# Patient Record
Sex: FEMALE | Race: OTHER | HISPANIC OR LATINO | URBAN - METROPOLITAN AREA
[De-identification: names, ages, dates, MRNs, and addresses within clinical notes are randomized per-mention and may not be internally consistent; named-entity substitution may affect disease eponyms.]

---

## 2017-01-17 ENCOUNTER — INPATIENT (INPATIENT)
Facility: HOSPITAL | Age: 27
LOS: 0 days | Discharge: HOME | DRG: 777 | End: 2017-01-18
Attending: OBSTETRICS & GYNECOLOGY | Admitting: OBSTETRICS & GYNECOLOGY
Payer: MEDICAID

## 2017-01-17 VITALS
HEART RATE: 85 BPM | RESPIRATION RATE: 18 BRPM | DIASTOLIC BLOOD PRESSURE: 99 MMHG | SYSTOLIC BLOOD PRESSURE: 141 MMHG | OXYGEN SATURATION: 100 % | TEMPERATURE: 99 F

## 2017-01-17 DIAGNOSIS — R10.9 UNSPECIFIED ABDOMINAL PAIN: ICD-10-CM

## 2017-01-17 DIAGNOSIS — O00.90 UNSPECIFIED ECTOPIC PREGNANCY WITHOUT INTRAUTERINE PREGNANCY: ICD-10-CM

## 2017-01-17 LAB
ALBUMIN SERPL ELPH-MCNC: 4.1 G/DL — SIGNIFICANT CHANGE UP (ref 3.3–5)
ALP SERPL-CCNC: 45 U/L — SIGNIFICANT CHANGE UP (ref 40–120)
ALT FLD-CCNC: 11 U/L RC — SIGNIFICANT CHANGE UP (ref 10–45)
ANION GAP SERPL CALC-SCNC: 10 MMOL/L — SIGNIFICANT CHANGE UP (ref 5–17)
APPEARANCE UR: CLEAR — SIGNIFICANT CHANGE UP
AST SERPL-CCNC: 18 U/L — SIGNIFICANT CHANGE UP (ref 10–40)
BASOPHILS # BLD AUTO: 0 K/UL — SIGNIFICANT CHANGE UP (ref 0–0.2)
BASOPHILS NFR BLD AUTO: 0.4 % — SIGNIFICANT CHANGE UP (ref 0–2)
BILIRUB SERPL-MCNC: 0.3 MG/DL — SIGNIFICANT CHANGE UP (ref 0.2–1.2)
BILIRUB UR-MCNC: NEGATIVE — SIGNIFICANT CHANGE UP
BLD GP AB SCN SERPL QL: NEGATIVE — SIGNIFICANT CHANGE UP
BUN SERPL-MCNC: 10 MG/DL — SIGNIFICANT CHANGE UP (ref 7–23)
CALCIUM SERPL-MCNC: 8.3 MG/DL — LOW (ref 8.4–10.5)
CHLORIDE SERPL-SCNC: 107 MMOL/L — SIGNIFICANT CHANGE UP (ref 96–108)
CO2 SERPL-SCNC: 23 MMOL/L — SIGNIFICANT CHANGE UP (ref 22–31)
COLOR SPEC: YELLOW — SIGNIFICANT CHANGE UP
CREAT SERPL-MCNC: 0.67 MG/DL — SIGNIFICANT CHANGE UP (ref 0.5–1.3)
DIFF PNL FLD: NEGATIVE — SIGNIFICANT CHANGE UP
EOSINOPHIL # BLD AUTO: 0 K/UL — SIGNIFICANT CHANGE UP (ref 0–0.5)
EOSINOPHIL NFR BLD AUTO: 0.8 % — SIGNIFICANT CHANGE UP (ref 0–6)
EPI CELLS # UR: SIGNIFICANT CHANGE UP /HPF
GLUCOSE SERPL-MCNC: 121 MG/DL — HIGH (ref 70–99)
GLUCOSE UR QL: NEGATIVE — SIGNIFICANT CHANGE UP
HCG SERPL-ACNC: 565.5 MIU/ML — SIGNIFICANT CHANGE UP
HCT VFR BLD CALC: 33.9 % — LOW (ref 34.5–45)
HGB BLD-MCNC: 11.8 G/DL — SIGNIFICANT CHANGE UP (ref 11.5–15.5)
KETONES UR-MCNC: NEGATIVE — SIGNIFICANT CHANGE UP
LEUKOCYTE ESTERASE UR-ACNC: NEGATIVE — SIGNIFICANT CHANGE UP
LYMPHOCYTES # BLD AUTO: 2 K/UL — SIGNIFICANT CHANGE UP (ref 1–3.3)
LYMPHOCYTES # BLD AUTO: 34.9 % — SIGNIFICANT CHANGE UP (ref 13–44)
MCHC RBC-ENTMCNC: 32.9 PG — SIGNIFICANT CHANGE UP (ref 27–34)
MCHC RBC-ENTMCNC: 34.7 GM/DL — SIGNIFICANT CHANGE UP (ref 32–36)
MCV RBC AUTO: 94.6 FL — SIGNIFICANT CHANGE UP (ref 80–100)
MONOCYTES # BLD AUTO: 0.3 K/UL — SIGNIFICANT CHANGE UP (ref 0–0.9)
MONOCYTES NFR BLD AUTO: 4.8 % — SIGNIFICANT CHANGE UP (ref 2–14)
NEUTROPHILS # BLD AUTO: 3.5 K/UL — SIGNIFICANT CHANGE UP (ref 1.8–7.4)
NEUTROPHILS NFR BLD AUTO: 59.1 % — SIGNIFICANT CHANGE UP (ref 43–77)
NITRITE UR-MCNC: NEGATIVE — SIGNIFICANT CHANGE UP
PH UR: 7 — SIGNIFICANT CHANGE UP (ref 4.8–8)
PLATELET # BLD AUTO: 175 K/UL — SIGNIFICANT CHANGE UP (ref 150–400)
POTASSIUM SERPL-MCNC: 3.5 MMOL/L — SIGNIFICANT CHANGE UP (ref 3.5–5.3)
POTASSIUM SERPL-SCNC: 3.5 MMOL/L — SIGNIFICANT CHANGE UP (ref 3.5–5.3)
PROT SERPL-MCNC: 7.1 G/DL — SIGNIFICANT CHANGE UP (ref 6–8.3)
PROT UR-MCNC: SIGNIFICANT CHANGE UP
RBC # BLD: 3.59 M/UL — LOW (ref 3.8–5.2)
RBC # FLD: 12.7 % — SIGNIFICANT CHANGE UP (ref 10.3–14.5)
RBC CASTS # UR COMP ASSIST: SIGNIFICANT CHANGE UP /HPF (ref 0–2)
RH IG SCN BLD-IMP: POSITIVE — SIGNIFICANT CHANGE UP
SODIUM SERPL-SCNC: 140 MMOL/L — SIGNIFICANT CHANGE UP (ref 135–145)
SP GR SPEC: 1.02 — SIGNIFICANT CHANGE UP (ref 1.01–1.02)
UROBILINOGEN FLD QL: NEGATIVE — SIGNIFICANT CHANGE UP
WBC # BLD: 5.9 K/UL — SIGNIFICANT CHANGE UP (ref 3.8–10.5)
WBC # FLD AUTO: 5.9 K/UL — SIGNIFICANT CHANGE UP (ref 3.8–10.5)
WBC UR QL: SIGNIFICANT CHANGE UP /HPF (ref 0–5)

## 2017-01-17 PROCEDURE — 93975 VASCULAR STUDY: CPT | Mod: 26

## 2017-01-17 PROCEDURE — 59151 TREAT ECTOPIC PREGNANCY: CPT

## 2017-01-17 PROCEDURE — 99285 EMERGENCY DEPT VISIT HI MDM: CPT

## 2017-01-17 PROCEDURE — 76817 TRANSVAGINAL US OBSTETRIC: CPT | Mod: 26

## 2017-01-17 RX ORDER — SODIUM CHLORIDE 9 MG/ML
1000 INJECTION, SOLUTION INTRAVENOUS
Qty: 0 | Refills: 0 | Status: DISCONTINUED | OUTPATIENT
Start: 2017-01-17 | End: 2017-01-18

## 2017-01-17 NOTE — ED PROVIDER NOTE - PROGRESS NOTE DETAILS
Dr. Bedoya: Pt with L adnexal ectopic. Continues to be HD stable. OBGYN consulted. Dr. Bedoya: Pt to go to OR with OBGYN in setting of worsening pain.

## 2017-01-17 NOTE — ED PROVIDER NOTE - ATTENDING CONTRIBUTION TO CARE
28yo F  EGA 8 weeks by dates. LMP was 16. Pt states she has been having pain suprapubic and RLQ x1 week radiating to low back. Denies vaginal bleeding, fever, chills, dysuria. Has not seen anyone for this pregnancy as she just took home test yesterday. Well appearing young female NAD, HD stable. Lungs clear, RRR, no murmur. ABd soft suprapubic and RLQ TTP, +guarding, no rebound. ext wwp, no edema. Plan: Labs, UA, UCG, Beta, Tyoe and screen, sono, re-eval.

## 2017-01-17 NOTE — ED PROVIDER NOTE - MEDICAL DECISION MAKING DETAILS
27 year old female with RLQ/suprapubic pain and right adnexal tenderness on exam, 7-8 weeks pregnant by dates, will r/o UTI, r/o ectopic and check type, reassess 27 year old female with RLQ/suprapubic pain and right adnexal tenderness on exam, 7-8 weeks pregnant by dates, will r/o UTI, r/o ectopic and check type, reassess  Turrin: See attending statement below

## 2017-01-17 NOTE — ED PROVIDER NOTE - OBJECTIVE STATEMENT
27 year old female with no PMHx  (first preg c/b bleeding during first month) p/w LLQ pain x 1 week, no dysuria, no fever,   LMP 16 27 year old female with no PMHx  (first preg c/b bleeding during first month) p/w suprapubic/RLQ pain radiating to lower back x 1 week, no dysuria, no fever, no discharge, no bleeding. She took 2 home pregnancy tests yesterday and they were both positive (Upreg here also positive). No fertility drugs. She has been taking Ampicillin for her pain  because she did not know that she was pregnant. No STD risk factors.  LMP 16

## 2017-01-17 NOTE — H&P ADULT. - HISTORY OF PRESENT ILLNESS
39 year old  LMP  presenting with three days of worsening pelvic pain. Pain started as 2/10 is now 8/10. No nausea, emesis, vaginal bleeding, CP/SOB, fevers. Took home pregnancy test, positive. Pain is bilateral lower quadrant. Worse with ambulation. Worse with movement. No dizziness. No history of STD/PID.

## 2017-01-17 NOTE — H&P ADULT. - PROBLEM SELECTOR PLAN 1
-Admit to GYN Service  -Patient counseled and consented for Dx Laparoscopy, removal of ectopic pregnancy, salpingectomy, possible ovarian cystectomy, possible laparotomy  -Patient counseled on risks/benefits/alternatives, explained that pregnancy is non viable and life threatening, patient demonstrates understanding  - Labs drawn  -NPO  -[id number]  -Routine postoperative care

## 2017-01-17 NOTE — ED ADULT NURSE NOTE - OBJECTIVE STATEMENT
27 y.o. Female presents to the ED c/o suprapubic and RLQ pain that radiates to the lower back x1 week. Pt took 2 pregnancy tests at home with positive results x2. Pt reports taking ampicillin for pain without knowing she was pregnant. LMP was 2016.  - reports bleeding for the first month of the first pregnancy. Denies any bleeding, fever/chills, CP, SOB, N/V/D, urinary/bowel complications. Pt is in no current distress. Comfort and safety provided. 27 y.o. Female presents to the ED c/o suprapubic and RLQ pain "on and off" that radiates to the lower back x1 week. Ghanaian speaking - translated by Dr. Wilson. Pt took 2 pregnancy tests at home with positive results x2. Pt reports taking ampicillin for pain without knowing she was pregnant. LMP was 2016.  - reports bleeding for the first month of the first pregnancy. Denies any bleeding, fever/chills, CP, SOB, N/V/D, urinary/bowel complications. Pt is in no current distress. Comfort and safety provided. Significant other at bedside. 27 y.o. Female presents to the ED c/o suprapubic and RLQ pain "on and off" that radiates to the lower back x1 week. Israeli speaking - translated by Dr. Wilson. Pt took 2 pregnancy tests at home with positive results x2. Pt reports taking ampicillin for pain without knowing she was pregnant. LMP was 2016.  - reports bleeding for the first month of the first pregnancy. Tenderness in suprapubic area. Denies any bleeding, fever/chills, CP, SOB, N/V/D, urinary/bowel complications. Pt is in no current distress. Comfort and safety provided. Significant other at bedside.

## 2017-01-18 VITALS
SYSTOLIC BLOOD PRESSURE: 96 MMHG | RESPIRATION RATE: 19 BRPM | DIASTOLIC BLOOD PRESSURE: 60 MMHG | HEART RATE: 76 BPM | OXYGEN SATURATION: 100 % | TEMPERATURE: 98 F

## 2017-01-18 LAB
BASOPHILS # BLD AUTO: 0 K/UL — SIGNIFICANT CHANGE UP (ref 0–0.2)
BASOPHILS NFR BLD AUTO: 0.1 % — SIGNIFICANT CHANGE UP (ref 0–2)
CULTURE RESULTS: SIGNIFICANT CHANGE UP
EOSINOPHIL # BLD AUTO: 0 K/UL — SIGNIFICANT CHANGE UP (ref 0–0.5)
EOSINOPHIL NFR BLD AUTO: 0 % — SIGNIFICANT CHANGE UP (ref 0–6)
HCT VFR BLD CALC: 33.5 % — LOW (ref 34.5–45)
HGB BLD-MCNC: 11.6 G/DL — SIGNIFICANT CHANGE UP (ref 11.5–15.5)
LYMPHOCYTES # BLD AUTO: 1.1 K/UL — SIGNIFICANT CHANGE UP (ref 1–3.3)
LYMPHOCYTES # BLD AUTO: 9.7 % — LOW (ref 13–44)
MCHC RBC-ENTMCNC: 33.2 PG — SIGNIFICANT CHANGE UP (ref 27–34)
MCHC RBC-ENTMCNC: 34.8 GM/DL — SIGNIFICANT CHANGE UP (ref 32–36)
MCV RBC AUTO: 95.3 FL — SIGNIFICANT CHANGE UP (ref 80–100)
MONOCYTES # BLD AUTO: 0.3 K/UL — SIGNIFICANT CHANGE UP (ref 0–0.9)
MONOCYTES NFR BLD AUTO: 2.5 % — SIGNIFICANT CHANGE UP (ref 2–14)
NEUTROPHILS # BLD AUTO: 10.2 K/UL — HIGH (ref 1.8–7.4)
NEUTROPHILS NFR BLD AUTO: 87.7 % — HIGH (ref 43–77)
PLATELET # BLD AUTO: 169 K/UL — SIGNIFICANT CHANGE UP (ref 150–400)
RBC # BLD: 3.51 M/UL — LOW (ref 3.8–5.2)
RBC # FLD: 12.8 % — SIGNIFICANT CHANGE UP (ref 10.3–14.5)
SPECIMEN SOURCE: SIGNIFICANT CHANGE UP
WBC # BLD: 11.6 K/UL — HIGH (ref 3.8–10.5)
WBC # FLD AUTO: 11.6 K/UL — HIGH (ref 3.8–10.5)

## 2017-01-18 PROCEDURE — 88304 TISSUE EXAM BY PATHOLOGIST: CPT | Mod: 26

## 2017-01-18 PROCEDURE — 88305 TISSUE EXAM BY PATHOLOGIST: CPT | Mod: 26

## 2017-01-18 RX ORDER — ACETAMINOPHEN 500 MG
975 TABLET ORAL EVERY 6 HOURS
Qty: 0 | Refills: 0 | Status: DISCONTINUED | OUTPATIENT
Start: 2017-01-18 | End: 2017-01-18

## 2017-01-18 RX ORDER — OXYCODONE HYDROCHLORIDE 5 MG/1
5 TABLET ORAL EVERY 4 HOURS
Qty: 0 | Refills: 0 | Status: DISCONTINUED | OUTPATIENT
Start: 2017-01-18 | End: 2017-01-18

## 2017-01-18 RX ORDER — HYDROMORPHONE HYDROCHLORIDE 2 MG/ML
0.5 INJECTION INTRAMUSCULAR; INTRAVENOUS; SUBCUTANEOUS
Qty: 0 | Refills: 0 | Status: DISCONTINUED | OUTPATIENT
Start: 2017-01-18 | End: 2017-01-18

## 2017-01-18 RX ORDER — OXYCODONE HYDROCHLORIDE 5 MG/1
1 TABLET ORAL
Qty: 20 | Refills: 0 | OUTPATIENT
Start: 2017-01-18 | End: 2017-01-23

## 2017-01-18 RX ORDER — ONDANSETRON 8 MG/1
4 TABLET, FILM COATED ORAL ONCE
Qty: 0 | Refills: 0 | Status: DISCONTINUED | OUTPATIENT
Start: 2017-01-18 | End: 2017-01-18

## 2017-01-18 RX ORDER — SODIUM CHLORIDE 9 MG/ML
1000 INJECTION, SOLUTION INTRAVENOUS
Qty: 0 | Refills: 0 | Status: COMPLETED | OUTPATIENT
Start: 2017-01-18 | End: 2017-01-18

## 2017-01-18 RX ORDER — SODIUM CHLORIDE 9 MG/ML
1000 INJECTION, SOLUTION INTRAVENOUS ONCE
Qty: 0 | Refills: 0 | Status: COMPLETED | OUTPATIENT
Start: 2017-01-18 | End: 2017-01-18

## 2017-01-18 RX ORDER — SIMETHICONE 80 MG/1
80 TABLET, CHEWABLE ORAL
Qty: 0 | Refills: 0 | Status: DISCONTINUED | OUTPATIENT
Start: 2017-01-18 | End: 2017-01-18

## 2017-01-18 RX ORDER — SODIUM CHLORIDE 9 MG/ML
1000 INJECTION, SOLUTION INTRAVENOUS
Qty: 0 | Refills: 0 | Status: DISCONTINUED | OUTPATIENT
Start: 2017-01-18 | End: 2017-01-18

## 2017-01-18 RX ORDER — IBUPROFEN 200 MG
600 TABLET ORAL EVERY 6 HOURS
Qty: 0 | Refills: 0 | Status: DISCONTINUED | OUTPATIENT
Start: 2017-01-18 | End: 2017-01-18

## 2017-01-18 RX ORDER — IBUPROFEN 200 MG
1 TABLET ORAL
Qty: 0 | Refills: 0 | COMMUNITY
Start: 2017-01-18

## 2017-01-18 RX ADMIN — Medication 975 MILLIGRAM(S): at 05:27

## 2017-01-18 RX ADMIN — OXYCODONE HYDROCHLORIDE 5 MILLIGRAM(S): 5 TABLET ORAL at 21:26

## 2017-01-18 RX ADMIN — Medication 975 MILLIGRAM(S): at 12:45

## 2017-01-18 RX ADMIN — OXYCODONE HYDROCHLORIDE 5 MILLIGRAM(S): 5 TABLET ORAL at 16:40

## 2017-01-18 RX ADMIN — SIMETHICONE 80 MILLIGRAM(S): 80 TABLET, CHEWABLE ORAL at 11:55

## 2017-01-18 RX ADMIN — SODIUM CHLORIDE 1000 MILLILITER(S): 9 INJECTION, SOLUTION INTRAVENOUS at 10:12

## 2017-01-18 RX ADMIN — Medication 975 MILLIGRAM(S): at 19:00

## 2017-01-18 RX ADMIN — SODIUM CHLORIDE 999 MILLILITER(S): 9 INJECTION, SOLUTION INTRAVENOUS at 06:54

## 2017-01-18 RX ADMIN — Medication 975 MILLIGRAM(S): at 18:27

## 2017-01-18 RX ADMIN — OXYCODONE HYDROCHLORIDE 5 MILLIGRAM(S): 5 TABLET ORAL at 09:00

## 2017-01-18 RX ADMIN — Medication 600 MILLIGRAM(S): at 11:54

## 2017-01-18 RX ADMIN — OXYCODONE HYDROCHLORIDE 5 MILLIGRAM(S): 5 TABLET ORAL at 21:56

## 2017-01-18 RX ADMIN — Medication 600 MILLIGRAM(S): at 12:45

## 2017-01-18 RX ADMIN — Medication 600 MILLIGRAM(S): at 17:07

## 2017-01-18 RX ADMIN — HYDROMORPHONE HYDROCHLORIDE 0.5 MILLIGRAM(S): 2 INJECTION INTRAMUSCULAR; INTRAVENOUS; SUBCUTANEOUS at 03:40

## 2017-01-18 RX ADMIN — Medication 600 MILLIGRAM(S): at 05:27

## 2017-01-18 RX ADMIN — OXYCODONE HYDROCHLORIDE 5 MILLIGRAM(S): 5 TABLET ORAL at 15:26

## 2017-01-18 RX ADMIN — OXYCODONE HYDROCHLORIDE 5 MILLIGRAM(S): 5 TABLET ORAL at 08:08

## 2017-01-18 RX ADMIN — SIMETHICONE 80 MILLIGRAM(S): 80 TABLET, CHEWABLE ORAL at 18:27

## 2017-01-18 RX ADMIN — Medication 600 MILLIGRAM(S): at 18:26

## 2017-01-18 RX ADMIN — Medication 975 MILLIGRAM(S): at 11:55

## 2017-01-18 NOTE — BRIEF OPERATIVE NOTE - OPERATION/FINDINGS
100 cc hemoperitoneum. Left tubal ectopic pregnancy. Left paratubal cyst ~ 5.0 cm, Right paratubal cysts

## 2017-01-18 NOTE — DISCHARGE NOTE ADULT - HOSPITAL COURSE
Patient is a 26 year old  LMP  presenting with progressively worsening abdominal pain for three days. Patient had TVUS, found to have left ectopic pregnancy. Decision was made to go to the OR. Patient underwent LSC left salpingectomy, removal of ectopic pregnancy, bilateral paratubal cystectomy. Patient tolerated the procedure well. Vitals stable, patient discharged on postoperative day zero with outpatient follow up in [facility].

## 2017-01-18 NOTE — PROVIDER CONTACT NOTE (OTHER) - ACTION/TREATMENT ORDERED:
Dr. Dunlap ordered 500mL bolus of LR to be given now and 125 mL/hour of LR post bolus.  Will reassess VS post bolus. Dr. Dunlap ordered 1000 mL bolus of LR to be given now and 125 mL/hour of LR post bolus. Bolus of LR initiated.  Will reassess VS post bolus.

## 2017-01-18 NOTE — DISCHARGE NOTE ADULT - CARE PLAN
Principal Discharge DX:	Ectopic pregnancy  Goal:	Return to normal activity  Instructions for follow-up, activity and diet:	Regular diet as tolerated. Ambulate as tolerated.

## 2017-01-18 NOTE — DISCHARGE NOTE ADULT - PROVIDER TOKENS
FREE:[LAST:[[facility]],PHONE:[phone]],FAX:[(   [phone]    -],ADDRESS:[[address] [place] [address]]]

## 2017-01-18 NOTE — DISCHARGE NOTE ADULT - PATIENT PORTAL LINK FT
“You can access the [facility] Patient Portal, offered by [facility], by registering with the following website: [email or web address]”

## 2017-01-18 NOTE — DISCHARGE NOTE ADULT - MEDICATION SUMMARY - MEDICATIONS TO TAKE
I will START or STAY ON the medications listed below when I get home from the hospital:    oxyCODONE 5 mg oral tablet  -- 1 tab(s) by mouth 4 times a day, As Needed MDD:4  -- Caution federal law prohibits the transfer of this drug to any person other  than the person for whom it was prescribed.  It is very important that you take or use this exactly as directed.  Do not skip doses or discontinue unless directed by your doctor.  May cause drowsiness.  Alcohol may intensify this effect.  Use care when operating dangerous machinery.  This prescription cannot be refilled.  Using more of this medication than prescribed may cause serious breathing problems.    -- Indication: For Pain    ibuprofen 600 mg oral tablet  -- 1 tab(s) by mouth every 6 hours  -- Indication: For Pain

## 2017-01-18 NOTE — PROVIDER CONTACT NOTE (OTHER) - RECOMMENDATIONS
Spoke with Dr. Dunlap about starting IVF. Primary RN aware and spoke with Dr. Dunlap about starting IVF.

## 2017-01-18 NOTE — BRIEF OPERATIVE NOTE - PROCEDURE
Laparoscopic cystectomy  01/18/2017    Active  [id number]  Laparoscopic salpingectomy  01/18/2017    Active  [id number]

## 2017-01-18 NOTE — ED ADULT NURSE REASSESSMENT NOTE - NS ED NURSE REASSESS COMMENT FT1
2300 Report received from Josefina AN. Pt AAOx4, NAD, resting comfortably in bed with spouse at bedside. Pt c/o 8/10 RLQ pain. Pt denies headache, dizziness, chest pain, cough, SOB, n/v/d, vaginal bleeding/discharge, urinary symptoms, fevers, chills, weakness at this time. Safety maintained.    7585: Pt AAOx4, NAD, no changes observed at this time. OR NATALIYA Sethi called ED RN for report. Report given to OR room 7 NATALIYA Sethi. Safety maintained, pt awaiting transport. Pt undressed and jewelry removed, spouse has pt's belongings.

## 2017-01-18 NOTE — DISCHARGE NOTE ADULT - INSTRUCTIONS
regular diet Keep follow up appointment with doctor as instructed and call doctor if you have any signs of infection, fever, chills, difficulty urinating, scope sites draining, swollen, reddened warm to touch, heavy vaginal bleeding or clots, excessive pain, unable to tolerate food, and any questions or concerns.

## 2017-01-20 ENCOUNTER — EMERGENCY (EMERGENCY)
Facility: HOSPITAL | Age: 27
LOS: 1 days | Discharge: HOME | End: 2017-01-20
Attending: EMERGENCY MEDICINE | Admitting: EMERGENCY MEDICINE
Payer: MEDICAID

## 2017-01-20 VITALS
SYSTOLIC BLOOD PRESSURE: 107 MMHG | DIASTOLIC BLOOD PRESSURE: 73 MMHG | OXYGEN SATURATION: 100 % | TEMPERATURE: 98 F | HEART RATE: 67 BPM | RESPIRATION RATE: 18 BRPM

## 2017-01-20 VITALS
HEIGHT: 63.78 IN | HEART RATE: 96 BPM | SYSTOLIC BLOOD PRESSURE: 102 MMHG | OXYGEN SATURATION: 99 % | TEMPERATURE: 98 F | DIASTOLIC BLOOD PRESSURE: 60 MMHG | RESPIRATION RATE: 18 BRPM

## 2017-01-20 DIAGNOSIS — R10.31 RIGHT LOWER QUADRANT PAIN: ICD-10-CM

## 2017-01-20 DIAGNOSIS — R10.32 LEFT LOWER QUADRANT PAIN: ICD-10-CM

## 2017-01-20 DIAGNOSIS — O00.90 UNSPECIFIED ECTOPIC PREGNANCY WITHOUT INTRAUTERINE PREGNANCY: Chronic | ICD-10-CM

## 2017-01-20 DIAGNOSIS — R10.30 LOWER ABDOMINAL PAIN, UNSPECIFIED: ICD-10-CM

## 2017-01-20 LAB
ALBUMIN SERPL ELPH-MCNC: 3.9 G/DL — SIGNIFICANT CHANGE UP (ref 3.3–5)
ALP SERPL-CCNC: 44 U/L — SIGNIFICANT CHANGE UP (ref 40–120)
ALT FLD-CCNC: 16 U/L RC — SIGNIFICANT CHANGE UP (ref 10–45)
ANION GAP SERPL CALC-SCNC: 11 MMOL/L — SIGNIFICANT CHANGE UP (ref 5–17)
APPEARANCE UR: ABNORMAL
APTT BLD: 31.5 SEC — SIGNIFICANT CHANGE UP (ref 27.5–37.4)
AST SERPL-CCNC: 26 U/L — SIGNIFICANT CHANGE UP (ref 10–40)
BACTERIA # UR AUTO: ABNORMAL /HPF
BASOPHILS # BLD AUTO: 0 K/UL — SIGNIFICANT CHANGE UP (ref 0–0.2)
BASOPHILS NFR BLD AUTO: 0.5 % — SIGNIFICANT CHANGE UP (ref 0–2)
BILIRUB SERPL-MCNC: 0.2 MG/DL — SIGNIFICANT CHANGE UP (ref 0.2–1.2)
BILIRUB UR-MCNC: NEGATIVE — SIGNIFICANT CHANGE UP
BUN SERPL-MCNC: 9 MG/DL — SIGNIFICANT CHANGE UP (ref 7–23)
CALCIUM SERPL-MCNC: 8 MG/DL — LOW (ref 8.4–10.5)
CHLORIDE SERPL-SCNC: 105 MMOL/L — SIGNIFICANT CHANGE UP (ref 96–108)
CO2 SERPL-SCNC: 24 MMOL/L — SIGNIFICANT CHANGE UP (ref 22–31)
COLOR SPEC: SIGNIFICANT CHANGE UP
CREAT SERPL-MCNC: 0.51 MG/DL — SIGNIFICANT CHANGE UP (ref 0.5–1.3)
DIFF PNL FLD: NEGATIVE — SIGNIFICANT CHANGE UP
EOSINOPHIL # BLD AUTO: 0.1 K/UL — SIGNIFICANT CHANGE UP (ref 0–0.5)
EOSINOPHIL NFR BLD AUTO: 1.1 % — SIGNIFICANT CHANGE UP (ref 0–6)
EPI CELLS # UR: ABNORMAL /HPF
GAS PNL BLDV: SIGNIFICANT CHANGE UP
GLUCOSE SERPL-MCNC: 86 MG/DL — SIGNIFICANT CHANGE UP (ref 70–99)
GLUCOSE UR QL: NEGATIVE — SIGNIFICANT CHANGE UP
HCG SERPL-ACNC: 96.9 MIU/ML — SIGNIFICANT CHANGE UP
HCT VFR BLD CALC: 34.5 % — SIGNIFICANT CHANGE UP (ref 34.5–45)
HGB BLD-MCNC: 11.6 G/DL — SIGNIFICANT CHANGE UP (ref 11.5–15.5)
INR BLD: 0.99 RATIO — SIGNIFICANT CHANGE UP (ref 0.88–1.16)
KETONES UR-MCNC: NEGATIVE — SIGNIFICANT CHANGE UP
LEUKOCYTE ESTERASE UR-ACNC: NEGATIVE — SIGNIFICANT CHANGE UP
LYMPHOCYTES # BLD AUTO: 2.1 K/UL — SIGNIFICANT CHANGE UP (ref 1–3.3)
LYMPHOCYTES # BLD AUTO: 38 % — SIGNIFICANT CHANGE UP (ref 13–44)
MCHC RBC-ENTMCNC: 32 PG — SIGNIFICANT CHANGE UP (ref 27–34)
MCHC RBC-ENTMCNC: 33.7 GM/DL — SIGNIFICANT CHANGE UP (ref 32–36)
MCV RBC AUTO: 95.1 FL — SIGNIFICANT CHANGE UP (ref 80–100)
MONOCYTES # BLD AUTO: 0.3 K/UL — SIGNIFICANT CHANGE UP (ref 0–0.9)
MONOCYTES NFR BLD AUTO: 5 % — SIGNIFICANT CHANGE UP (ref 2–14)
NEUTROPHILS # BLD AUTO: 3.1 K/UL — SIGNIFICANT CHANGE UP (ref 1.8–7.4)
NEUTROPHILS NFR BLD AUTO: 55.5 % — SIGNIFICANT CHANGE UP (ref 43–77)
NITRITE UR-MCNC: NEGATIVE — SIGNIFICANT CHANGE UP
PH UR: 6.5 — SIGNIFICANT CHANGE UP (ref 4.8–8)
PLATELET # BLD AUTO: 174 K/UL — SIGNIFICANT CHANGE UP (ref 150–400)
POTASSIUM SERPL-MCNC: 4 MMOL/L — SIGNIFICANT CHANGE UP (ref 3.5–5.3)
POTASSIUM SERPL-SCNC: 4 MMOL/L — SIGNIFICANT CHANGE UP (ref 3.5–5.3)
PROT SERPL-MCNC: 6.4 G/DL — SIGNIFICANT CHANGE UP (ref 6–8.3)
PROT UR-MCNC: NEGATIVE — SIGNIFICANT CHANGE UP
PROTHROM AB SERPL-ACNC: 10.7 SEC — SIGNIFICANT CHANGE UP (ref 10–13.1)
RBC # BLD: 3.63 M/UL — LOW (ref 3.8–5.2)
RBC # FLD: 12.3 % — SIGNIFICANT CHANGE UP (ref 10.3–14.5)
RBC CASTS # UR COMP ASSIST: SIGNIFICANT CHANGE UP /HPF (ref 0–2)
SODIUM SERPL-SCNC: 140 MMOL/L — SIGNIFICANT CHANGE UP (ref 135–145)
SP GR SPEC: 1.01 — LOW (ref 1.01–1.02)
UROBILINOGEN FLD QL: NEGATIVE — SIGNIFICANT CHANGE UP
WBC # BLD: 5.5 K/UL — SIGNIFICANT CHANGE UP (ref 3.8–10.5)
WBC # FLD AUTO: 5.5 K/UL — SIGNIFICANT CHANGE UP (ref 3.8–10.5)
WBC UR QL: SIGNIFICANT CHANGE UP /HPF (ref 0–5)

## 2017-01-20 PROCEDURE — 99285 EMERGENCY DEPT VISIT HI MDM: CPT | Mod: 25

## 2017-01-20 PROCEDURE — 74177 CT ABD & PELVIS W/CONTRAST: CPT

## 2017-01-20 PROCEDURE — 82803 BLOOD GASES ANY COMBINATION: CPT

## 2017-01-20 PROCEDURE — 82330 ASSAY OF CALCIUM: CPT

## 2017-01-20 PROCEDURE — 82947 ASSAY GLUCOSE BLOOD QUANT: CPT

## 2017-01-20 PROCEDURE — 83605 ASSAY OF LACTIC ACID: CPT

## 2017-01-20 PROCEDURE — 96374 THER/PROPH/DIAG INJ IV PUSH: CPT | Mod: XU

## 2017-01-20 PROCEDURE — 96375 TX/PRO/DX INJ NEW DRUG ADDON: CPT | Mod: XU

## 2017-01-20 PROCEDURE — 85730 THROMBOPLASTIN TIME PARTIAL: CPT

## 2017-01-20 PROCEDURE — 82435 ASSAY OF BLOOD CHLORIDE: CPT

## 2017-01-20 PROCEDURE — 99053 MED SERV 10PM-8AM 24 HR FAC: CPT

## 2017-01-20 PROCEDURE — 85014 HEMATOCRIT: CPT

## 2017-01-20 PROCEDURE — 99284 EMERGENCY DEPT VISIT MOD MDM: CPT | Mod: 25

## 2017-01-20 PROCEDURE — 84702 CHORIONIC GONADOTROPIN TEST: CPT

## 2017-01-20 PROCEDURE — 84132 ASSAY OF SERUM POTASSIUM: CPT

## 2017-01-20 PROCEDURE — 81001 URINALYSIS AUTO W/SCOPE: CPT

## 2017-01-20 PROCEDURE — 74177 CT ABD & PELVIS W/CONTRAST: CPT | Mod: 26

## 2017-01-20 PROCEDURE — 80053 COMPREHEN METABOLIC PANEL: CPT

## 2017-01-20 PROCEDURE — 85610 PROTHROMBIN TIME: CPT

## 2017-01-20 PROCEDURE — 84295 ASSAY OF SERUM SODIUM: CPT

## 2017-01-20 PROCEDURE — 85027 COMPLETE CBC AUTOMATED: CPT

## 2017-01-20 RX ORDER — ONDANSETRON 8 MG/1
4 TABLET, FILM COATED ORAL ONCE
Qty: 0 | Refills: 0 | Status: COMPLETED | OUTPATIENT
Start: 2017-01-20 | End: 2017-01-20

## 2017-01-20 RX ORDER — SODIUM CHLORIDE 9 MG/ML
1000 INJECTION INTRAMUSCULAR; INTRAVENOUS; SUBCUTANEOUS ONCE
Qty: 0 | Refills: 0 | Status: COMPLETED | OUTPATIENT
Start: 2017-01-20 | End: 2017-01-20

## 2017-01-20 RX ORDER — MORPHINE SULFATE 50 MG/1
4 CAPSULE, EXTENDED RELEASE ORAL ONCE
Qty: 0 | Refills: 0 | Status: DISCONTINUED | OUTPATIENT
Start: 2017-01-20 | End: 2017-01-20

## 2017-01-20 RX ORDER — KETOROLAC TROMETHAMINE 30 MG/ML
30 SYRINGE (ML) INJECTION ONCE
Qty: 0 | Refills: 0 | Status: DISCONTINUED | OUTPATIENT
Start: 2017-01-20 | End: 2017-01-20

## 2017-01-20 RX ADMIN — SODIUM CHLORIDE 2000 MILLILITER(S): 9 INJECTION INTRAMUSCULAR; INTRAVENOUS; SUBCUTANEOUS at 06:57

## 2017-01-20 RX ADMIN — MORPHINE SULFATE 4 MILLIGRAM(S): 50 CAPSULE, EXTENDED RELEASE ORAL at 06:21

## 2017-01-20 RX ADMIN — Medication 30 MILLIGRAM(S): at 06:56

## 2017-01-20 RX ADMIN — ONDANSETRON 4 MILLIGRAM(S): 8 TABLET, FILM COATED ORAL at 06:21

## 2017-01-20 RX ADMIN — MORPHINE SULFATE 4 MILLIGRAM(S): 50 CAPSULE, EXTENDED RELEASE ORAL at 06:23

## 2017-01-20 NOTE — ED PROVIDER NOTE - ABDOMINAL TENDER
Exquisite tenderness in right lower quadrant and left lower quadrant - minimal guarding - no tenderness in right upper quadrant and left upper quadrant - abdomen soft, non distended - 3 small incisions in right lower quadrant , left lower quadrant , and suprapubic region from prior surgery w/ no evidence of infection Exquisite tenderness in right lower quadrant and left lower quadrant - minimal guarding - no tenderness in right upper quadrant and left upper quadrant - abdomen soft, non distended

## 2017-01-20 NOTE — ED ADULT NURSE NOTE - CHIEF COMPLAINT QUOTE
abdominal pain x 1 hour ago; surgical procedure on tuesday for ecoptic pregnancy; last bm 2 days ago; no bleeding;

## 2017-01-20 NOTE — ED PROVIDER NOTE - PROGRESS NOTE DETAILS
spoke with OB/GYN - state that patient had severe gas pain prior to the surgery and was instructed to take Oxycodone and Ibuprofen to alleviate the pain - do not want any imaging on the patient or labs - will come see patient OB saw patient - reiterated to patient that she needs to use the laxatives they prescribed her as well as the motrin and oxycodone every 6 hours - requested we give Toradol - if patient's pain under control, can d/c home I personally saw this patient and she has sig ttp to the llq of the abdomen which she states was not typical of her pain when she was dc'd. Will proceed with CT a/p to r/o intraabdominal pathology. Received signout Dr DUSTIN Bradford.   26y female sp resection of ectopic pw abd pain .Pt reports pain much improved Cleared by GYN, Ct neg, Repeat exam min ttp no rebound guarding. Stable for dc  Dex Kaplan MD, Facep

## 2017-01-20 NOTE — ED ADULT NURSE REASSESSMENT NOTE - NS ED NURSE REASSESS COMMENT FT1
Pt received from NATALIYA Pelaez in [place], alert and oriented times three, breathing spontaneous, unlabored without distress on room air, NAD, report mild pain, awaits dispo

## 2017-01-20 NOTE — ED ADULT NURSE NOTE - DISCHARGE TEACHING
Discharge instructions giving and explained. Pt verbalizes understanding.  Ambulating without difficulty. A&O x 3.

## 2017-01-20 NOTE — ED ADULT TRIAGE NOTE - CHIEF COMPLAINT QUOTE
abdominal pain x 1 hour ago; surgical procedure on tuesday for ecoptic pregnancy; last bm 2 days ago; no bleeding; abdominal pain x 1 hour ago; surgical procedure on tuesday for ecoptic pregnancy; last bm 2 days ago; no bleeding; steri strips to umbilical area clean and dry

## 2017-01-20 NOTE — ED PROVIDER NOTE - ATTENDING CONTRIBUTION TO CARE
27 y/o with recent surgery for R tubal ectopic pregnancy p/w right lower quadrant and left lower quadrant abdominal pain that started suddenly 1 hour ago - will rx pain, check labs, and consult OB/GYN

## 2017-01-20 NOTE — ED PROVIDER NOTE - MEDICAL DECISION MAKING DETAILS
Ruth Resident: 25 y/o with recent surgery for R tubal ectopic pregnancy p/w right lower quadrant and left lower quadrant abdominal pain that started suddenly 1 hour ago - will rx pain, check labs, and consult OB/GYN

## 2017-01-20 NOTE — ED ADULT NURSE NOTE - OBJECTIVE STATEMENT
Pt presents to ED awake and alert, c/o acute onset lower abdominal pain. Pt states she had surgery done for an ectopic pregnancy 2 days ago and one hour ago began having severe pain. Pt denies n/v/f. Pt has not had a regular BM since her surgery and is not passing gas. Abdominal tenderness to the RLQ and LLQ. Pt appears in severe discomfort. Respirations even and unlabored. Denies other PMH.

## 2017-01-20 NOTE — ED PROVIDER NOTE - OBJECTIVE STATEMENT
25 y/o female with PMH of R tubal ectopic pregnancy 2 days ago s/p laparoscopic surgery w/ removal of ectopic p/w abdominal pain. Pain started 1 hour ago, in the suprapubic area radiating around to the back. Reports associated lightheadedness and nausea. Denies fever, chills, CP, SOB, vomiting, diarrhea, vaginal bleeding, discharge, dysuria, hematuria. 25 y/o female with PMH of R tubal ectopic pregnancy 2 days ago s/p laparoscopic surgery w/ removal of ectopic p/w abdominal pain. Pain started 1 hour ago, in the suprapubic area radiating around to the back. Reports associated lightheadedness and nausea. Denies fever, chills, CP, SOB, vomiting, diarrhea, vaginal bleeding, discharge, dysuria, hematuria. Patient reports she took Oxycodone and Ibuprofen as instructed by OB/GYN with no relief of pain, so came to ED.

## 2017-01-21 ENCOUNTER — EMERGENCY (EMERGENCY)
Facility: HOSPITAL | Age: 27
LOS: 1 days | Discharge: HOME | End: 2017-01-21
Attending: EMERGENCY MEDICINE | Admitting: EMERGENCY MEDICINE
Payer: MEDICAID

## 2017-01-21 VITALS
OXYGEN SATURATION: 97 % | TEMPERATURE: 99 F | DIASTOLIC BLOOD PRESSURE: 56 MMHG | HEART RATE: 96 BPM | RESPIRATION RATE: 16 BRPM | SYSTOLIC BLOOD PRESSURE: 98 MMHG

## 2017-01-21 VITALS
RESPIRATION RATE: 18 BRPM | DIASTOLIC BLOOD PRESSURE: 74 MMHG | TEMPERATURE: 98 F | HEART RATE: 74 BPM | OXYGEN SATURATION: 98 % | SYSTOLIC BLOOD PRESSURE: 127 MMHG

## 2017-01-21 DIAGNOSIS — N93.9 ABNORMAL UTERINE AND VAGINAL BLEEDING, UNSPECIFIED: ICD-10-CM

## 2017-01-21 DIAGNOSIS — R10.9 UNSPECIFIED ABDOMINAL PAIN: ICD-10-CM

## 2017-01-21 DIAGNOSIS — O00.90 UNSPECIFIED ECTOPIC PREGNANCY WITHOUT INTRAUTERINE PREGNANCY: Chronic | ICD-10-CM

## 2017-01-21 LAB
ALBUMIN SERPL ELPH-MCNC: 3.8 G/DL — SIGNIFICANT CHANGE UP (ref 3.3–5)
ALP SERPL-CCNC: 46 U/L — SIGNIFICANT CHANGE UP (ref 40–120)
ALT FLD-CCNC: 22 U/L RC — SIGNIFICANT CHANGE UP (ref 10–45)
ANION GAP SERPL CALC-SCNC: 12 MMOL/L — SIGNIFICANT CHANGE UP (ref 5–17)
AST SERPL-CCNC: 27 U/L — SIGNIFICANT CHANGE UP (ref 10–40)
BILIRUB SERPL-MCNC: 0.1 MG/DL — LOW (ref 0.2–1.2)
BLD GP AB SCN SERPL QL: NEGATIVE — SIGNIFICANT CHANGE UP
BUN SERPL-MCNC: 8 MG/DL — SIGNIFICANT CHANGE UP (ref 7–23)
CALCIUM SERPL-MCNC: 8.4 MG/DL — SIGNIFICANT CHANGE UP (ref 8.4–10.5)
CHLORIDE SERPL-SCNC: 105 MMOL/L — SIGNIFICANT CHANGE UP (ref 96–108)
CO2 SERPL-SCNC: 24 MMOL/L — SIGNIFICANT CHANGE UP (ref 22–31)
CREAT SERPL-MCNC: 0.55 MG/DL — SIGNIFICANT CHANGE UP (ref 0.5–1.3)
GAS PNL BLDV: SIGNIFICANT CHANGE UP
GLUCOSE SERPL-MCNC: 91 MG/DL — SIGNIFICANT CHANGE UP (ref 70–99)
HCT VFR BLD CALC: 32.5 % — LOW (ref 34.5–45)
HGB BLD-MCNC: 11.2 G/DL — LOW (ref 11.5–15.5)
MCHC RBC-ENTMCNC: 33 PG — SIGNIFICANT CHANGE UP (ref 27–34)
MCHC RBC-ENTMCNC: 34.4 GM/DL — SIGNIFICANT CHANGE UP (ref 32–36)
MCV RBC AUTO: 96 FL — SIGNIFICANT CHANGE UP (ref 80–100)
PLATELET # BLD AUTO: 166 K/UL — SIGNIFICANT CHANGE UP (ref 150–400)
POTASSIUM SERPL-MCNC: 3.9 MMOL/L — SIGNIFICANT CHANGE UP (ref 3.5–5.3)
POTASSIUM SERPL-SCNC: 3.9 MMOL/L — SIGNIFICANT CHANGE UP (ref 3.5–5.3)
PROT SERPL-MCNC: 6.4 G/DL — SIGNIFICANT CHANGE UP (ref 6–8.3)
RBC # BLD: 3.39 M/UL — LOW (ref 3.8–5.2)
RBC # FLD: 12.6 % — SIGNIFICANT CHANGE UP (ref 10.3–14.5)
RH IG SCN BLD-IMP: POSITIVE — SIGNIFICANT CHANGE UP
SODIUM SERPL-SCNC: 141 MMOL/L — SIGNIFICANT CHANGE UP (ref 135–145)
WBC # BLD: 5.6 K/UL — SIGNIFICANT CHANGE UP (ref 3.8–10.5)
WBC # FLD AUTO: 5.6 K/UL — SIGNIFICANT CHANGE UP (ref 3.8–10.5)

## 2017-01-21 PROCEDURE — 85027 COMPLETE CBC AUTOMATED: CPT

## 2017-01-21 PROCEDURE — 99284 EMERGENCY DEPT VISIT MOD MDM: CPT

## 2017-01-21 PROCEDURE — 85014 HEMATOCRIT: CPT

## 2017-01-21 PROCEDURE — 82947 ASSAY GLUCOSE BLOOD QUANT: CPT

## 2017-01-21 PROCEDURE — 84295 ASSAY OF SERUM SODIUM: CPT

## 2017-01-21 PROCEDURE — 86901 BLOOD TYPING SEROLOGIC RH(D): CPT

## 2017-01-21 PROCEDURE — 80053 COMPREHEN METABOLIC PANEL: CPT

## 2017-01-21 PROCEDURE — 82803 BLOOD GASES ANY COMBINATION: CPT

## 2017-01-21 PROCEDURE — 83605 ASSAY OF LACTIC ACID: CPT

## 2017-01-21 PROCEDURE — 82330 ASSAY OF CALCIUM: CPT

## 2017-01-21 PROCEDURE — 82435 ASSAY OF BLOOD CHLORIDE: CPT

## 2017-01-21 PROCEDURE — 86850 RBC ANTIBODY SCREEN: CPT

## 2017-01-21 PROCEDURE — 84132 ASSAY OF SERUM POTASSIUM: CPT

## 2017-01-21 PROCEDURE — 86900 BLOOD TYPING SEROLOGIC ABO: CPT

## 2017-01-21 NOTE — ED ADULT NURSE NOTE - OBJECTIVE STATEMENT
Pt is an ambulatory 26 yr old female a/oX 3 c/o vaginal bleeding, heavy that saturated 9 pads today.  Bleeding started today.  PT had procedure for ectopic pregnancy 2 days ago.  PERRL wnl, roberts with equal strength.  LUNGS CTA no chest pain or sob.  No fevers, chills.  C/o weakness and dizziness.  Abdomen is tender in b/l LQ/  SKIN WDI.  Peripheral pulses +2bl

## 2017-01-22 NOTE — ED PROVIDER NOTE - MEDICAL DECISION MAKING DETAILS
26 year old F h/o ectopic pregnancy s/p laparoscopic resection 4 days prior presenting with lower abdominal pain, moderate, non radiating with associated vaginal bleeding. Bleeding started as clots then red/brownish blood. patient changed pads twice in 4 hours. patient was in ED for same complaint day prior. no fever or chills.   Pt HD stable. well appearing. plan to check blood work and OB/gyn consult. reassess.

## 2017-01-22 NOTE — ED PROVIDER NOTE - PHYSICAL EXAMINATION
Well Appearing, Nontoxic, NAD;  Symm Facies, PERRL 3mm, (-)Pallor, Anicteric, MMM;  No JVD/Bruits or stridor;  RRR w/o m/g/r;   CTAB w/o w/r/r;   Abd soft, nt/nd, +bs;  No CVAT;  No edema/calf tender;  No rash;  AOX3, Normal speech, normal strength/sensation/gait   ED Tech Tosin chaperone.   Pelvic exam with no active bleeding. No CMT or adnexal pain. Brownish blood in vault.

## 2017-01-23 LAB — SURGICAL PATHOLOGY STUDY: SIGNIFICANT CHANGE UP

## 2017-01-30 ENCOUNTER — RESULT REVIEW (OUTPATIENT)
Age: 27
End: 2017-01-30

## 2017-01-30 ENCOUNTER — LABORATORY RESULT (OUTPATIENT)
Age: 27
End: 2017-01-30

## 2017-01-31 ENCOUNTER — APPOINTMENT (OUTPATIENT)
Dept: OBGYN | Facility: CLINIC | Age: 27
End: 2017-01-31

## 2017-01-31 ENCOUNTER — OUTPATIENT (OUTPATIENT)
Dept: OUTPATIENT SERVICES | Facility: HOSPITAL | Age: 27
LOS: 1 days | End: 2017-01-31
Payer: MEDICAID

## 2017-01-31 VITALS
SYSTOLIC BLOOD PRESSURE: 90 MMHG | WEIGHT: 139 LBS | HEIGHT: 64 IN | BODY MASS INDEX: 23.73 KG/M2 | DIASTOLIC BLOOD PRESSURE: 60 MMHG

## 2017-01-31 DIAGNOSIS — Z86.79 PERSONAL HISTORY OF OTHER DISEASES OF THE CIRCULATORY SYSTEM: ICD-10-CM

## 2017-01-31 DIAGNOSIS — Z83.49 FAMILY HISTORY OF OTHER ENDOCRINE, NUTRITIONAL AND METABOLIC DISEASES: ICD-10-CM

## 2017-01-31 DIAGNOSIS — N92.6 IRREGULAR MENSTRUATION, UNSPECIFIED: ICD-10-CM

## 2017-01-31 DIAGNOSIS — O00.90 UNSPECIFIED ECTOPIC PREGNANCY WITHOUT INTRAUTERINE PREGNANCY: Chronic | ICD-10-CM

## 2017-01-31 DIAGNOSIS — Z01.419 ENCOUNTER FOR GYNECOLOGICAL EXAMINATION (GENERAL) (ROUTINE) WITHOUT ABNORMAL FINDINGS: ICD-10-CM

## 2017-01-31 DIAGNOSIS — G43.009 MIGRAINE W/OUT AURA, NOT INTRACTABLE, W/OUT STATUS MIGRAINOSUS: ICD-10-CM

## 2017-01-31 DIAGNOSIS — N76.0 ACUTE VAGINITIS: ICD-10-CM

## 2017-01-31 PROCEDURE — 87491 CHLMYD TRACH DNA AMP PROBE: CPT

## 2017-01-31 PROCEDURE — 87591 N.GONORRHOEAE DNA AMP PROB: CPT

## 2017-01-31 PROCEDURE — G0463: CPT

## 2017-02-02 LAB
C TRACH RRNA SPEC QL NAA+PROBE: SIGNIFICANT CHANGE UP
C TRACH RRNA SPEC QL NAA+PROBE: SIGNIFICANT CHANGE UP
GC AMPLIFICATION INTERPRETATION: SIGNIFICANT CHANGE UP
N GONORRHOEA RRNA SPEC QL NAA+PROBE: SIGNIFICANT CHANGE UP
SPECIMEN SOURCE: SIGNIFICANT CHANGE UP

## 2017-02-03 LAB — CYTOLOGY SPEC DOC CYTO: SIGNIFICANT CHANGE UP

## 2017-02-13 ENCOUNTER — APPOINTMENT (OUTPATIENT)
Dept: OBGYN | Facility: CLINIC | Age: 27
End: 2017-02-13

## 2017-02-13 ENCOUNTER — OUTPATIENT (OUTPATIENT)
Dept: OUTPATIENT SERVICES | Facility: HOSPITAL | Age: 27
LOS: 1 days | End: 2017-02-13
Payer: SELF-PAY

## 2017-02-13 VITALS — WEIGHT: 141 LBS | DIASTOLIC BLOOD PRESSURE: 74 MMHG | SYSTOLIC BLOOD PRESSURE: 118 MMHG

## 2017-02-13 DIAGNOSIS — N76.0 ACUTE VAGINITIS: ICD-10-CM

## 2017-02-13 DIAGNOSIS — N92.6 IRREGULAR MENSTRUATION, UNSPECIFIED: ICD-10-CM

## 2017-02-13 DIAGNOSIS — O00.90 UNSPECIFIED ECTOPIC PREGNANCY WITHOUT INTRAUTERINE PREGNANCY: Chronic | ICD-10-CM

## 2017-02-13 PROCEDURE — G0463: CPT

## 2017-02-15 ENCOUNTER — LABORATORY RESULT (OUTPATIENT)
Age: 27
End: 2017-02-15

## 2017-02-15 ENCOUNTER — APPOINTMENT (OUTPATIENT)
Dept: INTERNAL MEDICINE | Facility: CLINIC | Age: 27
End: 2017-02-15

## 2017-02-15 ENCOUNTER — OUTPATIENT (OUTPATIENT)
Dept: OUTPATIENT SERVICES | Facility: HOSPITAL | Age: 27
LOS: 1 days | End: 2017-02-15
Payer: SELF-PAY

## 2017-02-15 VITALS — WEIGHT: 141 LBS | DIASTOLIC BLOOD PRESSURE: 69 MMHG | SYSTOLIC BLOOD PRESSURE: 119 MMHG

## 2017-02-15 DIAGNOSIS — I10 ESSENTIAL (PRIMARY) HYPERTENSION: ICD-10-CM

## 2017-02-15 DIAGNOSIS — O00.90 UNSPECIFIED ECTOPIC PREGNANCY WITHOUT INTRAUTERINE PREGNANCY: Chronic | ICD-10-CM

## 2017-02-15 DIAGNOSIS — Z87.891 PERSONAL HISTORY OF NICOTINE DEPENDENCE: ICD-10-CM

## 2017-02-15 LAB
HCT VFR BLD CALC: 37.6 % — SIGNIFICANT CHANGE UP (ref 34.5–45)
HGB BLD-MCNC: 12.2 G/DL — SIGNIFICANT CHANGE UP (ref 11.5–15.5)
MCHC RBC-ENTMCNC: 31 PG — SIGNIFICANT CHANGE UP (ref 27–34)
MCHC RBC-ENTMCNC: 32.4 GM/DL — SIGNIFICANT CHANGE UP (ref 32–36)
MCV RBC AUTO: 95.7 FL — SIGNIFICANT CHANGE UP (ref 80–100)
PLATELET # BLD AUTO: 200 K/UL — SIGNIFICANT CHANGE UP (ref 150–400)
RBC # BLD: 3.93 M/UL — SIGNIFICANT CHANGE UP (ref 3.8–5.2)
RBC # FLD: 13.9 % — SIGNIFICANT CHANGE UP (ref 10.3–14.5)
WBC # BLD: 5.62 K/UL — SIGNIFICANT CHANGE UP (ref 3.8–10.5)
WBC # FLD AUTO: 5.62 K/UL — SIGNIFICANT CHANGE UP (ref 3.8–10.5)

## 2017-02-15 PROCEDURE — 84443 ASSAY THYROID STIM HORMONE: CPT

## 2017-02-15 PROCEDURE — 90471 IMMUNIZATION ADMIN: CPT

## 2017-02-15 PROCEDURE — G0008: CPT

## 2017-02-15 PROCEDURE — 90715 TDAP VACCINE 7 YRS/> IM: CPT

## 2017-02-15 PROCEDURE — 90688 IIV4 VACCINE SPLT 0.5 ML IM: CPT

## 2017-02-15 PROCEDURE — G0463: CPT

## 2017-02-15 PROCEDURE — 85027 COMPLETE CBC AUTOMATED: CPT

## 2017-02-16 DIAGNOSIS — O00.90 UNSPECIFIED ECTOPIC PREGNANCY WITHOUT INTRAUTERINE PREGNANCY: ICD-10-CM

## 2017-02-16 DIAGNOSIS — G43.909 MIGRAINE, UNSPECIFIED, NOT INTRACTABLE, WITHOUT STATUS MIGRAINOSUS: ICD-10-CM

## 2017-02-16 DIAGNOSIS — Z00.00 ENCOUNTER FOR GENERAL ADULT MEDICAL EXAMINATION WITHOUT ABNORMAL FINDINGS: ICD-10-CM

## 2017-02-16 DIAGNOSIS — B35.1 TINEA UNGUIUM: ICD-10-CM

## 2017-02-16 DIAGNOSIS — Z23 ENCOUNTER FOR IMMUNIZATION: ICD-10-CM

## 2017-02-16 DIAGNOSIS — Z87.891 PERSONAL HISTORY OF NICOTINE DEPENDENCE: ICD-10-CM

## 2017-02-16 DIAGNOSIS — S06.9X9A UNSPECIFIED INTRACRANIAL INJURY WITH LOSS OF CONSCIOUSNESS OF UNSPECIFIED DURATION, INITIAL ENCOUNTER: ICD-10-CM

## 2017-02-16 LAB — TSH SERPL-MCNC: 1.64 UIU/ML — SIGNIFICANT CHANGE UP (ref 0.27–4.2)

## 2017-02-23 ENCOUNTER — FORM ENCOUNTER (OUTPATIENT)
Age: 27
End: 2017-02-23

## 2017-02-24 ENCOUNTER — OUTPATIENT (OUTPATIENT)
Dept: OUTPATIENT SERVICES | Facility: HOSPITAL | Age: 27
LOS: 1 days | End: 2017-02-24
Payer: COMMERCIAL

## 2017-02-24 ENCOUNTER — APPOINTMENT (OUTPATIENT)
Dept: CT IMAGING | Facility: CLINIC | Age: 27
End: 2017-02-24

## 2017-02-24 DIAGNOSIS — O00.90 UNSPECIFIED ECTOPIC PREGNANCY WITHOUT INTRAUTERINE PREGNANCY: Chronic | ICD-10-CM

## 2017-02-24 DIAGNOSIS — R05 COUGH: ICD-10-CM

## 2017-02-24 DIAGNOSIS — R51 HEADACHE: ICD-10-CM

## 2017-02-24 PROCEDURE — 70450 CT HEAD/BRAIN W/O DYE: CPT

## 2017-03-06 ENCOUNTER — LABORATORY RESULT (OUTPATIENT)
Age: 27
End: 2017-03-06

## 2017-03-06 ENCOUNTER — OUTPATIENT (OUTPATIENT)
Dept: OUTPATIENT SERVICES | Facility: HOSPITAL | Age: 27
LOS: 1 days | End: 2017-03-06
Payer: SELF-PAY

## 2017-03-06 ENCOUNTER — RESULT CHARGE (OUTPATIENT)
Age: 27
End: 2017-03-06

## 2017-03-06 ENCOUNTER — APPOINTMENT (OUTPATIENT)
Dept: OBGYN | Facility: CLINIC | Age: 27
End: 2017-03-06

## 2017-03-06 VITALS — WEIGHT: 143 LBS | DIASTOLIC BLOOD PRESSURE: 78 MMHG | SYSTOLIC BLOOD PRESSURE: 120 MMHG

## 2017-03-06 DIAGNOSIS — N76.0 ACUTE VAGINITIS: ICD-10-CM

## 2017-03-06 DIAGNOSIS — O00.90 UNSPECIFIED ECTOPIC PREGNANCY WITHOUT INTRAUTERINE PREGNANCY: Chronic | ICD-10-CM

## 2017-03-07 LAB
C TRACH RRNA SPEC QL NAA+PROBE: SIGNIFICANT CHANGE UP
N GONORRHOEA RRNA SPEC QL NAA+PROBE: SIGNIFICANT CHANGE UP
SPECIMEN SOURCE: SIGNIFICANT CHANGE UP

## 2017-03-07 PROCEDURE — G0463: CPT

## 2017-03-12 PROCEDURE — 80053 COMPREHEN METABOLIC PANEL: CPT

## 2017-03-12 PROCEDURE — 88305 TISSUE EXAM BY PATHOLOGIST: CPT

## 2017-03-12 PROCEDURE — C1889: CPT

## 2017-03-12 PROCEDURE — 87086 URINE CULTURE/COLONY COUNT: CPT

## 2017-03-12 PROCEDURE — 81001 URINALYSIS AUTO W/SCOPE: CPT

## 2017-03-12 PROCEDURE — 88304 TISSUE EXAM BY PATHOLOGIST: CPT

## 2017-03-12 PROCEDURE — 85027 COMPLETE CBC AUTOMATED: CPT

## 2017-03-12 PROCEDURE — 86850 RBC ANTIBODY SCREEN: CPT

## 2017-03-12 PROCEDURE — 86901 BLOOD TYPING SEROLOGIC RH(D): CPT

## 2017-03-12 PROCEDURE — 99285 EMERGENCY DEPT VISIT HI MDM: CPT | Mod: 25

## 2017-03-12 PROCEDURE — 93975 VASCULAR STUDY: CPT

## 2017-03-12 PROCEDURE — 84702 CHORIONIC GONADOTROPIN TEST: CPT

## 2017-03-12 PROCEDURE — 86900 BLOOD TYPING SEROLOGIC ABO: CPT

## 2017-03-12 PROCEDURE — 76817 TRANSVAGINAL US OBSTETRIC: CPT

## 2017-03-16 DIAGNOSIS — Z30.430 ENCOUNTER FOR INSERTION OF INTRAUTERINE CONTRACEPTIVE DEVICE: ICD-10-CM

## 2017-04-03 ENCOUNTER — APPOINTMENT (OUTPATIENT)
Dept: OBGYN | Facility: CLINIC | Age: 27
End: 2017-04-03

## 2017-04-20 ENCOUNTER — APPOINTMENT (OUTPATIENT)
Dept: NEUROLOGY | Facility: HOSPITAL | Age: 27
End: 2017-04-20

## 2017-06-19 ENCOUNTER — OUTPATIENT (OUTPATIENT)
Dept: OUTPATIENT SERVICES | Facility: HOSPITAL | Age: 27
LOS: 1 days | End: 2017-06-19
Payer: SELF-PAY

## 2017-06-19 ENCOUNTER — APPOINTMENT (OUTPATIENT)
Dept: OBGYN | Facility: CLINIC | Age: 27
End: 2017-06-19

## 2017-06-19 VITALS — WEIGHT: 139 LBS | SYSTOLIC BLOOD PRESSURE: 112 MMHG | DIASTOLIC BLOOD PRESSURE: 74 MMHG

## 2017-06-19 DIAGNOSIS — O00.90 UNSPECIFIED ECTOPIC PREGNANCY WITHOUT INTRAUTERINE PREGNANCY: Chronic | ICD-10-CM

## 2017-06-19 DIAGNOSIS — N76.0 ACUTE VAGINITIS: ICD-10-CM

## 2017-06-19 DIAGNOSIS — N94.89 OTHER SPECIFIED CONDITIONS ASSOCIATED WITH FEMALE GENITAL ORGANS AND MENSTRUAL CYCLE: ICD-10-CM

## 2017-06-19 PROCEDURE — G0463: CPT

## 2017-06-19 RX ORDER — INFANT FORMULA, IRON/DHA/ARA 2.1 G/1
100 POWDER (GRAM) ORAL
Qty: 1 | Refills: 11 | Status: COMPLETED | COMMUNITY
Start: 2017-02-15 | End: 2017-06-19

## 2017-06-19 RX ORDER — UREA 40 G/100ML
40 GEL TOPICAL
Qty: 1 | Refills: 11 | Status: COMPLETED | COMMUNITY
Start: 2017-02-15 | End: 2017-06-19

## 2017-06-26 DIAGNOSIS — N94.89 OTHER SPECIFIED CONDITIONS ASSOCIATED WITH FEMALE GENITAL ORGANS AND MENSTRUAL CYCLE: ICD-10-CM

## 2017-06-26 DIAGNOSIS — R30.0 DYSURIA: ICD-10-CM

## 2017-06-26 DIAGNOSIS — Z30.431 ENCOUNTER FOR ROUTINE CHECKING OF INTRAUTERINE CONTRACEPTIVE DEVICE: ICD-10-CM

## 2017-07-13 ENCOUNTER — APPOINTMENT (OUTPATIENT)
Dept: INTERNAL MEDICINE | Facility: CLINIC | Age: 27
End: 2017-07-13

## 2017-07-13 ENCOUNTER — OUTPATIENT (OUTPATIENT)
Dept: OUTPATIENT SERVICES | Facility: HOSPITAL | Age: 27
LOS: 1 days | End: 2017-07-13
Payer: SELF-PAY

## 2017-07-13 VITALS
SYSTOLIC BLOOD PRESSURE: 100 MMHG | BODY MASS INDEX: 23.56 KG/M2 | HEIGHT: 64 IN | WEIGHT: 138 LBS | DIASTOLIC BLOOD PRESSURE: 70 MMHG

## 2017-07-13 DIAGNOSIS — I10 ESSENTIAL (PRIMARY) HYPERTENSION: ICD-10-CM

## 2017-07-13 DIAGNOSIS — O00.90 UNSPECIFIED ECTOPIC PREGNANCY WITHOUT INTRAUTERINE PREGNANCY: Chronic | ICD-10-CM

## 2017-07-13 PROCEDURE — G0463: CPT

## 2017-07-18 DIAGNOSIS — L25.9 UNSPECIFIED CONTACT DERMATITIS, UNSPECIFIED CAUSE: ICD-10-CM

## 2017-07-18 DIAGNOSIS — R51 HEADACHE: ICD-10-CM

## 2017-09-13 ENCOUNTER — APPOINTMENT (OUTPATIENT)
Dept: INTERNAL MEDICINE | Facility: CLINIC | Age: 27
End: 2017-09-13

## 2017-09-13 ENCOUNTER — OUTPATIENT (OUTPATIENT)
Dept: OUTPATIENT SERVICES | Facility: HOSPITAL | Age: 27
LOS: 1 days | End: 2017-09-13
Payer: SELF-PAY

## 2017-09-13 VITALS
DIASTOLIC BLOOD PRESSURE: 70 MMHG | SYSTOLIC BLOOD PRESSURE: 110 MMHG | HEART RATE: 70 BPM | OXYGEN SATURATION: 90 % | WEIGHT: 135 LBS | BODY MASS INDEX: 23.17 KG/M2

## 2017-09-13 DIAGNOSIS — O00.90 UNSPECIFIED ECTOPIC PREGNANCY WITHOUT INTRAUTERINE PREGNANCY: Chronic | ICD-10-CM

## 2017-09-13 LAB — HCG UR QL: NEGATIVE

## 2017-09-13 PROCEDURE — G0463: CPT

## 2017-09-13 RX ORDER — MUPIROCIN 20 MG/G
2 OINTMENT TOPICAL TWICE DAILY
Qty: 1 | Refills: 0 | Status: COMPLETED | COMMUNITY
Start: 2017-07-13 | End: 2017-09-13

## 2017-09-14 DIAGNOSIS — I10 ESSENTIAL (PRIMARY) HYPERTENSION: ICD-10-CM

## 2017-09-14 DIAGNOSIS — R51 HEADACHE: ICD-10-CM

## 2017-09-25 DIAGNOSIS — B35.1 TINEA UNGUIUM: ICD-10-CM

## 2017-09-25 DIAGNOSIS — L03.019 CELLULITIS OF UNSPECIFIED FINGER: ICD-10-CM

## 2017-09-28 ENCOUNTER — OUTPATIENT (OUTPATIENT)
Dept: OUTPATIENT SERVICES | Facility: HOSPITAL | Age: 27
LOS: 1 days | End: 2017-09-28
Payer: SELF-PAY

## 2017-09-28 ENCOUNTER — APPOINTMENT (OUTPATIENT)
Dept: INTERNAL MEDICINE | Facility: CLINIC | Age: 27
End: 2017-09-28

## 2017-09-28 VITALS
RESPIRATION RATE: 14 BRPM | WEIGHT: 139 LBS | SYSTOLIC BLOOD PRESSURE: 110 MMHG | BODY MASS INDEX: 23.86 KG/M2 | HEART RATE: 64 BPM | DIASTOLIC BLOOD PRESSURE: 70 MMHG

## 2017-09-28 DIAGNOSIS — I10 ESSENTIAL (PRIMARY) HYPERTENSION: ICD-10-CM

## 2017-09-28 DIAGNOSIS — N92.6 IRREGULAR MENSTRUATION, UNSPECIFIED: ICD-10-CM

## 2017-09-28 DIAGNOSIS — O00.90 UNSPECIFIED ECTOPIC PREGNANCY WITHOUT INTRAUTERINE PREGNANCY: Chronic | ICD-10-CM

## 2017-09-28 DIAGNOSIS — Z87.898 PERSONAL HISTORY OF OTHER SPECIFIED CONDITIONS: ICD-10-CM

## 2017-09-28 DIAGNOSIS — Z87.820 PERSONAL HISTORY OF TRAUMATIC BRAIN INJURY: ICD-10-CM

## 2017-09-28 DIAGNOSIS — Z87.2 PERSONAL HISTORY OF DISEASES OF THE SKIN AND SUBCUTANEOUS TISSUE: ICD-10-CM

## 2017-09-28 DIAGNOSIS — Z87.59 PERSONAL HISTORY OF OTHER COMPLICATIONS OF PREGNANCY, CHILDBIRTH AND THE PUERPERIUM: ICD-10-CM

## 2017-09-28 DIAGNOSIS — Z86.19 PERSONAL HISTORY OF OTHER INFECTIOUS AND PARASITIC DISEASES: ICD-10-CM

## 2017-09-28 PROCEDURE — 90688 IIV4 VACCINE SPLT 0.5 ML IM: CPT

## 2017-09-28 PROCEDURE — G0008: CPT

## 2017-09-28 PROCEDURE — G0463: CPT

## 2017-09-28 RX ORDER — CEPHALEXIN 500 MG/1
500 TABLET ORAL
Qty: 14 | Refills: 0 | Status: COMPLETED | COMMUNITY
Start: 2017-09-13 | End: 2017-09-28

## 2017-10-06 DIAGNOSIS — Z01.419 ENCOUNTER FOR GYNECOLOGICAL EXAMINATION (GENERAL) (ROUTINE) WITHOUT ABNORMAL FINDINGS: ICD-10-CM

## 2017-10-06 DIAGNOSIS — Z87.2 PERSONAL HISTORY OF DISEASES OF THE SKIN AND SUBCUTANEOUS TISSUE: ICD-10-CM

## 2017-10-06 DIAGNOSIS — Z87.898 PERSONAL HISTORY OF OTHER SPECIFIED CONDITIONS: ICD-10-CM

## 2017-10-06 DIAGNOSIS — Z23 ENCOUNTER FOR IMMUNIZATION: ICD-10-CM

## 2017-10-06 DIAGNOSIS — Z87.820 PERSONAL HISTORY OF TRAUMATIC BRAIN INJURY: ICD-10-CM

## 2017-10-06 DIAGNOSIS — N63 UNSPECIFIED LUMP IN BREAST: ICD-10-CM

## 2017-10-06 DIAGNOSIS — N92.6 IRREGULAR MENSTRUATION, UNSPECIFIED: ICD-10-CM

## 2017-10-06 DIAGNOSIS — Z86.19 PERSONAL HISTORY OF OTHER INFECTIOUS AND PARASITIC DISEASES: ICD-10-CM

## 2017-10-13 ENCOUNTER — APPOINTMENT (OUTPATIENT)
Dept: PODIATRY | Facility: HOSPITAL | Age: 27
End: 2017-10-13

## 2017-10-26 ENCOUNTER — APPOINTMENT (OUTPATIENT)
Dept: ULTRASOUND IMAGING | Facility: IMAGING CENTER | Age: 27
End: 2017-10-26
Payer: COMMERCIAL

## 2017-10-26 ENCOUNTER — OUTPATIENT (OUTPATIENT)
Dept: OUTPATIENT SERVICES | Facility: HOSPITAL | Age: 27
LOS: 1 days | End: 2017-10-26
Payer: SELF-PAY

## 2017-10-26 DIAGNOSIS — N92.6 IRREGULAR MENSTRUATION, UNSPECIFIED: ICD-10-CM

## 2017-10-26 DIAGNOSIS — N63 UNSPECIFIED LUMP IN BREAST: ICD-10-CM

## 2017-10-26 DIAGNOSIS — O00.90 UNSPECIFIED ECTOPIC PREGNANCY WITHOUT INTRAUTERINE PREGNANCY: Chronic | ICD-10-CM

## 2017-10-26 DIAGNOSIS — Z01.419 ENCOUNTER FOR GYNECOLOGICAL EXAMINATION (GENERAL) (ROUTINE) WITHOUT ABNORMAL FINDINGS: ICD-10-CM

## 2017-10-26 DIAGNOSIS — Z23 ENCOUNTER FOR IMMUNIZATION: ICD-10-CM

## 2017-10-26 DIAGNOSIS — N63.0 UNSPECIFIED LUMP IN UNSPECIFIED BREAST: ICD-10-CM

## 2017-10-26 DIAGNOSIS — Z87.2 PERSONAL HISTORY OF DISEASES OF THE SKIN AND SUBCUTANEOUS TISSUE: ICD-10-CM

## 2017-10-26 DIAGNOSIS — Z87.820 PERSONAL HISTORY OF TRAUMATIC BRAIN INJURY: ICD-10-CM

## 2017-10-26 DIAGNOSIS — Z87.898 PERSONAL HISTORY OF OTHER SPECIFIED CONDITIONS: ICD-10-CM

## 2017-10-26 DIAGNOSIS — Z86.19 PERSONAL HISTORY OF OTHER INFECTIOUS AND PARASITIC DISEASES: ICD-10-CM

## 2017-10-26 PROCEDURE — 76641 ULTRASOUND BREAST COMPLETE: CPT | Mod: 26,50

## 2017-10-26 PROCEDURE — 76641 ULTRASOUND BREAST COMPLETE: CPT

## 2017-10-30 DIAGNOSIS — R92.8 OTHER ABNORMAL AND INCONCLUSIVE FINDINGS ON DIAGNOSTIC IMAGING OF BREAST: ICD-10-CM

## 2017-11-17 ENCOUNTER — APPOINTMENT (OUTPATIENT)
Dept: PODIATRY | Facility: HOSPITAL | Age: 27
End: 2017-11-17

## 2017-11-17 ENCOUNTER — OUTPATIENT (OUTPATIENT)
Dept: OUTPATIENT SERVICES | Facility: HOSPITAL | Age: 27
LOS: 1 days | End: 2017-11-17
Payer: SELF-PAY

## 2017-11-17 VITALS
HEART RATE: 66 BPM | RESPIRATION RATE: 14 BRPM | BODY MASS INDEX: 23.56 KG/M2 | HEIGHT: 64 IN | WEIGHT: 138 LBS | SYSTOLIC BLOOD PRESSURE: 108 MMHG | DIASTOLIC BLOOD PRESSURE: 74 MMHG

## 2017-11-17 DIAGNOSIS — M21.622 BUNIONETTE OF LEFT FOOT: ICD-10-CM

## 2017-11-17 DIAGNOSIS — O00.90 UNSPECIFIED ECTOPIC PREGNANCY WITHOUT INTRAUTERINE PREGNANCY: Chronic | ICD-10-CM

## 2017-11-17 DIAGNOSIS — M20.40 OTHER HAMMER TOE(S) (ACQUIRED), UNSPECIFIED FOOT: ICD-10-CM

## 2017-11-17 DIAGNOSIS — Z00.00 ENCOUNTER FOR GENERAL ADULT MEDICAL EXAMINATION WITHOUT ABNORMAL FINDINGS: ICD-10-CM

## 2017-11-17 DIAGNOSIS — M79.609 PAIN IN UNSPECIFIED LIMB: ICD-10-CM

## 2017-11-17 DIAGNOSIS — B35.1 TINEA UNGUIUM: ICD-10-CM

## 2017-11-17 LAB
ALBUMIN SERPL ELPH-MCNC: 4.3 G/DL — SIGNIFICANT CHANGE UP (ref 3.3–5)
ALP SERPL-CCNC: 55 U/L — SIGNIFICANT CHANGE UP (ref 40–120)
ALT FLD-CCNC: 19 U/L — SIGNIFICANT CHANGE UP (ref 10–45)
AST SERPL-CCNC: 20 U/L — SIGNIFICANT CHANGE UP (ref 10–40)
BILIRUB DIRECT SERPL-MCNC: 0.1 MG/DL — SIGNIFICANT CHANGE UP (ref 0–0.2)
BILIRUB INDIRECT FLD-MCNC: 0.1 MG/DL — LOW (ref 0.2–1)
BILIRUB SERPL-MCNC: 0.2 MG/DL — SIGNIFICANT CHANGE UP (ref 0.2–1.2)
PROT SERPL-MCNC: 7.8 G/DL — SIGNIFICANT CHANGE UP (ref 6–8.3)

## 2017-11-17 PROCEDURE — 80076 HEPATIC FUNCTION PANEL: CPT

## 2017-11-17 PROCEDURE — G0463: CPT

## 2017-11-17 PROCEDURE — 73630 X-RAY EXAM OF FOOT: CPT | Mod: 26,50

## 2017-11-17 PROCEDURE — 73630 X-RAY EXAM OF FOOT: CPT

## 2017-11-17 PROCEDURE — 36415 COLL VENOUS BLD VENIPUNCTURE: CPT

## 2017-12-01 ENCOUNTER — APPOINTMENT (OUTPATIENT)
Dept: PODIATRY | Facility: HOSPITAL | Age: 27
End: 2017-12-01

## 2017-12-01 ENCOUNTER — OUTPATIENT (OUTPATIENT)
Dept: OUTPATIENT SERVICES | Facility: HOSPITAL | Age: 27
LOS: 1 days | End: 2017-12-01
Payer: SELF-PAY

## 2017-12-01 VITALS
HEART RATE: 80 BPM | BODY MASS INDEX: 23.39 KG/M2 | HEIGHT: 64 IN | DIASTOLIC BLOOD PRESSURE: 75 MMHG | WEIGHT: 137 LBS | SYSTOLIC BLOOD PRESSURE: 107 MMHG

## 2017-12-01 DIAGNOSIS — M79.609 PAIN IN UNSPECIFIED LIMB: ICD-10-CM

## 2017-12-01 DIAGNOSIS — L60.3 NAIL DYSTROPHY: ICD-10-CM

## 2017-12-01 DIAGNOSIS — B35.1 TINEA UNGUIUM: ICD-10-CM

## 2017-12-01 DIAGNOSIS — O00.90 UNSPECIFIED ECTOPIC PREGNANCY WITHOUT INTRAUTERINE PREGNANCY: Chronic | ICD-10-CM

## 2017-12-01 DIAGNOSIS — M21.622 BUNIONETTE OF LEFT FOOT: ICD-10-CM

## 2017-12-01 PROCEDURE — G0463: CPT

## 2018-03-12 ENCOUNTER — APPOINTMENT (OUTPATIENT)
Dept: OBGYN | Facility: CLINIC | Age: 28
End: 2018-03-12
Payer: COMMERCIAL

## 2018-03-12 ENCOUNTER — OUTPATIENT (OUTPATIENT)
Dept: OUTPATIENT SERVICES | Facility: HOSPITAL | Age: 28
LOS: 1 days | End: 2018-03-12
Payer: SELF-PAY

## 2018-03-12 VITALS — WEIGHT: 152 LBS | BODY MASS INDEX: 26.09 KG/M2 | DIASTOLIC BLOOD PRESSURE: 70 MMHG | SYSTOLIC BLOOD PRESSURE: 110 MMHG

## 2018-03-12 DIAGNOSIS — O00.90 UNSPECIFIED ECTOPIC PREGNANCY WITHOUT INTRAUTERINE PREGNANCY: Chronic | ICD-10-CM

## 2018-03-12 DIAGNOSIS — Z30.431 ENCOUNTER FOR ROUTINE CHECKING OF INTRAUTERINE CONTRACEPTIVE DEVICE: ICD-10-CM

## 2018-03-12 DIAGNOSIS — N76.0 ACUTE VAGINITIS: ICD-10-CM

## 2018-03-12 PROCEDURE — G0463: CPT

## 2018-03-12 PROCEDURE — 99213 OFFICE O/P EST LOW 20 MIN: CPT | Mod: NC

## 2018-03-16 DIAGNOSIS — R10.2 PELVIC AND PERINEAL PAIN: ICD-10-CM

## 2018-03-16 DIAGNOSIS — Z30.431 ENCOUNTER FOR ROUTINE CHECKING OF INTRAUTERINE CONTRACEPTIVE DEVICE: ICD-10-CM

## 2018-03-23 ENCOUNTER — APPOINTMENT (OUTPATIENT)
Dept: INTERNAL MEDICINE | Facility: CLINIC | Age: 28
End: 2018-03-23

## 2018-03-23 ENCOUNTER — OUTPATIENT (OUTPATIENT)
Dept: OUTPATIENT SERVICES | Facility: HOSPITAL | Age: 28
LOS: 1 days | End: 2018-03-23
Payer: SELF-PAY

## 2018-03-23 VITALS
DIASTOLIC BLOOD PRESSURE: 70 MMHG | WEIGHT: 154 LBS | BODY MASS INDEX: 26.29 KG/M2 | SYSTOLIC BLOOD PRESSURE: 118 MMHG | HEIGHT: 64 IN

## 2018-03-23 DIAGNOSIS — M79.671 PAIN IN RIGHT FOOT: ICD-10-CM

## 2018-03-23 DIAGNOSIS — M79.672 PAIN IN RIGHT FOOT: ICD-10-CM

## 2018-03-23 DIAGNOSIS — O00.90 UNSPECIFIED ECTOPIC PREGNANCY WITHOUT INTRAUTERINE PREGNANCY: Chronic | ICD-10-CM

## 2018-03-23 DIAGNOSIS — Z30.431 ENCOUNTER FOR ROUTINE CHECKING OF INTRAUTERINE CONTRACEPTIVE DEVICE: ICD-10-CM

## 2018-03-23 DIAGNOSIS — I10 ESSENTIAL (PRIMARY) HYPERTENSION: ICD-10-CM

## 2018-03-23 DIAGNOSIS — R10.2 PELVIC AND PERINEAL PAIN: ICD-10-CM

## 2018-03-23 PROCEDURE — G0463: CPT

## 2018-03-25 PROBLEM — M79.671 PAIN IN BOTH FEET: Status: ACTIVE | Noted: 2018-03-23

## 2018-03-30 DIAGNOSIS — M79.672 PAIN IN LEFT FOOT: ICD-10-CM

## 2018-03-30 DIAGNOSIS — M79.671 PAIN IN RIGHT FOOT: ICD-10-CM

## 2018-04-01 ENCOUNTER — FORM ENCOUNTER (OUTPATIENT)
Age: 28
End: 2018-04-01

## 2018-04-02 ENCOUNTER — APPOINTMENT (OUTPATIENT)
Dept: ULTRASOUND IMAGING | Facility: CLINIC | Age: 28
End: 2018-04-02
Payer: COMMERCIAL

## 2018-04-02 ENCOUNTER — OUTPATIENT (OUTPATIENT)
Dept: OUTPATIENT SERVICES | Facility: HOSPITAL | Age: 28
LOS: 1 days | End: 2018-04-02
Payer: SELF-PAY

## 2018-04-02 DIAGNOSIS — M79.672 PAIN IN LEFT FOOT: ICD-10-CM

## 2018-04-02 DIAGNOSIS — O00.90 UNSPECIFIED ECTOPIC PREGNANCY WITHOUT INTRAUTERINE PREGNANCY: Chronic | ICD-10-CM

## 2018-04-02 DIAGNOSIS — Z30.431 ENCOUNTER FOR ROUTINE CHECKING OF INTRAUTERINE CONTRACEPTIVE DEVICE: ICD-10-CM

## 2018-04-02 DIAGNOSIS — M79.671 PAIN IN RIGHT FOOT: ICD-10-CM

## 2018-04-02 PROCEDURE — 76856 US EXAM PELVIC COMPLETE: CPT

## 2018-04-02 PROCEDURE — 76830 TRANSVAGINAL US NON-OB: CPT | Mod: 26

## 2018-04-02 PROCEDURE — 76856 US EXAM PELVIC COMPLETE: CPT | Mod: 26

## 2018-04-02 PROCEDURE — 76830 TRANSVAGINAL US NON-OB: CPT

## 2018-04-20 ENCOUNTER — APPOINTMENT (OUTPATIENT)
Dept: PODIATRY | Facility: HOSPITAL | Age: 28
End: 2018-04-20

## 2018-04-20 ENCOUNTER — OUTPATIENT (OUTPATIENT)
Dept: OUTPATIENT SERVICES | Facility: HOSPITAL | Age: 28
LOS: 1 days | End: 2018-04-20
Payer: SELF-PAY

## 2018-04-20 DIAGNOSIS — M20.11 HALLUX VALGUS (ACQUIRED), RIGHT FOOT: ICD-10-CM

## 2018-04-20 DIAGNOSIS — M20.40 OTHER HAMMER TOE(S) (ACQUIRED), UNSPECIFIED FOOT: ICD-10-CM

## 2018-04-20 DIAGNOSIS — B35.1 TINEA UNGUIUM: ICD-10-CM

## 2018-04-20 DIAGNOSIS — M20.12 HALLUX VALGUS (ACQUIRED), LEFT FOOT: ICD-10-CM

## 2018-04-20 DIAGNOSIS — M79.609 PAIN IN UNSPECIFIED LIMB: ICD-10-CM

## 2018-04-20 DIAGNOSIS — M21.622 BUNIONETTE OF LEFT FOOT: ICD-10-CM

## 2018-04-20 DIAGNOSIS — O00.90 UNSPECIFIED ECTOPIC PREGNANCY WITHOUT INTRAUTERINE PREGNANCY: Chronic | ICD-10-CM

## 2018-04-20 PROCEDURE — G0463: CPT

## 2018-04-24 ENCOUNTER — EMERGENCY (EMERGENCY)
Facility: HOSPITAL | Age: 28
LOS: 1 days | Discharge: HOME | End: 2018-04-24
Attending: EMERGENCY MEDICINE
Payer: SELF-PAY

## 2018-04-24 VITALS
TEMPERATURE: 98 F | OXYGEN SATURATION: 100 % | HEART RATE: 74 BPM | SYSTOLIC BLOOD PRESSURE: 123 MMHG | DIASTOLIC BLOOD PRESSURE: 62 MMHG | RESPIRATION RATE: 20 BRPM

## 2018-04-24 DIAGNOSIS — O00.90 UNSPECIFIED ECTOPIC PREGNANCY WITHOUT INTRAUTERINE PREGNANCY: Chronic | ICD-10-CM

## 2018-04-24 LAB
ALBUMIN SERPL ELPH-MCNC: 4.3 G/DL — SIGNIFICANT CHANGE UP (ref 3.3–5)
ALP SERPL-CCNC: 81 U/L — SIGNIFICANT CHANGE UP (ref 40–120)
ALT FLD-CCNC: 23 U/L — SIGNIFICANT CHANGE UP (ref 10–45)
ANION GAP SERPL CALC-SCNC: 11 MMOL/L — SIGNIFICANT CHANGE UP (ref 5–17)
APPEARANCE UR: CLEAR — SIGNIFICANT CHANGE UP
AST SERPL-CCNC: 21 U/L — SIGNIFICANT CHANGE UP (ref 10–40)
BACTERIA # UR AUTO: ABNORMAL /HPF
BASOPHILS # BLD AUTO: 0 K/UL — SIGNIFICANT CHANGE UP (ref 0–0.2)
BASOPHILS NFR BLD AUTO: 0.3 % — SIGNIFICANT CHANGE UP (ref 0–2)
BILIRUB SERPL-MCNC: 0.3 MG/DL — SIGNIFICANT CHANGE UP (ref 0.2–1.2)
BILIRUB UR-MCNC: NEGATIVE — SIGNIFICANT CHANGE UP
BUN SERPL-MCNC: 10 MG/DL — SIGNIFICANT CHANGE UP (ref 7–23)
CALCIUM SERPL-MCNC: 8.9 MG/DL — SIGNIFICANT CHANGE UP (ref 8.4–10.5)
CHLORIDE SERPL-SCNC: 104 MMOL/L — SIGNIFICANT CHANGE UP (ref 96–108)
CO2 SERPL-SCNC: 24 MMOL/L — SIGNIFICANT CHANGE UP (ref 22–31)
COLOR SPEC: COLORLESS — SIGNIFICANT CHANGE UP
CREAT SERPL-MCNC: 0.56 MG/DL — SIGNIFICANT CHANGE UP (ref 0.5–1.3)
DIFF PNL FLD: NEGATIVE — SIGNIFICANT CHANGE UP
EOSINOPHIL # BLD AUTO: 0.1 K/UL — SIGNIFICANT CHANGE UP (ref 0–0.5)
EOSINOPHIL NFR BLD AUTO: 1.6 % — SIGNIFICANT CHANGE UP (ref 0–6)
EPI CELLS # UR: SIGNIFICANT CHANGE UP /HPF
GAS PNL BLDV: SIGNIFICANT CHANGE UP
GLUCOSE SERPL-MCNC: 80 MG/DL — SIGNIFICANT CHANGE UP (ref 70–99)
GLUCOSE UR QL: NEGATIVE — SIGNIFICANT CHANGE UP
HCG SERPL-ACNC: <2 MIU/ML — LOW (ref 5–24)
HCG UR QL: NEGATIVE — SIGNIFICANT CHANGE UP
HCT VFR BLD CALC: 38.8 % — SIGNIFICANT CHANGE UP (ref 34.5–45)
HGB BLD-MCNC: 12.9 G/DL — SIGNIFICANT CHANGE UP (ref 11.5–15.5)
KETONES UR-MCNC: NEGATIVE — SIGNIFICANT CHANGE UP
LEUKOCYTE ESTERASE UR-ACNC: NEGATIVE — SIGNIFICANT CHANGE UP
LIDOCAIN IGE QN: 28 U/L — SIGNIFICANT CHANGE UP (ref 7–60)
LYMPHOCYTES # BLD AUTO: 1.8 K/UL — SIGNIFICANT CHANGE UP (ref 1–3.3)
LYMPHOCYTES # BLD AUTO: 27.8 % — SIGNIFICANT CHANGE UP (ref 13–44)
MCHC RBC-ENTMCNC: 29.4 PG — SIGNIFICANT CHANGE UP (ref 27–34)
MCHC RBC-ENTMCNC: 33.2 GM/DL — SIGNIFICANT CHANGE UP (ref 32–36)
MCV RBC AUTO: 88.6 FL — SIGNIFICANT CHANGE UP (ref 80–100)
MONOCYTES # BLD AUTO: 0.5 K/UL — SIGNIFICANT CHANGE UP (ref 0–0.9)
MONOCYTES NFR BLD AUTO: 8 % — SIGNIFICANT CHANGE UP (ref 2–14)
NEUTROPHILS # BLD AUTO: 3.9 K/UL — SIGNIFICANT CHANGE UP (ref 1.8–7.4)
NEUTROPHILS NFR BLD AUTO: 62.3 % — SIGNIFICANT CHANGE UP (ref 43–77)
NITRITE UR-MCNC: NEGATIVE — SIGNIFICANT CHANGE UP
PH UR: 7 — SIGNIFICANT CHANGE UP (ref 5–8)
PLATELET # BLD AUTO: 178 K/UL — SIGNIFICANT CHANGE UP (ref 150–400)
POTASSIUM SERPL-MCNC: 3.5 MMOL/L — SIGNIFICANT CHANGE UP (ref 3.5–5.3)
POTASSIUM SERPL-SCNC: 3.5 MMOL/L — SIGNIFICANT CHANGE UP (ref 3.5–5.3)
PROT SERPL-MCNC: 7.4 G/DL — SIGNIFICANT CHANGE UP (ref 6–8.3)
PROT UR-MCNC: NEGATIVE — SIGNIFICANT CHANGE UP
RBC # BLD: 4.38 M/UL — SIGNIFICANT CHANGE UP (ref 3.8–5.2)
RBC # FLD: 13.5 % — SIGNIFICANT CHANGE UP (ref 10.3–14.5)
SODIUM SERPL-SCNC: 139 MMOL/L — SIGNIFICANT CHANGE UP (ref 135–145)
SP GR SPEC: 1.01 — LOW (ref 1.01–1.02)
UROBILINOGEN FLD QL: NEGATIVE — SIGNIFICANT CHANGE UP
WBC # BLD: 6.3 K/UL — SIGNIFICANT CHANGE UP (ref 3.8–10.5)
WBC # FLD AUTO: 6.3 K/UL — SIGNIFICANT CHANGE UP (ref 3.8–10.5)
WBC UR QL: SIGNIFICANT CHANGE UP /HPF (ref 0–5)

## 2018-04-24 PROCEDURE — 84295 ASSAY OF SERUM SODIUM: CPT

## 2018-04-24 PROCEDURE — 76705 ECHO EXAM OF ABDOMEN: CPT | Mod: 26,RT

## 2018-04-24 PROCEDURE — 82947 ASSAY GLUCOSE BLOOD QUANT: CPT

## 2018-04-24 PROCEDURE — 99284 EMERGENCY DEPT VISIT MOD MDM: CPT

## 2018-04-24 PROCEDURE — 84132 ASSAY OF SERUM POTASSIUM: CPT

## 2018-04-24 PROCEDURE — 96374 THER/PROPH/DIAG INJ IV PUSH: CPT

## 2018-04-24 PROCEDURE — 84702 CHORIONIC GONADOTROPIN TEST: CPT

## 2018-04-24 PROCEDURE — 80053 COMPREHEN METABOLIC PANEL: CPT

## 2018-04-24 PROCEDURE — 99284 EMERGENCY DEPT VISIT MOD MDM: CPT | Mod: 25

## 2018-04-24 PROCEDURE — 83690 ASSAY OF LIPASE: CPT

## 2018-04-24 PROCEDURE — 82435 ASSAY OF BLOOD CHLORIDE: CPT

## 2018-04-24 PROCEDURE — 85014 HEMATOCRIT: CPT

## 2018-04-24 PROCEDURE — 76705 ECHO EXAM OF ABDOMEN: CPT

## 2018-04-24 PROCEDURE — 96375 TX/PRO/DX INJ NEW DRUG ADDON: CPT

## 2018-04-24 PROCEDURE — 82803 BLOOD GASES ANY COMBINATION: CPT

## 2018-04-24 PROCEDURE — 82330 ASSAY OF CALCIUM: CPT

## 2018-04-24 PROCEDURE — 81025 URINE PREGNANCY TEST: CPT

## 2018-04-24 PROCEDURE — 81001 URINALYSIS AUTO W/SCOPE: CPT

## 2018-04-24 PROCEDURE — 85027 COMPLETE CBC AUTOMATED: CPT

## 2018-04-24 PROCEDURE — 83605 ASSAY OF LACTIC ACID: CPT

## 2018-04-24 RX ORDER — LIDOCAINE 4 G/100G
10 CREAM TOPICAL ONCE
Qty: 0 | Refills: 0 | Status: COMPLETED | OUTPATIENT
Start: 2018-04-24 | End: 2018-04-24

## 2018-04-24 RX ORDER — ACETAMINOPHEN 500 MG
1000 TABLET ORAL ONCE
Qty: 0 | Refills: 0 | Status: COMPLETED | OUTPATIENT
Start: 2018-04-24 | End: 2018-04-24

## 2018-04-24 RX ORDER — ONDANSETRON 8 MG/1
4 TABLET, FILM COATED ORAL ONCE
Qty: 0 | Refills: 0 | Status: COMPLETED | OUTPATIENT
Start: 2018-04-24 | End: 2018-04-24

## 2018-04-24 RX ORDER — SODIUM CHLORIDE 9 MG/ML
1000 INJECTION INTRAMUSCULAR; INTRAVENOUS; SUBCUTANEOUS ONCE
Qty: 0 | Refills: 0 | Status: COMPLETED | OUTPATIENT
Start: 2018-04-24 | End: 2018-04-24

## 2018-04-24 RX ORDER — FAMOTIDINE 10 MG/ML
20 INJECTION INTRAVENOUS ONCE
Qty: 0 | Refills: 0 | Status: COMPLETED | OUTPATIENT
Start: 2018-04-24 | End: 2018-04-24

## 2018-04-24 RX ADMIN — FAMOTIDINE 20 MILLIGRAM(S): 10 INJECTION INTRAVENOUS at 20:44

## 2018-04-24 RX ADMIN — LIDOCAINE 10 MILLILITER(S): 4 CREAM TOPICAL at 20:45

## 2018-04-24 RX ADMIN — Medication 30 MILLILITER(S): at 20:45

## 2018-04-24 RX ADMIN — Medication 400 MILLIGRAM(S): at 20:45

## 2018-04-24 RX ADMIN — SODIUM CHLORIDE 1000 MILLILITER(S): 9 INJECTION INTRAMUSCULAR; INTRAVENOUS; SUBCUTANEOUS at 20:45

## 2018-04-24 RX ADMIN — ONDANSETRON 4 MILLIGRAM(S): 8 TABLET, FILM COATED ORAL at 20:44

## 2018-04-24 NOTE — ED PROVIDER NOTE - NS ED ROS FT
GENERAL: No fever or chills, //             EYES: no change in vision, //             HEENT: no trouble swallowing or speaking, //             CARDIAC: no chest pain, //              PULMONARY: no cough or SOB, //                 : No changes in urination,  //            SKIN: no rashes,  //            NEURO: mild headache,  //             MSK: No joint pain otherwise as HPI or negative. ~Ruby Fang M.D., Ph.D. -Resident

## 2018-04-24 NOTE — ED PROVIDER NOTE - OBJECTIVE STATEMENT
27 female otherwise healthy here for abdominal pain. Onset yesterday with epigastric pain, radiating to the back, worse with meals, associated with nausea/vomiting (nonbilious), diarrhea. No sick contacts. no recent travel, UTD with vaccinations.

## 2018-04-24 NOTE — ED PROVIDER NOTE - CARE PLAN
Principal Discharge DX:	Acute gastroenteritis  Assessment and plan of treatment:	You were seen in the ER for nausea, vomiting and diarrhea. You must follow up with your primary physician in 24 to 48 hours. Return to the ER for any new or worsening signs/symptoms.  1) Take zofran for nausea and vomiting as prescribed.   2) You may take 20mg of Pepcid twice per day for indigestion relief. This is an over the counter medication available at normal pharmacies.

## 2018-04-24 NOTE — ED PROVIDER NOTE - MEDICAL DECISION MAKING DETAILS
RUQ and epigastric pain, may be acute gastroenteritis but may also be biliary colic. Will do basic labs, UA, RUQ sono, analgesia, antiemetics, dispo pending workup. RUQ and epigastric pain, may be acute gastroenteritis but may also be biliary colic. Will do basic labs, UA, RUQ sono, analgesia, antiemetics, dispo pending workup.  MD Ileana,Attending: pt seen and examined, agree with above HPI/ROS/PE. Tender epigastrium and right upper quadrant. gastritis vs PUD vs GERD/esophagitis vs hepatobiliary dz(biliary colic) vs AGE. For bloods/RUQ US/UA RUQ and epigastric pain, may be acute gastroenteritis but may also be biliary colic. Will do basic labs, UA, RUQ sono, analgesia, antiemetics, dispo pending workup.  MD Ileana,Attending: pt seen and examined, agree with above HPI/ROS/PE. Tender epigastrium and right upper quadrant. gastritis vs PUD vs GERD/esophagitis vs hepatobiliary dz(biliary colic) vs AGE. For bloods/RUQ US/UA. For IVFs/ondansetron/GI cocktail--if signifcantly improved may forego imaging

## 2018-04-24 NOTE — ED PROVIDER NOTE - PHYSICAL EXAMINATION
Gen: NAD, AOx3, non-toxic //            Head: NCAT //            HEENT: EOMI, oral mucosa moist, normal conjunctiva //            Lung: CTAB, no respiratory distress, no wheezes/rhonchi/rales B/L, speaking in full sentences. //            CV: RRR, no murmurs, rubs or gallops //            Abd: soft, epigastric and RUQ tenderness, +arceo's sign, left CVA tenderness //            MSK: no visible deformities //            Neuro: No focal sensory or motor deficits //            Skin: Warm, well perfused, no rash //            Psych: normal affect. ~Ruby Fang M.D., Ph.D. -Resident

## 2018-04-24 NOTE — ED PROVIDER NOTE - PROGRESS NOTE DETAILS
- MD Annalisa,PhD - Resident: patient's labs not remarkable, no acute ami on US, will d/c home with zofran and close pcp Follow up.

## 2018-04-24 NOTE — ED PROVIDER NOTE - PLAN OF CARE
You were seen in the ER for nausea, vomiting and diarrhea. You must follow up with your primary physician in 24 to 48 hours. Return to the ER for any new or worsening signs/symptoms.  1) Take zofran for nausea and vomiting as prescribed.   2) You may take 20mg of Pepcid twice per day for indigestion relief. This is an over the counter medication available at normal pharmacies.

## 2018-04-24 NOTE — ED ADULT NURSE NOTE - OBJECTIVE STATEMENT
27 yr old female arrived to ED from home c/o abd pain, nausea and vomiting. per pt pain and other symptoms started yesterday. upon assessment pt is a&ox3. neuro grossly intact, lungs clear jeanne, abd non distended, tender to palpation in epigastric area. skin WDL, no edema noted. pt denies urinary or respiratory symptoms.  and daughter at bedside

## 2018-04-25 VITALS
TEMPERATURE: 99 F | SYSTOLIC BLOOD PRESSURE: 92 MMHG | DIASTOLIC BLOOD PRESSURE: 59 MMHG | RESPIRATION RATE: 17 BRPM | HEART RATE: 63 BPM | OXYGEN SATURATION: 100 %

## 2018-04-25 RX ORDER — FAMOTIDINE 10 MG/ML
1 INJECTION INTRAVENOUS
Qty: 7 | Refills: 0 | OUTPATIENT
Start: 2018-04-25 | End: 2018-05-01

## 2018-04-25 RX ORDER — ONDANSETRON 8 MG/1
1 TABLET, FILM COATED ORAL
Qty: 20 | Refills: 0 | OUTPATIENT
Start: 2018-04-25 | End: 2018-04-29

## 2018-05-01 ENCOUNTER — OUTPATIENT (OUTPATIENT)
Dept: OUTPATIENT SERVICES | Facility: HOSPITAL | Age: 28
LOS: 1 days | End: 2018-05-01
Payer: SELF-PAY

## 2018-05-01 ENCOUNTER — APPOINTMENT (OUTPATIENT)
Dept: INTERNAL MEDICINE | Facility: CLINIC | Age: 28
End: 2018-05-01

## 2018-05-01 VITALS
OXYGEN SATURATION: 98 % | BODY MASS INDEX: 25.27 KG/M2 | WEIGHT: 148 LBS | HEIGHT: 64 IN | DIASTOLIC BLOOD PRESSURE: 70 MMHG | HEART RATE: 66 BPM | SYSTOLIC BLOOD PRESSURE: 122 MMHG

## 2018-05-01 DIAGNOSIS — B35.1 TINEA UNGUIUM: ICD-10-CM

## 2018-05-01 DIAGNOSIS — H10.10 ACUTE ATOPIC CONJUNCTIVITIS, UNSPECIFIED EYE: ICD-10-CM

## 2018-05-01 DIAGNOSIS — M20.12 HALLUX VALGUS (ACQUIRED), LEFT FOOT: ICD-10-CM

## 2018-05-01 DIAGNOSIS — N63.0 UNSPECIFIED LUMP IN UNSPECIFIED BREAST: ICD-10-CM

## 2018-05-01 DIAGNOSIS — I10 ESSENTIAL (PRIMARY) HYPERTENSION: ICD-10-CM

## 2018-05-01 DIAGNOSIS — M21.622 BUNIONETTE OF LEFT FOOT: ICD-10-CM

## 2018-05-01 DIAGNOSIS — M20.40 OTHER HAMMER TOE(S) (ACQUIRED), UNSPECIFIED FOOT: ICD-10-CM

## 2018-05-01 DIAGNOSIS — O00.90 UNSPECIFIED ECTOPIC PREGNANCY WITHOUT INTRAUTERINE PREGNANCY: Chronic | ICD-10-CM

## 2018-05-01 DIAGNOSIS — Z30.430 ENCOUNTER FOR INSERTION OF INTRAUTERINE CONTRACEPTIVE DEVICE: ICD-10-CM

## 2018-05-01 PROCEDURE — 90471 IMMUNIZATION ADMIN: CPT

## 2018-05-01 PROCEDURE — 90649 4VHPV VACCINE 3 DOSE IM: CPT

## 2018-05-01 PROCEDURE — G0463: CPT

## 2018-05-09 DIAGNOSIS — H10.10 ACUTE ATOPIC CONJUNCTIVITIS, UNSPECIFIED EYE: ICD-10-CM

## 2018-05-09 DIAGNOSIS — Z30.430 ENCOUNTER FOR INSERTION OF INTRAUTERINE CONTRACEPTIVE DEVICE: ICD-10-CM

## 2018-05-09 DIAGNOSIS — Z86.19 PERSONAL HISTORY OF OTHER INFECTIOUS AND PARASITIC DISEASES: ICD-10-CM

## 2018-05-09 DIAGNOSIS — Z23 ENCOUNTER FOR IMMUNIZATION: ICD-10-CM

## 2018-05-09 DIAGNOSIS — N63.0 UNSPECIFIED LUMP IN UNSPECIFIED BREAST: ICD-10-CM

## 2018-06-15 ENCOUNTER — OUTPATIENT (OUTPATIENT)
Dept: OUTPATIENT SERVICES | Facility: HOSPITAL | Age: 28
LOS: 1 days | End: 2018-06-15
Payer: SELF-PAY

## 2018-06-15 ENCOUNTER — APPOINTMENT (OUTPATIENT)
Dept: INTERNAL MEDICINE | Facility: CLINIC | Age: 28
End: 2018-06-15

## 2018-06-15 VITALS
HEART RATE: 56 BPM | WEIGHT: 144 LBS | BODY MASS INDEX: 24.72 KG/M2 | DIASTOLIC BLOOD PRESSURE: 69 MMHG | OXYGEN SATURATION: 99 % | SYSTOLIC BLOOD PRESSURE: 120 MMHG

## 2018-06-15 DIAGNOSIS — Z30.430 ENCOUNTER FOR INSERTION OF INTRAUTERINE CONTRACEPTIVE DEVICE: ICD-10-CM

## 2018-06-15 DIAGNOSIS — Z23 ENCOUNTER FOR IMMUNIZATION: ICD-10-CM

## 2018-06-15 DIAGNOSIS — M79.672 PAIN IN LEFT FOOT: ICD-10-CM

## 2018-06-15 DIAGNOSIS — I10 ESSENTIAL (PRIMARY) HYPERTENSION: ICD-10-CM

## 2018-06-15 DIAGNOSIS — N63.0 UNSPECIFIED LUMP IN UNSPECIFIED BREAST: ICD-10-CM

## 2018-06-15 DIAGNOSIS — N64.4 MASTODYNIA: ICD-10-CM

## 2018-06-15 DIAGNOSIS — O00.90 UNSPECIFIED ECTOPIC PREGNANCY WITHOUT INTRAUTERINE PREGNANCY: Chronic | ICD-10-CM

## 2018-06-15 DIAGNOSIS — Z86.19 PERSONAL HISTORY OF OTHER INFECTIOUS AND PARASITIC DISEASES: ICD-10-CM

## 2018-06-15 PROCEDURE — G0463: CPT

## 2018-06-18 NOTE — PHYSICAL EXAM
[No Acute Distress] : no acute distress [Well Nourished] : well nourished [Normal Sclera/Conjunctiva] : normal sclera/conjunctiva [Normal Outer Ear/Nose] : the outer ears and nose were normal in appearance [No JVD] : no jugular venous distention [No Respiratory Distress] : no respiratory distress  [Clear to Auscultation] : lungs were clear to auscultation bilaterally [No Accessory Muscle Use] : no accessory muscle use [Normal Rate] : normal rate  [Regular Rhythm] : with a regular rhythm [Normal S1, S2] : normal S1 and S2 [Normal Appearance] : normal in appearance [No Nipple Discharge] : no nipple discharge [No Axillary Lymphadenopathy] : no axillary lymphadenopathy [Soft] : abdomen soft [Non Tender] : non-tender [No HSM] : no HSM [Normal Bowel Sounds] : normal bowel sounds [de-identified] : 1cm mobile cyst like collection in the  6'clock position

## 2018-06-18 NOTE — HISTORY OF PRESENT ILLNESS
[de-identified] : : [id number]\par \par 28 y/o F w/ a pmh significant for Onychomycosis s/p L hallux avulsion, ectopic pregnancy presenting for follow up visit. Pt was seen in clinic last month with a chief complaint of eye puffiness-presumed allergic conjunctivitis and recommended OTC Visine eye gtt w/ moderate improvement in symptoms. Today pt is reporting a 2 week hx of R breast pain. She descries the pain as dull, constant, non radiating localized mainly under her breast. She has not tried taking anything to alleviate her pain. She denies any discharge or blood from the nipple.  She performs daily breast exams and has noticed a 'tiny lump' beneath her R breast. Of note, pt had US breast 2017 which was wnl. Pt reports family hx significant for breast cancer in her two aunts. Pt denies any fevers,chills, n/v, CP, palpitations, SOB, abdominal pain, dysuria, melena or hematochezia.

## 2018-06-18 NOTE — ASSESSMENT
[FreeTextEntry1] : 27 F onychomycosis s/p L hallux avulsion, ectopic pregnancy presenting for follow up visit for R breast pain\par \par \par #R Breast Pain\par - Palpable R breast cyst like collection on physical exam. Collection is tender to palpation concerning for abscess vs. cyst vs. adenoma. No hx of discharge or inverted nipple in setting of + Family history. Will repeat Breast US complete\par - Pt advised to use hot compresses, Tylenol, and avoid tight clothing/bras \par - U/S breast negative 2017\par \par #HCM\par - Last PAP smear Jan 2017 \par - UTD with Flu and Tdap \par - HPV Vaccination series started last month. HPV 2/3 today- last vaccination in 4 months \par \par RTC in 4 months for HPV vaccination\par \par Case d/w Dr. Huerta\par

## 2018-06-21 DIAGNOSIS — N64.4 MASTODYNIA: ICD-10-CM

## 2018-07-16 ENCOUNTER — FORM ENCOUNTER (OUTPATIENT)
Age: 28
End: 2018-07-16

## 2018-07-17 ENCOUNTER — APPOINTMENT (OUTPATIENT)
Dept: ULTRASOUND IMAGING | Facility: IMAGING CENTER | Age: 28
End: 2018-07-17
Payer: COMMERCIAL

## 2018-07-17 ENCOUNTER — OUTPATIENT (OUTPATIENT)
Dept: OUTPATIENT SERVICES | Facility: HOSPITAL | Age: 28
LOS: 1 days | End: 2018-07-17
Payer: SELF-PAY

## 2018-07-17 DIAGNOSIS — O00.90 UNSPECIFIED ECTOPIC PREGNANCY WITHOUT INTRAUTERINE PREGNANCY: Chronic | ICD-10-CM

## 2018-07-17 DIAGNOSIS — N64.4 MASTODYNIA: ICD-10-CM

## 2018-07-17 PROCEDURE — 76642 ULTRASOUND BREAST LIMITED: CPT | Mod: 26,RT

## 2018-07-17 PROCEDURE — 76642 ULTRASOUND BREAST LIMITED: CPT

## 2018-07-18 DIAGNOSIS — Z86.19 PERSONAL HISTORY OF OTHER INFECTIOUS AND PARASITIC DISEASES: ICD-10-CM

## 2018-07-18 DIAGNOSIS — Z23 ENCOUNTER FOR IMMUNIZATION: ICD-10-CM

## 2018-07-18 DIAGNOSIS — N63.0 UNSPECIFIED LUMP IN UNSPECIFIED BREAST: ICD-10-CM

## 2018-07-18 DIAGNOSIS — M79.671 PAIN IN RIGHT FOOT: ICD-10-CM

## 2018-07-18 DIAGNOSIS — M79.672 PAIN IN LEFT FOOT: ICD-10-CM

## 2018-07-20 ENCOUNTER — APPOINTMENT (OUTPATIENT)
Dept: PODIATRY | Facility: HOSPITAL | Age: 28
End: 2018-07-20

## 2018-07-25 NOTE — ED ADULT NURSE NOTE - TEMPLATE LIST FOR HEAD TO TOE ASSESSMENT
Please return immediately if you get worse or if new problems develop.  Please follow-up with your general surgeon this week.  Please follow-up with your gastroenterologist as soon as possible.  Please take the pantoprazole twice a day.  Please do not use caffeine carbonation alcohol cigarette citrus tomato ibuprofen Naprosyn aspirin.  Phenergan or Zofran for nausea.  Please follow-up with her general surgeon for repair of her left inguinal hernia.  Rest.   Abdominal Pain, N/V/D

## 2018-08-06 NOTE — END OF VISIT
Patient called to give an update on the medication she took over the weekend.  Please call her at HOME: [phone].        [] : Resident

## 2018-09-10 ENCOUNTER — NON-APPOINTMENT (OUTPATIENT)
Age: 28
End: 2018-09-10

## 2018-09-10 ENCOUNTER — OUTPATIENT (OUTPATIENT)
Dept: OUTPATIENT SERVICES | Facility: HOSPITAL | Age: 28
LOS: 1 days | End: 2018-09-10
Payer: SELF-PAY

## 2018-09-10 ENCOUNTER — EMERGENCY (EMERGENCY)
Facility: HOSPITAL | Age: 28
LOS: 1 days | Discharge: HOME | End: 2018-09-10
Attending: EMERGENCY MEDICINE
Payer: MEDICAID

## 2018-09-10 ENCOUNTER — APPOINTMENT (OUTPATIENT)
Dept: INTERNAL MEDICINE | Facility: CLINIC | Age: 28
End: 2018-09-10

## 2018-09-10 VITALS
DIASTOLIC BLOOD PRESSURE: 65 MMHG | HEART RATE: 55 BPM | RESPIRATION RATE: 16 BRPM | OXYGEN SATURATION: 100 % | TEMPERATURE: 98 F | SYSTOLIC BLOOD PRESSURE: 102 MMHG

## 2018-09-10 VITALS
SYSTOLIC BLOOD PRESSURE: 110 MMHG | DIASTOLIC BLOOD PRESSURE: 70 MMHG | BODY MASS INDEX: 24.41 KG/M2 | HEIGHT: 64 IN | WEIGHT: 143 LBS

## 2018-09-10 VITALS
WEIGHT: 141.98 LBS | HEIGHT: 64.17 IN | TEMPERATURE: 98 F | DIASTOLIC BLOOD PRESSURE: 67 MMHG | RESPIRATION RATE: 18 BRPM | SYSTOLIC BLOOD PRESSURE: 104 MMHG | HEART RATE: 63 BPM | OXYGEN SATURATION: 98 %

## 2018-09-10 DIAGNOSIS — O00.90 UNSPECIFIED ECTOPIC PREGNANCY WITHOUT INTRAUTERINE PREGNANCY: Chronic | ICD-10-CM

## 2018-09-10 DIAGNOSIS — Z86.19 PERSONAL HISTORY OF OTHER INFECTIOUS AND PARASITIC DISEASES: ICD-10-CM

## 2018-09-10 DIAGNOSIS — N63.0 UNSPECIFIED LUMP IN UNSPECIFIED BREAST: ICD-10-CM

## 2018-09-10 DIAGNOSIS — I10 ESSENTIAL (PRIMARY) HYPERTENSION: ICD-10-CM

## 2018-09-10 DIAGNOSIS — R30.0 DYSURIA: ICD-10-CM

## 2018-09-10 DIAGNOSIS — Z23 ENCOUNTER FOR IMMUNIZATION: ICD-10-CM

## 2018-09-10 DIAGNOSIS — R10.816 EPIGASTRIC ABDOMINAL TENDERNESS: ICD-10-CM

## 2018-09-10 LAB
ALBUMIN SERPL ELPH-MCNC: 3.9 G/DL — SIGNIFICANT CHANGE UP (ref 3.3–5)
ALP SERPL-CCNC: 57 U/L — SIGNIFICANT CHANGE UP (ref 40–120)
ALT FLD-CCNC: 9 U/L — LOW (ref 10–45)
ANION GAP SERPL CALC-SCNC: 11 MMOL/L — SIGNIFICANT CHANGE UP (ref 5–17)
APPEARANCE UR: CLEAR — SIGNIFICANT CHANGE UP
AST SERPL-CCNC: 16 U/L — SIGNIFICANT CHANGE UP (ref 10–40)
BACTERIA # UR AUTO: ABNORMAL
BASOPHILS # BLD AUTO: 0 K/UL — SIGNIFICANT CHANGE UP (ref 0–0.2)
BASOPHILS NFR BLD AUTO: 0.4 % — SIGNIFICANT CHANGE UP (ref 0–2)
BILIRUB SERPL-MCNC: 0.3 MG/DL — SIGNIFICANT CHANGE UP (ref 0.2–1.2)
BILIRUB UR-MCNC: NEGATIVE — SIGNIFICANT CHANGE UP
BUN SERPL-MCNC: 13 MG/DL — SIGNIFICANT CHANGE UP (ref 7–23)
CALCIUM SERPL-MCNC: 8.3 MG/DL — LOW (ref 8.4–10.5)
CARD LOT #: 361
CARD LOT EXP DATE: NORMAL
CHLORIDE SERPL-SCNC: 107 MMOL/L — SIGNIFICANT CHANGE UP (ref 96–108)
CO2 SERPL-SCNC: 23 MMOL/L — SIGNIFICANT CHANGE UP (ref 22–31)
COLOR SPEC: SIGNIFICANT CHANGE UP
CREAT SERPL-MCNC: 0.79 MG/DL — SIGNIFICANT CHANGE UP (ref 0.5–1.3)
DATE COLLECTED: NORMAL
DEVELOPER LOT #: NORMAL
DEVELOPER LOT EXP DATE: NORMAL
DIFF PNL FLD: NEGATIVE — SIGNIFICANT CHANGE UP
EOSINOPHIL # BLD AUTO: 0.1 K/UL — SIGNIFICANT CHANGE UP (ref 0–0.5)
EOSINOPHIL NFR BLD AUTO: 2.6 % — SIGNIFICANT CHANGE UP (ref 0–6)
EPI CELLS # UR: 6 /HPF — HIGH
GLUCOSE SERPL-MCNC: 95 MG/DL — SIGNIFICANT CHANGE UP (ref 70–99)
GLUCOSE UR QL: NEGATIVE — SIGNIFICANT CHANGE UP
HCG SERPL-ACNC: <2 MIU/ML — SIGNIFICANT CHANGE UP
HCT VFR BLD CALC: 35 % — SIGNIFICANT CHANGE UP (ref 34.5–45)
HEMOCCULT SP1 STL QL: POSITIVE
HGB BLD-MCNC: 12 G/DL — SIGNIFICANT CHANGE UP (ref 11.5–15.5)
HYALINE CASTS # UR AUTO: 0 /LPF — SIGNIFICANT CHANGE UP (ref 0–2)
KETONES UR-MCNC: NEGATIVE — SIGNIFICANT CHANGE UP
LEUKOCYTE ESTERASE UR-ACNC: NEGATIVE — SIGNIFICANT CHANGE UP
LYMPHOCYTES # BLD AUTO: 2 K/UL — SIGNIFICANT CHANGE UP (ref 1–3.3)
LYMPHOCYTES # BLD AUTO: 41.9 % — SIGNIFICANT CHANGE UP (ref 13–44)
MCHC RBC-ENTMCNC: 31.9 PG — SIGNIFICANT CHANGE UP (ref 27–34)
MCHC RBC-ENTMCNC: 34.3 GM/DL — SIGNIFICANT CHANGE UP (ref 32–36)
MCV RBC AUTO: 93.1 FL — SIGNIFICANT CHANGE UP (ref 80–100)
MONOCYTES # BLD AUTO: 0.3 K/UL — SIGNIFICANT CHANGE UP (ref 0–0.9)
MONOCYTES NFR BLD AUTO: 5.1 % — SIGNIFICANT CHANGE UP (ref 2–14)
NEUTROPHILS # BLD AUTO: 2.4 K/UL — SIGNIFICANT CHANGE UP (ref 1.8–7.4)
NEUTROPHILS NFR BLD AUTO: 49.9 % — SIGNIFICANT CHANGE UP (ref 43–77)
NITRITE UR-MCNC: NEGATIVE — SIGNIFICANT CHANGE UP
PH UR: 7.5 — SIGNIFICANT CHANGE UP (ref 5–8)
PLATELET # BLD AUTO: 154 K/UL — SIGNIFICANT CHANGE UP (ref 150–400)
POTASSIUM SERPL-MCNC: 4.2 MMOL/L — SIGNIFICANT CHANGE UP (ref 3.5–5.3)
POTASSIUM SERPL-SCNC: 4.2 MMOL/L — SIGNIFICANT CHANGE UP (ref 3.5–5.3)
PROT SERPL-MCNC: 6.8 G/DL — SIGNIFICANT CHANGE UP (ref 6–8.3)
PROT UR-MCNC: ABNORMAL
QUALITY CONTROL: YES
RBC # BLD: 3.76 M/UL — LOW (ref 3.8–5.2)
RBC # FLD: 12.7 % — SIGNIFICANT CHANGE UP (ref 10.3–14.5)
RBC CASTS # UR COMP ASSIST: 2 /HPF — SIGNIFICANT CHANGE UP (ref 0–4)
SODIUM SERPL-SCNC: 141 MMOL/L — SIGNIFICANT CHANGE UP (ref 135–145)
SP GR SPEC: 1.03 — HIGH (ref 1.01–1.02)
UROBILINOGEN FLD QL: NEGATIVE — SIGNIFICANT CHANGE UP
WBC # BLD: 4.9 K/UL — SIGNIFICANT CHANGE UP (ref 3.8–10.5)
WBC # FLD AUTO: 4.9 K/UL — SIGNIFICANT CHANGE UP (ref 3.8–10.5)
WBC UR QL: 2 /HPF — SIGNIFICANT CHANGE UP (ref 0–5)

## 2018-09-10 PROCEDURE — 80053 COMPREHEN METABOLIC PANEL: CPT

## 2018-09-10 PROCEDURE — 81001 URINALYSIS AUTO W/SCOPE: CPT

## 2018-09-10 PROCEDURE — 85027 COMPLETE CBC AUTOMATED: CPT

## 2018-09-10 PROCEDURE — 83690 ASSAY OF LIPASE: CPT

## 2018-09-10 PROCEDURE — 76705 ECHO EXAM OF ABDOMEN: CPT

## 2018-09-10 PROCEDURE — 76705 ECHO EXAM OF ABDOMEN: CPT | Mod: 26,RT

## 2018-09-10 PROCEDURE — 84702 CHORIONIC GONADOTROPIN TEST: CPT

## 2018-09-10 PROCEDURE — 99284 EMERGENCY DEPT VISIT MOD MDM: CPT

## 2018-09-10 PROCEDURE — G0463: CPT

## 2018-09-10 RX ORDER — SODIUM CHLORIDE 9 MG/ML
1000 INJECTION INTRAMUSCULAR; INTRAVENOUS; SUBCUTANEOUS ONCE
Qty: 0 | Refills: 0 | Status: COMPLETED | OUTPATIENT
Start: 2018-09-10 | End: 2018-09-10

## 2018-09-10 RX ORDER — OMEPRAZOLE 10 MG/1
1 CAPSULE, DELAYED RELEASE ORAL
Qty: 14 | Refills: 0 | OUTPATIENT
Start: 2018-09-10 | End: 2018-09-23

## 2018-09-10 RX ADMIN — Medication 30 MILLILITER(S): at 18:00

## 2018-09-10 RX ADMIN — SODIUM CHLORIDE 1000 MILLILITER(S): 9 INJECTION INTRAMUSCULAR; INTRAVENOUS; SUBCUTANEOUS at 20:14

## 2018-09-10 NOTE — ED PROVIDER NOTE - CONDUCTED A DETAILED DISCUSSION WITH PATIENT AND/OR GUARDIAN REGARDING, MDM
return to ED if symptoms worsen, persist or questions arise/radiology results/need for outpatient follow-up/lab results lab results/return to ED if symptoms worsen, persist or questions arise/radiology results/need for outpatient follow-up/DNR

## 2018-09-10 NOTE — ED ADULT TRIAGE NOTE - CHIEF COMPLAINT QUOTE
Went to Urgent care center for dizziness for a month, and abdominal pain for 2 weeks .  states that they were send here for low BP Went to Urgent care center for dizziness for a month, and abdominal pain for 2 weeks, orthostatic BP was low, was advised to go to ER.

## 2018-09-10 NOTE — ED ADULT NURSE NOTE - OBJECTIVE STATEMENT
27 y/o F, , 5 year old daughter at home, PSH R ectopic , presents ambulatory to ED c/o 2 weeks of dizziness, lightheadedness, epigastric/RUQ pain worse after eating, nausea, no vomiting. Pt went to Urgent Care today, sent to go for labs/US but her BP was low (she does not know what it was) so she was advised to come to ED. Pt denies headache, chest pain, palpitations, cough, SOB, diarrhea, urinary symptoms, fevers, chills, weakness at this time. Safety maintained.

## 2018-09-10 NOTE — ED ADULT NURSE NOTE - CHIEF COMPLAINT QUOTE
Went to Urgent care center for dizziness for a month, and abdominal pain for 2 weeks, orthostatic BP was low, was advised to go to ER.

## 2018-09-10 NOTE — ED PROVIDER NOTE - PLAN OF CARE
Discussed lab and radiology findings with pt. Pt feels well, comfortable going home. Gave home care and follow up instructions. Discussed which symptoms to look out for and when to return to the ED for further evaluation. Pt given opportunity to ask questions about their medical condition and had all questions answered.

## 2018-09-10 NOTE — ED PROVIDER NOTE - CARE PLAN
Principal Discharge DX:	Other acute gastritis without hemorrhage Principal Discharge DX:	Other acute gastritis without hemorrhage  Assessment and plan of treatment:	Discussed lab and radiology findings with pt. Pt feels well, comfortable going home. Gave home care and follow up instructions. Discussed which symptoms to look out for and when to return to the ED for further evaluation. Pt given opportunity to ask questions about their medical condition and had all questions answered.

## 2018-09-10 NOTE — ED PROVIDER NOTE - OBJECTIVE STATEMENT
Went to [facility] and had labs  PMHx/Meds/All none  [id number] 1. No STD hx. 18 year old with lightheadedness and epigastric abdominal pain--radiates to right upper quadrant and worse with food- -intermittent . nausea no vomiting . worse in same area with deep inspiration. No fever/chills. resolved diarrhea last week.   Went to [facility] and had labs  PMHx/Meds/All none  [id number] 1. No STD hx.

## 2018-09-10 NOTE — ED CLERICAL - NS ED CLERK NOTE PRE-ARRIVAL INFORMATION; ADDITIONAL PRE-ARRIVAL INFORMATION
Stomach pain 2 weeks possible ulcer workup suspicion upper GI bleed dark stool orthostatic hypotension Stomach pain 2 weeks; possible ulcer; workup suspicion; upper GI bleed; dark stool; orthostatic hypotension; blood pressure 98/60 lying down 72/52.

## 2018-09-10 NOTE — ED PROVIDER NOTE - PROGRESS NOTE DETAILS
MD Ileana,Attending: US no evidence of cholecystitis and labs unremarkable for concerning cause of sxs. DC on PPis

## 2018-09-10 NOTE — ED ADULT NURSE NOTE - NSIMPLEMENTINTERV_GEN_ALL_ED
Implemented All Universal Safety Interventions:  [place] to call system. Call bell, personal items and telephone within reach. Instruct patient to call for assistance. Room bathroom lighting operational. Non-slip footwear when patient is off stretcher. Physically safe environment: no spills, clutter or unnecessary equipment. Stretcher in lowest position, wheels locked, appropriate side rails in place.

## 2018-09-12 NOTE — DISCUSSION/SUMMARY
[Home] : patient was discharged to home [FreeTextEntry1] : Pt treated for presumed non-bleeding gastritis. Labs were WNL and RUQ US WNL. Started on omeprazole 40 mg daily upon discharge from ED.  [FreeTextEntry3] : I called the patient, advised her to make a followup appointment. She feels better in terms of epigastric pain and lightheadedness however now she feels that she has a genitourinary infection so she will make a followup appointment.

## 2018-10-03 NOTE — REVIEW OF SYSTEMS
[Abdominal Pain] : abdominal pain [Melena] : melmiriam [Dysuria] : dysuria [Dizziness] : dizziness [Fever] : no fever [Chills] : no chills [Discharge] : no discharge [Pain] : no pain [Hearing Loss] : no hearing loss [Chest Pain] : no chest pain [Palpitations] : no palpitations [Shortness Of Breath] : no shortness of breath [Wheezing] : no wheezing [Nausea] : no nausea [Joint Pain] : no joint pain [Itching] : no itching [Headache] : no headache [Suicidal] : not suicidal

## 2018-10-03 NOTE — ASSESSMENT
[FreeTextEntry1] : 27 yo woman with PMH significant for onychomycosis s/p L hallox avulsion, and ectopic pregnancy 18 months prior p/w abdominal pain concern for UGIB\par \par #abdominal pain\par - 2 week history of mid-epigastric pain exacerbated by eating with melena\par - guiac positive, no blood on glove on in rectal vault\par - initially ordered CBC, CMP, iron studies, amylase/lipase, h pylori\par - abdominal US ordered\par - pt was positive on orthostatics for complaints of dizziness. Was positive lying down 98/60 -> 72/52 standing. Referred to ED for IVF and UGIB w/u. [facility] ED called and notified. \par \par #Dysuria\par - pt complaining of burning on urination, UA ordered with urine culture\par \par #Toe pain\par - pt having toe pain after hallox avulsion, provided with podiatry referral.\par \par

## 2018-10-03 NOTE — PHYSICAL EXAM
[No Acute Distress] : no acute distress [No JVD] : no jugular venous distention [No Respiratory Distress] : no respiratory distress  [Clear to Auscultation] : lungs were clear to auscultation bilaterally [No Accessory Muscle Use] : no accessory muscle use [Normal Rate] : normal rate  [Regular Rhythm] : with a regular rhythm [Normal S1, S2] : normal S1 and S2 [Soft] : abdomen soft [Non-distended] : non-distended [Stool Occult Blood] : stool positive for occult blood [No CVA Tenderness] : no CVA  tenderness [de-identified] : TTP in RUQ [FreeTextEntry1] : no blood on glove

## 2018-10-31 NOTE — ED ADULT NURSE NOTE - NS ED NOTE  TALK SOMEONE YN
Preoperative H&P Update     The patient's History and Physical in the medical record   dated 10/31/18 was reviewed by me today. I reviewed the HPI, medications, allergies, reason for surgery, diagnosis and treatment plan and there has been no change. The patient was evaluated by me today. Physical exam findings for this update include:     Vitals:    10/31/18 0821   BP: 106/71   Pulse: 60   Resp: 20   Temp: 97.6 °F (36.4 °C)   SpO2: 97%      Airway is intact   Chest: breathing comfortably   Heart: regular rate and rhythm. Examination of the body region where surgery is to be performed shows skin is intact at the operative site.       Moy Lutz MD    Electronically signed by Freida Johnson MD on 10/31/2018 at 8:51 AM
no

## 2018-11-01 ENCOUNTER — RESULT CHARGE (OUTPATIENT)
Age: 28
End: 2018-11-01

## 2018-11-01 ENCOUNTER — LABORATORY RESULT (OUTPATIENT)
Age: 28
End: 2018-11-01

## 2018-11-01 ENCOUNTER — APPOINTMENT (OUTPATIENT)
Dept: OBGYN | Facility: CLINIC | Age: 28
End: 2018-11-01
Payer: SELF-PAY

## 2018-11-01 ENCOUNTER — OUTPATIENT (OUTPATIENT)
Dept: OUTPATIENT SERVICES | Facility: HOSPITAL | Age: 28
LOS: 1 days | End: 2018-11-01
Payer: SELF-PAY

## 2018-11-01 VITALS — WEIGHT: 138 LBS | BODY MASS INDEX: 23.69 KG/M2 | DIASTOLIC BLOOD PRESSURE: 60 MMHG | SYSTOLIC BLOOD PRESSURE: 110 MMHG

## 2018-11-01 DIAGNOSIS — O00.90 UNSPECIFIED ECTOPIC PREGNANCY WITHOUT INTRAUTERINE PREGNANCY: Chronic | ICD-10-CM

## 2018-11-01 DIAGNOSIS — B96.89 ACUTE VAGINITIS: ICD-10-CM

## 2018-11-01 DIAGNOSIS — N76.0 ACUTE VAGINITIS: ICD-10-CM

## 2018-11-01 PROCEDURE — 87491 CHLMYD TRACH DNA AMP PROBE: CPT

## 2018-11-01 PROCEDURE — G0463: CPT

## 2018-11-01 PROCEDURE — 87591 N.GONORRHOEAE DNA AMP PROB: CPT

## 2018-11-01 PROCEDURE — 87800 DETECT AGNT MULT DNA DIREC: CPT

## 2018-11-01 PROCEDURE — 99213 OFFICE O/P EST LOW 20 MIN: CPT | Mod: GC

## 2018-11-02 LAB
BILIRUB UR QL STRIP: NORMAL
C TRACH RRNA SPEC QL NAA+PROBE: SIGNIFICANT CHANGE UP
GLUCOSE UR-MCNC: NORMAL
HCG UR QL: 0.2 EU/DL
HGB UR QL STRIP.AUTO: NORMAL
KETONES UR-MCNC: NORMAL
LEUKOCYTE ESTERASE UR QL STRIP: NORMAL
N GONORRHOEA RRNA SPEC QL NAA+PROBE: SIGNIFICANT CHANGE UP
NITRITE UR QL STRIP: NORMAL
PH UR STRIP: 5.5
PROT UR STRIP-MCNC: NORMAL
SP GR UR STRIP: 1
SPECIMEN SOURCE: SIGNIFICANT CHANGE UP

## 2018-11-03 LAB
CANDIDA AB TITR SER: SIGNIFICANT CHANGE UP
G VAGINALIS DNA SPEC QL NAA+PROBE: DETECTED
T VAGINALIS SPEC QL WET PREP: SIGNIFICANT CHANGE UP

## 2018-11-05 ENCOUNTER — CLINICAL ADVICE (OUTPATIENT)
Age: 28
End: 2018-11-05

## 2018-11-05 RX ORDER — METRONIDAZOLE 500 MG/1
500 TABLET ORAL TWICE DAILY
Qty: 14 | Refills: 0 | Status: DISCONTINUED | COMMUNITY
Start: 2018-11-01 | End: 2018-11-05

## 2018-11-09 ENCOUNTER — OUTPATIENT (OUTPATIENT)
Dept: OUTPATIENT SERVICES | Facility: HOSPITAL | Age: 28
LOS: 1 days | End: 2018-11-09
Payer: SELF-PAY

## 2018-11-09 ENCOUNTER — APPOINTMENT (OUTPATIENT)
Dept: PODIATRY | Facility: HOSPITAL | Age: 28
End: 2018-11-09

## 2018-11-09 VITALS — DIASTOLIC BLOOD PRESSURE: 71 MMHG | SYSTOLIC BLOOD PRESSURE: 98 MMHG | HEIGHT: 64 IN | HEART RATE: 71 BPM

## 2018-11-09 DIAGNOSIS — M79.609 PAIN IN UNSPECIFIED LIMB: ICD-10-CM

## 2018-11-09 DIAGNOSIS — R30.0 DYSURIA: ICD-10-CM

## 2018-11-09 DIAGNOSIS — Z86.19 PERSONAL HISTORY OF OTHER INFECTIOUS AND PARASITIC DISEASES: ICD-10-CM

## 2018-11-09 DIAGNOSIS — N63.0 UNSPECIFIED LUMP IN UNSPECIFIED BREAST: ICD-10-CM

## 2018-11-09 DIAGNOSIS — M21.622 BUNIONETTE OF LEFT FOOT: ICD-10-CM

## 2018-11-09 DIAGNOSIS — Z23 ENCOUNTER FOR IMMUNIZATION: ICD-10-CM

## 2018-11-09 DIAGNOSIS — O00.90 UNSPECIFIED ECTOPIC PREGNANCY WITHOUT INTRAUTERINE PREGNANCY: Chronic | ICD-10-CM

## 2018-11-09 DIAGNOSIS — M20.12 HALLUX VALGUS (ACQUIRED), LEFT FOOT: ICD-10-CM

## 2018-11-09 PROCEDURE — 73630 X-RAY EXAM OF FOOT: CPT

## 2018-11-09 PROCEDURE — 73630 X-RAY EXAM OF FOOT: CPT | Mod: 26,LT

## 2018-11-09 PROCEDURE — G0463: CPT

## 2018-11-16 ENCOUNTER — APPOINTMENT (OUTPATIENT)
Dept: PODIATRY | Facility: HOSPITAL | Age: 28
End: 2018-11-16

## 2018-11-16 ENCOUNTER — OUTPATIENT (OUTPATIENT)
Dept: OUTPATIENT SERVICES | Facility: HOSPITAL | Age: 28
LOS: 1 days | End: 2018-11-16
Payer: SELF-PAY

## 2018-11-16 VITALS
HEART RATE: 72 BPM | HEIGHT: 64 IN | SYSTOLIC BLOOD PRESSURE: 115 MMHG | WEIGHT: 138 LBS | BODY MASS INDEX: 23.56 KG/M2 | DIASTOLIC BLOOD PRESSURE: 82 MMHG

## 2018-11-16 DIAGNOSIS — M20.11 HALLUX VALGUS (ACQUIRED), RIGHT FOOT: ICD-10-CM

## 2018-11-16 DIAGNOSIS — M20.12 HALLUX VALGUS (ACQUIRED), LEFT FOOT: ICD-10-CM

## 2018-11-16 DIAGNOSIS — M79.609 PAIN IN UNSPECIFIED LIMB: ICD-10-CM

## 2018-11-16 DIAGNOSIS — M21.622 BUNIONETTE OF LEFT FOOT: ICD-10-CM

## 2018-11-16 DIAGNOSIS — O00.90 UNSPECIFIED ECTOPIC PREGNANCY WITHOUT INTRAUTERINE PREGNANCY: Chronic | ICD-10-CM

## 2018-11-16 PROCEDURE — G0463: CPT

## 2018-11-20 ENCOUNTER — APPOINTMENT (OUTPATIENT)
Dept: INTERNAL MEDICINE | Facility: CLINIC | Age: 28
End: 2018-11-20

## 2018-11-20 ENCOUNTER — OUTPATIENT (OUTPATIENT)
Dept: OUTPATIENT SERVICES | Facility: HOSPITAL | Age: 28
LOS: 1 days | End: 2018-11-20
Payer: SELF-PAY

## 2018-11-20 VITALS — SYSTOLIC BLOOD PRESSURE: 108 MMHG | DIASTOLIC BLOOD PRESSURE: 64 MMHG | HEART RATE: 80 BPM

## 2018-11-20 DIAGNOSIS — O00.90 UNSPECIFIED ECTOPIC PREGNANCY WITHOUT INTRAUTERINE PREGNANCY: Chronic | ICD-10-CM

## 2018-11-20 DIAGNOSIS — M25.512 PAIN IN LEFT SHOULDER: ICD-10-CM

## 2018-11-20 DIAGNOSIS — M20.12 HALLUX VALGUS (ACQUIRED), LEFT FOOT: ICD-10-CM

## 2018-11-20 DIAGNOSIS — R51 HEADACHE: ICD-10-CM

## 2018-11-20 DIAGNOSIS — M20.11 HALLUX VALGUS (ACQUIRED), RIGHT FOOT: ICD-10-CM

## 2018-11-20 DIAGNOSIS — I10 ESSENTIAL (PRIMARY) HYPERTENSION: ICD-10-CM

## 2018-11-20 DIAGNOSIS — R10.816 EPIGASTRIC ABDOMINAL TENDERNESS: ICD-10-CM

## 2018-11-20 PROCEDURE — G0463: CPT

## 2018-11-20 PROCEDURE — 93005 ELECTROCARDIOGRAM TRACING: CPT

## 2018-11-21 PROBLEM — M25.512 LEFT SHOULDER PAIN: Status: ACTIVE | Noted: 2018-11-21

## 2018-11-21 LAB
BASOPHILS # BLD AUTO: 0.01 K/UL
BASOPHILS NFR BLD AUTO: 0.2 %
EOSINOPHIL # BLD AUTO: 0.06 K/UL
EOSINOPHIL NFR BLD AUTO: 1.3 %
HCT VFR BLD CALC: 38.1 %
HGB BLD-MCNC: 12.5 G/DL
IMM GRANULOCYTES NFR BLD AUTO: 0.2 %
LYMPHOCYTES # BLD AUTO: 1.92 K/UL
LYMPHOCYTES NFR BLD AUTO: 42.1 %
MAN DIFF?: NORMAL
MCHC RBC-ENTMCNC: 31.4 PG
MCHC RBC-ENTMCNC: 32.8 GM/DL
MCV RBC AUTO: 95.7 FL
MONOCYTES # BLD AUTO: 0.15 K/UL
MONOCYTES NFR BLD AUTO: 3.3 %
NEUTROPHILS # BLD AUTO: 2.41 K/UL
NEUTROPHILS NFR BLD AUTO: 52.9 %
PLATELET # BLD AUTO: 206 K/UL
RBC # BLD: 3.98 M/UL
RBC # FLD: 13.2 %
WBC # FLD AUTO: 4.56 K/UL

## 2018-11-21 NOTE — PHYSICAL EXAM
[No Acute Distress] : no acute distress [Well-Appearing] : well-appearing [Normal Voice/Communication] : normal voice/communication [Normal Sclera/Conjunctiva] : normal sclera/conjunctiva [PERRL] : pupils equal round and reactive to light [EOMI] : extraocular movements intact [Normal Outer Ear/Nose] : the outer ears and nose were normal in appearance [Normal Oropharynx] : the oropharynx was normal [No JVD] : no jugular venous distention [Supple] : supple [No Respiratory Distress] : no respiratory distress  [Clear to Auscultation] : lungs were clear to auscultation bilaterally [No Accessory Muscle Use] : no accessory muscle use [Normal Rate] : normal rate  [Regular Rhythm] : with a regular rhythm [Normal S1, S2] : normal S1 and S2 [No Carotid Bruits] : no carotid bruits [No Abdominal Bruit] : a ~M bruit was not heard ~T in the abdomen [No Edema] : there was no peripheral edema [No Extremity Clubbing/Cyanosis] : no extremity clubbing/cyanosis [Soft] : abdomen soft [Non Tender] : non-tender [Non-distended] : non-distended [No CVA Tenderness] : no CVA  tenderness [No Spinal Tenderness] : no spinal tenderness [No Joint Swelling] : no joint swelling [No Rash] : no rash [No Skin Lesions] : no skin lesions [Normal Gait] : normal gait [Coordination Grossly Intact] : coordination grossly intact [No Focal Deficits] : no focal deficits [Speech Grossly Normal] : speech grossly normal [Normal Mood] : the mood was normal [Normal Insight/Judgement] : insight and judgment were intact

## 2018-12-11 ENCOUNTER — OUTPATIENT (OUTPATIENT)
Dept: OUTPATIENT SERVICES | Facility: HOSPITAL | Age: 28
LOS: 1 days | End: 2018-12-11
Payer: SELF-PAY

## 2018-12-11 ENCOUNTER — APPOINTMENT (OUTPATIENT)
Dept: OBGYN | Facility: CLINIC | Age: 28
End: 2018-12-11
Payer: COMMERCIAL

## 2018-12-11 VITALS
WEIGHT: 137.38 LBS | DIASTOLIC BLOOD PRESSURE: 70 MMHG | BODY MASS INDEX: 23.58 KG/M2 | SYSTOLIC BLOOD PRESSURE: 100 MMHG

## 2018-12-11 DIAGNOSIS — Z01.419 ENCOUNTER FOR GYNECOLOGICAL EXAMINATION (GENERAL) (ROUTINE) W/OUT ABNORMAL FINDINGS: ICD-10-CM

## 2018-12-11 DIAGNOSIS — O00.90 UNSPECIFIED ECTOPIC PREGNANCY WITHOUT INTRAUTERINE PREGNANCY: Chronic | ICD-10-CM

## 2018-12-11 DIAGNOSIS — N76.0 ACUTE VAGINITIS: ICD-10-CM

## 2018-12-11 PROCEDURE — 99395 PREV VISIT EST AGE 18-39: CPT | Mod: GC

## 2018-12-11 PROCEDURE — G0463: CPT

## 2018-12-13 ENCOUNTER — FORM ENCOUNTER (OUTPATIENT)
Age: 28
End: 2018-12-13

## 2018-12-13 DIAGNOSIS — Z01.419 ENCOUNTER FOR GYNECOLOGICAL EXAMINATION (GENERAL) (ROUTINE) WITHOUT ABNORMAL FINDINGS: ICD-10-CM

## 2018-12-14 ENCOUNTER — APPOINTMENT (OUTPATIENT)
Dept: RADIOLOGY | Facility: CLINIC | Age: 28
End: 2018-12-14
Payer: COMMERCIAL

## 2018-12-14 ENCOUNTER — OUTPATIENT (OUTPATIENT)
Dept: OUTPATIENT SERVICES | Facility: HOSPITAL | Age: 28
LOS: 1 days | End: 2018-12-14
Payer: SELF-PAY

## 2018-12-14 DIAGNOSIS — R51 HEADACHE: ICD-10-CM

## 2018-12-14 DIAGNOSIS — O00.90 UNSPECIFIED ECTOPIC PREGNANCY WITHOUT INTRAUTERINE PREGNANCY: Chronic | ICD-10-CM

## 2018-12-14 DIAGNOSIS — Z01.419 ENCOUNTER FOR GYNECOLOGICAL EXAMINATION (GENERAL) (ROUTINE) WITHOUT ABNORMAL FINDINGS: ICD-10-CM

## 2018-12-14 PROCEDURE — 72040 X-RAY EXAM NECK SPINE 2-3 VW: CPT | Mod: 26

## 2018-12-14 PROCEDURE — 72040 X-RAY EXAM NECK SPINE 2-3 VW: CPT

## 2019-01-17 ENCOUNTER — APPOINTMENT (OUTPATIENT)
Dept: INTERNAL MEDICINE | Facility: CLINIC | Age: 29
End: 2019-01-17

## 2019-01-23 NOTE — REVIEW OF SYSTEMS
[Pain] : pain [Dryness] : dryness  [Headache] : headache [Dizziness] : dizziness [Fever] : no fever [Night Sweats] : no night sweats [Vision Problems] : no vision problems [Earache] : no earache [Hearing Loss] : no hearing loss [Nosebleed] : no nosebleeds [Postnasal Drip] : no postnasal drip [Chest Pain] : no chest pain [Leg Claudication] : no leg claudication [Lower Ext Edema] : no lower extremity edema [Shortness Of Breath] : no shortness of breath [Wheezing] : no wheezing [Cough] : no cough [Dyspnea on Exertion] : no dyspnea on exertion [Abdominal Pain] : no abdominal pain [Nausea] : no nausea [Vomiting] : no vomiting [Melena] : no melena [Dysuria] : no dysuria [Hematuria] : no hematuria [Frequency] : no frequency [Joint Pain] : no joint pain [Fainting] : no fainting [Memory Loss] : no memory loss [Unsteady Walking] : no ataxia [Insomnia] : no insomnia [Depression] : no depression [Easy Bleeding] : no easy bleeding [Easy Bruising] : no easy bruising

## 2019-01-23 NOTE — ASSESSMENT
[FreeTextEntry1] : 28F PMH allergic rhinitis, onychomycosis s/p L hallux avulsion, ectopic pregnancy, bacterial vaginosis (recently started on metronidazole), recently seen (9/2018) with complaint of epigastric pain, who presents today with complaints of headache and shoulder pain.\par \par # Gastritis: Symptoms improved with 1 week of PPI. Patient at risk of H. pylori and has not been on PPI recently. Stool guiaic was positive at last appointment. No overt bleeding but patient does have orthostasis and orthostatic symptoms\par - H. pylori stool test\par - PPI once H pylori test obtained.\par - Repeat CBC\par - Counseled to avoid NSAIDs\par - GI referral if blood loss\par \par # Headache: Suspect likely tension headache.\par - Discussed hot pack, stress-reduction techniques, stretching\par - Neurology referral offered as patient had previously been scheduled to see Neurologist\par - Avoid NSAIDs given gastritis\par - X-ray neck given combination of neck discomfort with headache and possible radiation from c-spine of pain/tingling down arm and hand (see below problem)\par \par # Pre-syncope: Patient describes pre-syncope symptoms with rapid sitting to standing. She is orthostatic in the office by diastolic blood pressure change. Endorses drinking plenty of fluids\par - Advised to increase salt intake.\par - Care when changing positions\par \par # Arm pain: Musculoskeletal discomfort at lateral/superior L pectoral region and noting parasthesias down arm to hands\par - X-ray c-spine, as above\par - Avoid NSAIDs given gastritis.

## 2019-01-23 NOTE — HISTORY OF PRESENT ILLNESS
[de-identified] : [employer], [place] [id number], interpretor name Rodrigue\par 28F PMH allergic rhinitis, onychomycosis s/p L hallux avulsion, ectopic pregnancy, bacterial vaginosis (recently started on metronidazole), recently seen (9/2018) with complaint of epigastric pain, who presents today with complaints of headache and shoulder pain.\par Regarding her recent epigastric pain, it was associated with eating and melena with positive stool guaiac in office. She was seen in the ED on 9/18/18 with normal abdominal US and diagnosed with non-bleeding gastritis and started on PPI with improvement. She reports only having a 1 week supply of the PPI and not taking it since then. Her symptoms are improved.\par She complains of headache intermittent for over 1 year. Occurs any position or time of day, usually in the back of her head when lying down and in the front of her head and her eyes when she is standing. Feels as though there is burning in her eyes at those times.She notes it when she stands up rapidly, which can be associated with light-headedness and feeling like she is going to pass out. Denies associated nausea, photophobia, phonophobia. Usually goes away on its own after an hour or so but may recur later. Episodes are 2-3 times per week. Does note neck tightness. Does not take any medication for it. Had CT Head in 2/2017 which was negative for pathology, had a neurology referral but missed her appointment.\par She also notes pain in her L shoulder near the superior/lateral pectoral region that she states feels muscular and similar to having "just worked out". She has had the pain intermittently for 2 months and is associated with radiation of tingling down her arm to the fingers. Not having it now and cannot note the distribution on the hand and arm of her symptoms.

## 2019-01-25 ENCOUNTER — APPOINTMENT (OUTPATIENT)
Dept: INTERNAL MEDICINE | Facility: CLINIC | Age: 29
End: 2019-01-25

## 2019-01-25 ENCOUNTER — OUTPATIENT (OUTPATIENT)
Dept: OUTPATIENT SERVICES | Facility: HOSPITAL | Age: 29
LOS: 1 days | End: 2019-01-25
Payer: SELF-PAY

## 2019-01-25 VITALS
WEIGHT: 137 LBS | BODY MASS INDEX: 23.39 KG/M2 | DIASTOLIC BLOOD PRESSURE: 70 MMHG | HEIGHT: 64 IN | SYSTOLIC BLOOD PRESSURE: 110 MMHG

## 2019-01-25 DIAGNOSIS — J02.0 STREPTOCOCCAL PHARYNGITIS: ICD-10-CM

## 2019-01-25 DIAGNOSIS — O00.90 UNSPECIFIED ECTOPIC PREGNANCY WITHOUT INTRAUTERINE PREGNANCY: Chronic | ICD-10-CM

## 2019-01-25 DIAGNOSIS — J06.9 ACUTE UPPER RESPIRATORY INFECTION, UNSPECIFIED: ICD-10-CM

## 2019-01-25 DIAGNOSIS — I10 ESSENTIAL (PRIMARY) HYPERTENSION: ICD-10-CM

## 2019-01-25 PROCEDURE — G0463: CPT

## 2019-02-13 NOTE — PHYSICAL EXAM

## 2019-02-13 NOTE — ASSESSMENT
[FreeTextEntry1] : 29 yo F with PMHx of allergic rhinitis, gastritis presenting with 1 week of sore throat and nasal congestion, rapid strep test consistent with strep throat due to group A strep \par \par #URI 2/2 strep throat \par - will prescribe Amoxicillin 500mg BID for 10 days \par - Patient with improving cough and nasal congestion\par - will start Benzonatate 200mg \par - start Mucinex DM \par - will consider prednisone taper to treat airway inflammation if symptoms do not improve \par \par #Gastritis\par - Prior hx has improved with PPI use in the past.\par - Patient non-compliant with Pylori testing\par \par #HCM\par - Last pap smear 12/18 UTD \par \par Discussed with Dr. Manning\par Gage Santiago MD\par Internal Medicine, PGY 1

## 2019-02-13 NOTE — HISTORY OF PRESENT ILLNESS
[Congestion] : congestion [Sore Throat] : sore throat [FreeTextEntry8] : 27 yo F with PMHx of allergic rhinitis, gastritis presenting with 1 week of sore throat and nasal congestion. Throat pain not relieved with ibuprofen, patient additionally endorsing chills. Improving minimally with ibuprofen. No sick contacts and no recent travel. \par \par

## 2019-02-26 LAB — S PYO AG SPEC QL IA: POSITIVE

## 2019-03-05 ENCOUNTER — APPOINTMENT (OUTPATIENT)
Dept: NEUROLOGY | Facility: HOSPITAL | Age: 29
End: 2019-03-05

## 2019-04-01 ENCOUNTER — APPOINTMENT (OUTPATIENT)
Dept: INTERNAL MEDICINE | Facility: CLINIC | Age: 29
End: 2019-04-01

## 2019-04-08 ENCOUNTER — APPOINTMENT (OUTPATIENT)
Dept: INTERNAL MEDICINE | Facility: CLINIC | Age: 29
End: 2019-04-08

## 2019-05-20 ENCOUNTER — APPOINTMENT (OUTPATIENT)
Dept: INTERNAL MEDICINE | Facility: CLINIC | Age: 29
End: 2019-05-20

## 2019-05-21 ENCOUNTER — LABORATORY RESULT (OUTPATIENT)
Age: 29
End: 2019-05-21

## 2019-05-22 ENCOUNTER — APPOINTMENT (OUTPATIENT)
Dept: INTERNAL MEDICINE | Facility: CLINIC | Age: 29
End: 2019-05-22

## 2019-05-22 ENCOUNTER — OUTPATIENT (OUTPATIENT)
Dept: OUTPATIENT SERVICES | Facility: HOSPITAL | Age: 29
LOS: 1 days | End: 2019-05-22
Payer: SELF-PAY

## 2019-05-22 VITALS
SYSTOLIC BLOOD PRESSURE: 110 MMHG | DIASTOLIC BLOOD PRESSURE: 60 MMHG | HEIGHT: 64 IN | WEIGHT: 141 LBS | OXYGEN SATURATION: 98 % | BODY MASS INDEX: 24.07 KG/M2 | HEART RATE: 67 BPM

## 2019-05-22 DIAGNOSIS — O00.90 UNSPECIFIED ECTOPIC PREGNANCY WITHOUT INTRAUTERINE PREGNANCY: Chronic | ICD-10-CM

## 2019-05-22 DIAGNOSIS — J02.0 STREPTOCOCCAL PHARYNGITIS: ICD-10-CM

## 2019-05-22 DIAGNOSIS — I10 ESSENTIAL (PRIMARY) HYPERTENSION: ICD-10-CM

## 2019-05-22 PROCEDURE — 84443 ASSAY THYROID STIM HORMONE: CPT

## 2019-05-22 PROCEDURE — 83036 HEMOGLOBIN GLYCOSYLATED A1C: CPT

## 2019-05-22 PROCEDURE — G0463: CPT

## 2019-05-22 PROCEDURE — 85027 COMPLETE CBC AUTOMATED: CPT

## 2019-05-22 PROCEDURE — 80053 COMPREHEN METABOLIC PANEL: CPT

## 2019-05-22 PROCEDURE — 36415 COLL VENOUS BLD VENIPUNCTURE: CPT

## 2019-05-23 LAB
ALBUMIN SERPL ELPH-MCNC: 4.4 G/DL — SIGNIFICANT CHANGE UP (ref 3.3–5)
ALP SERPL-CCNC: 64 U/L — SIGNIFICANT CHANGE UP (ref 40–120)
ALT FLD-CCNC: 19 U/L — SIGNIFICANT CHANGE UP (ref 10–45)
ANION GAP SERPL CALC-SCNC: 11 MMOL/L — SIGNIFICANT CHANGE UP (ref 5–17)
AST SERPL-CCNC: 19 U/L — SIGNIFICANT CHANGE UP (ref 10–40)
BILIRUB SERPL-MCNC: 0.2 MG/DL — SIGNIFICANT CHANGE UP (ref 0.2–1.2)
BUN SERPL-MCNC: 17 MG/DL — SIGNIFICANT CHANGE UP (ref 7–23)
CALCIUM SERPL-MCNC: 9.1 MG/DL — SIGNIFICANT CHANGE UP (ref 8.4–10.5)
CHLORIDE SERPL-SCNC: 104 MMOL/L — SIGNIFICANT CHANGE UP (ref 96–108)
CO2 SERPL-SCNC: 26 MMOL/L — SIGNIFICANT CHANGE UP (ref 22–31)
CREAT SERPL-MCNC: 0.77 MG/DL — SIGNIFICANT CHANGE UP (ref 0.5–1.3)
ESTIMATED AVERAGE GLUCOSE: 108 MG/DL — SIGNIFICANT CHANGE UP (ref 68–114)
GLUCOSE SERPL-MCNC: 96 MG/DL — SIGNIFICANT CHANGE UP (ref 70–99)
HBA1C BLD-MCNC: 5.4 % — SIGNIFICANT CHANGE UP (ref 4–5.6)
HCT VFR BLD CALC: 37.3 % — SIGNIFICANT CHANGE UP (ref 34.5–45)
HGB BLD-MCNC: 12.2 G/DL — SIGNIFICANT CHANGE UP (ref 11.5–15.5)
MCHC RBC-ENTMCNC: 31.3 PG — SIGNIFICANT CHANGE UP (ref 27–34)
MCHC RBC-ENTMCNC: 32.7 GM/DL — SIGNIFICANT CHANGE UP (ref 32–36)
MCV RBC AUTO: 95.6 FL — SIGNIFICANT CHANGE UP (ref 80–100)
PLATELET # BLD AUTO: 195 K/UL — SIGNIFICANT CHANGE UP (ref 150–400)
POTASSIUM SERPL-MCNC: 4.1 MMOL/L — SIGNIFICANT CHANGE UP (ref 3.5–5.3)
POTASSIUM SERPL-SCNC: 4.1 MMOL/L — SIGNIFICANT CHANGE UP (ref 3.5–5.3)
PROT SERPL-MCNC: 7.1 G/DL — SIGNIFICANT CHANGE UP (ref 6–8.3)
RBC # BLD: 3.9 M/UL — SIGNIFICANT CHANGE UP (ref 3.8–5.2)
RBC # FLD: 13 % — SIGNIFICANT CHANGE UP (ref 10.3–14.5)
SODIUM SERPL-SCNC: 141 MMOL/L — SIGNIFICANT CHANGE UP (ref 135–145)
TSH SERPL-MCNC: 1.15 UIU/ML — SIGNIFICANT CHANGE UP (ref 0.27–4.2)
WBC # BLD: 5.01 K/UL — SIGNIFICANT CHANGE UP (ref 3.8–10.5)
WBC # FLD AUTO: 5.01 K/UL — SIGNIFICANT CHANGE UP (ref 3.8–10.5)

## 2019-05-24 NOTE — PHYSICAL EXAM
[No Acute Distress] : no acute distress [Well-Appearing] : well-appearing [Normal Sclera/Conjunctiva] : normal sclera/conjunctiva [PERRL] : pupils equal round and reactive to light [EOMI] : extraocular movements intact [Normal Outer Ear/Nose] : the outer ears and nose were normal in appearance [Normal Oropharynx] : the oropharynx was normal [No JVD] : no jugular venous distention [Supple] : supple [Thyroid Normal, No Nodules] : the thyroid was normal and there were no nodules present [No Respiratory Distress] : no respiratory distress  [Clear to Auscultation] : lungs were clear to auscultation bilaterally [Normal Rate] : normal rate  [Regular Rhythm] : with a regular rhythm [Normal S1, S2] : normal S1 and S2 [No Murmur] : no murmur heard [Pedal Pulses Present] : the pedal pulses are present [No Edema] : there was no peripheral edema [No Axillary Lymphadenopathy] : no axillary lymphadenopathy [Soft] : abdomen soft [Non-distended] : non-distended [Normal Bowel Sounds] : normal bowel sounds [Normal Posterior Cervical Nodes] : no posterior cervical lymphadenopathy [Normal Anterior Cervical Nodes] : no anterior cervical lymphadenopathy [de-identified] : no wheezes, rales or rhonchi [de-identified] : no skin retraction, open lesions or obvious lumps appreciated. Dense breast with some tenderness to palpation at 7 oclock in the right breast. [de-identified] : mild tenderness reproduced in the epigastric and periumbilical region. Negative arceo's sign. No tenderness at [place].  [de-identified] : paraspinous muscle tenderness in the upper cervical region of the spine [de-identified] : AAO x 3, Cr II - XII intact, 5/5 strength in UE and LE. sensation intact. Gait testing unremarkable

## 2019-05-24 NOTE — REVIEW OF SYSTEMS
[Vision Problems] : vision problems [Abdominal Pain] : abdominal pain [Nausea] : nausea [Back Pain] : back pain [Fever] : no fever [Chills] : no chills [Night Sweats] : no night sweats [Recent Change In Weight] : ~T no recent weight change [Sore Throat] : no sore throat [Chest Pain] : no chest pain [Palpitations] : no palpitations [Lower Ext Edema] : no lower extremity edema [Shortness Of Breath] : no shortness of breath [Constipation] : no constipation [Melena] : no melena [Dysuria] : no dysuria [Frequency] : no frequency [FreeTextEntry7] : intermittent BRBPR
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
## 2019-05-24 NOTE — ASSESSMENT
[FreeTextEntry1] : 29 year old female with hx of gastritis who presents for CPE\par \par # HCM\par - UTD on immunizations\par - last pap smear in 2017 -- > due for pap smear in 2020\par - cbc, cmp, tsh, a1c ordered \par \par # VIsion changes \par - patient with reported darkness in her field of vision and associated numbness in the tongue and back of her neck\par - patient has no risk factors from personal history and no significant family hx to suggest stroke\par - given that symptoms are worse in the morning and resolve by the end of the day, need to consider Idiopathic intracranial hypertension -- will get CTH and give neurology referral for evaluation\par \par # Abdominal pain \par - patient with reported persistent abdominal pain with hx of gastritis and intermittent difficulty with PO intake\par - will give GI referral, especially given reported melanotic stool and occasionally blood mixed with stool\par - H pylori stool test ordered, explained to patient that once stool sample is submitted, PPi prescription already sent to pharmacy and will need to trial for 6 weeks in the interim - - instructed to cut out caffeine in the interim. \par \par # breast pain\par - patient reports pain in her right breast\par - exam with pain at 7 o'clock on paplation with what appears to be dense breast tissue\par - will send for breast U/S, had U/S in 2018 with no evidence of malignancy \par \par RTC in 5 weeks \par \par Case discussed with Dr. Peña

## 2019-05-29 DIAGNOSIS — R10.816 EPIGASTRIC ABDOMINAL TENDERNESS: ICD-10-CM

## 2019-06-16 ENCOUNTER — FORM ENCOUNTER (OUTPATIENT)
Age: 29
End: 2019-06-16

## 2019-06-17 ENCOUNTER — OUTPATIENT (OUTPATIENT)
Dept: OUTPATIENT SERVICES | Facility: HOSPITAL | Age: 29
LOS: 1 days | End: 2019-06-17

## 2019-06-17 ENCOUNTER — APPOINTMENT (OUTPATIENT)
Dept: CT IMAGING | Facility: CLINIC | Age: 29
End: 2019-06-17
Payer: COMMERCIAL

## 2019-06-17 DIAGNOSIS — O00.90 UNSPECIFIED ECTOPIC PREGNANCY WITHOUT INTRAUTERINE PREGNANCY: Chronic | ICD-10-CM

## 2019-06-17 DIAGNOSIS — R51 HEADACHE: ICD-10-CM

## 2019-06-17 DIAGNOSIS — R10.816 EPIGASTRIC ABDOMINAL TENDERNESS: ICD-10-CM

## 2019-06-17 PROCEDURE — 70450 CT HEAD/BRAIN W/O DYE: CPT | Mod: 26

## 2019-06-24 DIAGNOSIS — R51 HEADACHE: ICD-10-CM

## 2019-06-25 ENCOUNTER — APPOINTMENT (OUTPATIENT)
Dept: ULTRASOUND IMAGING | Facility: CLINIC | Age: 29
End: 2019-06-25

## 2019-07-22 ENCOUNTER — APPOINTMENT (OUTPATIENT)
Dept: OBGYN | Facility: CLINIC | Age: 29
End: 2019-07-22
Payer: COMMERCIAL

## 2019-07-22 ENCOUNTER — OUTPATIENT (OUTPATIENT)
Dept: OUTPATIENT SERVICES | Facility: HOSPITAL | Age: 29
LOS: 1 days | End: 2019-07-22
Payer: SELF-PAY

## 2019-07-22 VITALS — DIASTOLIC BLOOD PRESSURE: 67 MMHG | SYSTOLIC BLOOD PRESSURE: 100 MMHG | BODY MASS INDEX: 24.89 KG/M2 | WEIGHT: 145 LBS

## 2019-07-22 DIAGNOSIS — O00.90 UNSPECIFIED ECTOPIC PREGNANCY WITHOUT INTRAUTERINE PREGNANCY: Chronic | ICD-10-CM

## 2019-07-22 DIAGNOSIS — Z30.432 ENCOUNTER FOR REMOVAL OF INTRAUTERINE CONTRACEPTIVE DEVICE: ICD-10-CM

## 2019-07-22 DIAGNOSIS — N76.0 ACUTE VAGINITIS: ICD-10-CM

## 2019-07-22 PROCEDURE — G0463: CPT

## 2019-07-22 PROCEDURE — 99213 OFFICE O/P EST LOW 20 MIN: CPT | Mod: GE

## 2019-07-22 NOTE — PROCEDURE
[IUD Removal] : IUD [Paraguard] : Keith [Fertility Desired] : desired fertility [Patient] : patient [Risks] : risks [Benefits] : benefits [Alternatives] : alternatives [Consent Obtained] : consent was obtained prior to the procedure and is detailed in the patient's record [Speculum Placed] : a speculum was placed in the vagina [Strings Visualized] : the IUD strings were visualized [IUD Removed - Forceps] : the strings were grasped with forceps and the IUD was removed [IUD Discarded] : discarded [Tolerated Well] : the patient tolerated the procedure well [No Complications] : none [Heavy Vaginal Bleeding] : for heavy vaginal bleeding [Pelvic Pain] : for pelvic pain [PRN] : as needed

## 2019-07-28 NOTE — HISTORY OF PRESENT ILLNESS
[___ Month(s) Ago] : [unfilled] month(s) ago [Good] : being in good health [Definite:  ___ (Date)] : the last menstrual period was [unfilled] [Menstrual Cramps] : menstrual cramps [Sexually Active] : is sexually active [Monogamous] : is monogamous [Spotting Between  Menses] : no spotting between menses

## 2019-08-07 DIAGNOSIS — Z30.432 ENCOUNTER FOR REMOVAL OF INTRAUTERINE CONTRACEPTIVE DEVICE: ICD-10-CM

## 2019-08-12 ENCOUNTER — OUTPATIENT (OUTPATIENT)
Dept: OUTPATIENT SERVICES | Facility: HOSPITAL | Age: 29
LOS: 1 days | End: 2019-08-12
Payer: SELF-PAY

## 2019-08-12 ENCOUNTER — APPOINTMENT (OUTPATIENT)
Dept: INTERNAL MEDICINE | Facility: CLINIC | Age: 29
End: 2019-08-12

## 2019-08-12 ENCOUNTER — LABORATORY RESULT (OUTPATIENT)
Age: 29
End: 2019-08-12

## 2019-08-12 VITALS
DIASTOLIC BLOOD PRESSURE: 60 MMHG | HEIGHT: 64 IN | WEIGHT: 140 LBS | HEART RATE: 78 BPM | BODY MASS INDEX: 23.9 KG/M2 | SYSTOLIC BLOOD PRESSURE: 98 MMHG | OXYGEN SATURATION: 98 %

## 2019-08-12 DIAGNOSIS — O00.90 UNSPECIFIED ECTOPIC PREGNANCY WITHOUT INTRAUTERINE PREGNANCY: Chronic | ICD-10-CM

## 2019-08-12 DIAGNOSIS — S91.312D LACERATION W/OUT FOREIGN BODY, LEFT FOOT, SUBSEQUENT ENCOUNTER: ICD-10-CM

## 2019-08-12 DIAGNOSIS — I10 ESSENTIAL (PRIMARY) HYPERTENSION: ICD-10-CM

## 2019-08-12 LAB
HCT VFR BLD CALC: 42.8 % — SIGNIFICANT CHANGE UP (ref 34.5–45)
HGB BLD-MCNC: 13.2 G/DL — SIGNIFICANT CHANGE UP (ref 11.5–15.5)
MCHC RBC-ENTMCNC: 30.8 GM/DL — LOW (ref 32–36)
MCHC RBC-ENTMCNC: 31 PG — SIGNIFICANT CHANGE UP (ref 27–34)
MCV RBC AUTO: 100.5 FL — HIGH (ref 80–100)
PLATELET # BLD AUTO: 209 K/UL — SIGNIFICANT CHANGE UP (ref 150–400)
RBC # BLD: 4.26 M/UL — SIGNIFICANT CHANGE UP (ref 3.8–5.2)
RBC # FLD: 13.5 % — SIGNIFICANT CHANGE UP (ref 10.3–14.5)
WBC # BLD: 4.52 K/UL — SIGNIFICANT CHANGE UP (ref 3.8–10.5)
WBC # FLD AUTO: 4.52 K/UL — SIGNIFICANT CHANGE UP (ref 3.8–10.5)

## 2019-08-12 PROCEDURE — G0463: CPT

## 2019-08-12 PROCEDURE — 85027 COMPLETE CBC AUTOMATED: CPT

## 2019-08-12 RX ORDER — BENZONATATE 200 MG/1
200 CAPSULE ORAL 3 TIMES DAILY
Qty: 30 | Refills: 1 | Status: DISCONTINUED | COMMUNITY
Start: 2019-01-25 | End: 2019-08-12

## 2019-08-12 RX ORDER — METRONIDAZOLE 7.5 MG/G
0.75 GEL VAGINAL
Qty: 1 | Refills: 0 | Status: DISCONTINUED | COMMUNITY
Start: 2018-11-05 | End: 2019-08-12

## 2019-08-12 RX ORDER — PANTOPRAZOLE 40 MG/1
40 TABLET, DELAYED RELEASE ORAL DAILY
Qty: 30 | Refills: 0 | Status: DISCONTINUED | COMMUNITY
Start: 2019-05-22 | End: 2019-08-12

## 2019-08-12 RX ORDER — AMOXICILLIN 500 MG/1
500 TABLET, FILM COATED ORAL
Qty: 20 | Refills: 0 | Status: DISCONTINUED | COMMUNITY
Start: 2019-01-25 | End: 2019-08-12

## 2019-08-12 NOTE — REVIEW OF SYSTEMS
[Negative] : Genitourinary [Fever] : no fever [Chills] : no chills [Night Sweats] : no night sweats [Vomiting] : no vomiting [Nausea] : no nausea [Joint Pain] : no joint pain

## 2019-08-12 NOTE — ASSESSMENT
[FreeTextEntry1] : Patient taking a medication prescribed by GYN to help her with efforts to become pregnant. The medication is not recorded in her chart. She was advised to share this information the next time she comes in order to avoid drug interactions.

## 2019-08-12 NOTE — HISTORY OF PRESENT ILLNESS
[Other: _____] : [unfilled] [FreeTextEntry1] : [id number] [FreeTextEntry8] : 29F history of hammer toe, bunions comes in with 2 weeks of pain in L big toe. Toe is peeling and hurting, mostly on the medial aspect but sometimes all over. Pain is worse when wearing shoes and pressing on it. She is missing a nail after a trauma in November 2016. It was removed in 2017 and grew back for a few weeks but stopped. She has been using an acrylic nail for about two months but stopped about 3 days ago. She continued using the clotrimazole they prescribed when she first had it removed but with no help. She did not bring the cream or toenail with her today. She used an antifungal cream that was prescribed recently , but she does not remember what it's called and feels is helping. She takes no medications by mouth, including tylenol/ibuprofen. At home she walks without shoes or socks. When she takes a shower, she dries her feet normally. She has tried using hydrogen peroxide in the past but she tries not to touch it very much. Denies fever, chills or discharge these past few weeks. No other concerns or questions today. \par \par In the past she has had workup by podiatry for bunion surgery with X rays, but she opted for conservative management and hasn't had the surgery.

## 2019-08-12 NOTE — PHYSICAL EXAM
[No Acute Distress] : no acute distress [Supple] : supple [No Edema] : there was no peripheral edema [No Extremity Clubbing/Cyanosis] : no extremity clubbing/cyanosis [Normal] : no joint swelling and grossly normal strength and tone [No Rash] : no rash [Normal Affect] : the affect was normal [No Focal Deficits] : no focal deficits [de-identified] : L toe missing 75% of distal nail, no erythema. TTP only on deformed nail bed, not on surrounding tissues. No discharge/purulence, bleeding, or crusting.

## 2019-08-13 DIAGNOSIS — S91.312D LACERATION WITHOUT FOREIGN BODY, LEFT FOOT, SUBSEQUENT ENCOUNTER: ICD-10-CM

## 2019-10-11 ENCOUNTER — EMERGENCY (EMERGENCY)
Facility: HOSPITAL | Age: 29
LOS: 1 days | Discharge: HOME | End: 2019-10-11
Payer: MEDICAID

## 2019-10-11 VITALS
HEIGHT: 63 IN | HEART RATE: 72 BPM | TEMPERATURE: 99 F | DIASTOLIC BLOOD PRESSURE: 72 MMHG | RESPIRATION RATE: 18 BRPM | SYSTOLIC BLOOD PRESSURE: 108 MMHG | WEIGHT: 147.93 LBS | OXYGEN SATURATION: 98 %

## 2019-10-11 DIAGNOSIS — O00.90 UNSPECIFIED ECTOPIC PREGNANCY WITHOUT INTRAUTERINE PREGNANCY: Chronic | ICD-10-CM

## 2019-10-11 PROCEDURE — 99283 EMERGENCY DEPT VISIT LOW MDM: CPT

## 2019-10-11 RX ORDER — LIDOCAINE 4 G/100G
1 CREAM TOPICAL ONCE
Refills: 0 | Status: COMPLETED | OUTPATIENT
Start: 2019-10-11 | End: 2019-10-11

## 2019-10-11 RX ORDER — ACETAMINOPHEN 500 MG
650 TABLET ORAL ONCE
Refills: 0 | Status: COMPLETED | OUTPATIENT
Start: 2019-10-11 | End: 2019-10-11

## 2019-10-11 RX ORDER — IBUPROFEN 200 MG
600 TABLET ORAL ONCE
Refills: 0 | Status: COMPLETED | OUTPATIENT
Start: 2019-10-11 | End: 2019-10-11

## 2019-10-11 RX ADMIN — Medication 650 MILLIGRAM(S): at 11:17

## 2019-10-11 RX ADMIN — Medication 650 MILLIGRAM(S): at 10:47

## 2019-10-11 RX ADMIN — LIDOCAINE 1 PATCH: 4 CREAM TOPICAL at 10:47

## 2019-10-11 RX ADMIN — Medication 600 MILLIGRAM(S): at 11:17

## 2019-10-11 RX ADMIN — Medication 600 MILLIGRAM(S): at 10:47

## 2019-10-11 NOTE — ED PROVIDER NOTE - PATIENT PORTAL LINK FT
You can access the FollowMyHealth Patient Portal offered by [facility] by registering at the following website: [email or web address]. By joining [facility]’s FollowMyHealth portal, you will also be able to view your health information using other applications (apps) compatible with our system.

## 2019-10-11 NOTE — ED PROVIDER NOTE - NS ED ROS FT
Constitutional: no fevers or chills  HEENT: no visual changes, no sore throat, no rhinorrhea  CV: no cp or palpitations  Resp: no sob or cough  GI: no abd pain, no nausea, no vomiting, no diarrhea, no constipation  : no dysuria, no hematuria  MSK: left neck pain;  skin: no rashes  neuro: +HA, no numbness; no weakness, no tingling  ROS statement: all other ROS negative except as per HPI

## 2019-10-11 NOTE — ED PROVIDER NOTE - ATTENDING CONTRIBUTION TO CARE
29F, no sig pmh, presents with L-sided neck pain s/p mechanical trip and fall yesterday. While at work, pt was trying to rearrange boxes on staircase, lost footing, fell from standing. Struck head on plastic box. No LOC, no preceding sx. Pt did have transient dizziness for ~5 min after fall, none since. Did have initial mild headache, which has since resolved. This AM, pt awoke with L-sided neck pain that limited ability to rotate neck, which prompted ED visit. No n/v, vision changes, numbness, weakness, cp, sob, abd pain, or other complaints.    PE: NAD, NCAT, MMM, Trachea midline, Normal conjunctiva, lungs CTAB, S1/S2 RRR, Normal perfusion, 2+ radial pulses bilat, Abdomen Soft, NTND, No rebound/guarding, No LE edema, No deformity of extremities, No rashes,  No focal motor or sensory deficits. CN II-XII intact. Visual fields intact. EOMI, PERRLA. Facial sensation equal bilat. Smile and eye closure equal bilat. Hearing intact bilat. Palate elevation equal, tongue protrusion midline. Lateral head rotation equal bilat. 5/5 strength UE and LE bilat. Sensation grossly intact. No pronator drift. Normal finger to nose. Negative Romberg. Normal gait. No midline C, T, L ttp. +L lateral neck ttp. Able to range neck though with pain.    Pt very well appearing. VS appropriate. C-spine cleared per NEXUS criteria. Extremely low suspicion of any sig intracranial injury. Most c/w muscle strain. To give meds, plan for d/c with outpatietn f/u. - Ravi Allen MD

## 2019-10-11 NOTE — ED PROVIDER NOTE - NSFOLLOWUPINSTRUCTIONS_ED_ALL_ED_FT
Puede serafin Tylenol cada 6 horas según sea necesario para el dolor.  Puede serafin Advil cada 6 horas peyton sea necesario para el dolor.  Puede serafin ambos al mismo tiempo.  Por favor michi un seguimiento con barraza médico de atención primaria dentro de 1 semana según sea necesario.   Regresar a la juarez de emergencias en alejandro de que desarrolle un empeoramiento de evert síntomas.

## 2019-10-11 NOTE — ED PROVIDER NOTE - CLINICAL SUMMARY MEDICAL DECISION MAKING FREE TEXT BOX
30 yo F no pmhx presents c/o neck pain s/p mechanical trip and fall onto her back yesterday. presents today due to left sided neck pain/stiffness. denies ha, loc, n/v, dizziness, cp, sob, fever, chills, abd pain, other injuries. has not attempted analgesia. VSS, afebrile. exam shows no focal neuro deficits. has left sided cervical tenderness w/o midline tenderness, stepoffs, deformities. ambulates in ED. pt likely w/ neck msk strain. will tx w/ tylenol, motrin, lidoderm patch, reassess and plan to d/c patient

## 2019-10-11 NOTE — ED PROVIDER NOTE - PHYSICAL EXAMINATION
PHYSICAL EXAM:  GENERAL: non-toxic appearing; in no respiratory distress  HEAD: Atraumatic, Normocephalic; no evidence of basilar skull fx  NECK: No JVD; no midline Cervical tenderness; no stepoffs or deformities; left cervical tenderness to palpation; limited range of motion of neck to left side due to pain;  EYES: PERRL, EOMs intact b/l w/out deficits  CHEST/LUNG: CTAB no wheezes/rhonchi/rales  HEART: RRR no murmur/gallops/rubs  ABDOMEN: +BS, soft, NT, ND  EXTREMITIES: No LE edema, +2 radial pulses b/l  MUSCULOSKELETAL: FROM of all 4 extremities; no gross deformities;   NERVOUS SYSTEM:  A&Ox3, No motor deficits or sensory deficits; CNII-XII intact; no focal neurologic deficits; ambulates without dififculty  SKIN:  No new rashes

## 2019-10-11 NOTE — ED PROVIDER NOTE - OBJECTIVE STATEMENT
Translation services provided #[id number]  28 yo F with no significant PMHx presents c/o left neck pain s/p mechanical trip and fall yesterday at work. Pt states she tripped over boxes and fell onto her back. pt hit her head on plastic boxes and transient dizziness lasting 5 minutes. pt presents today due to neck pain and difficulty moving her neck to the left due to pain. has not tried any analgesia at home. pt denies loc, n/v, numbness, tingling, current dizziness, cp, sob, abd pain, changes in vision. States she is not pregnant

## 2019-11-12 ENCOUNTER — OUTPATIENT (OUTPATIENT)
Dept: OUTPATIENT SERVICES | Facility: HOSPITAL | Age: 29
LOS: 1 days | End: 2019-11-12
Payer: SELF-PAY

## 2019-11-12 ENCOUNTER — APPOINTMENT (OUTPATIENT)
Dept: NEUROLOGY | Facility: HOSPITAL | Age: 29
End: 2019-11-12

## 2019-11-12 VITALS
SYSTOLIC BLOOD PRESSURE: 112 MMHG | BODY MASS INDEX: 24.75 KG/M2 | WEIGHT: 145 LBS | HEIGHT: 64 IN | DIASTOLIC BLOOD PRESSURE: 56 MMHG | HEART RATE: 73 BPM

## 2019-11-12 DIAGNOSIS — O00.90 UNSPECIFIED ECTOPIC PREGNANCY WITHOUT INTRAUTERINE PREGNANCY: Chronic | ICD-10-CM

## 2019-11-12 DIAGNOSIS — R51 HEADACHE: ICD-10-CM

## 2019-11-12 PROCEDURE — G0463: CPT

## 2019-11-12 NOTE — PHYSICAL EXAM
[General Appearance - Alert] : alert [Oriented To Time, Place, And Person] : oriented to person, place, and time [General Appearance - In No Acute Distress] : in no acute distress [Place] : oriented to place [Person] : oriented to person [Short Term Intact] : short term memory intact [Time] : oriented to time [Cranial Nerves Optic (II)] : visual acuity intact bilaterally,  visual fields full to confrontation, pupils equal round and reactive to light [I: Normal Smell] : smell intact bilaterally [Cranial Nerves Oculomotor (III)] : extraocular motion intact [Cranial Nerves Trigeminal (V)] : facial sensation intact symmetrically [Cranial Nerves Facial (VII)] : face symmetrical [Cranial Nerves Vestibulocochlear (VIII)] : hearing was intact bilaterally [Cranial Nerves Hypoglossal (XII)] : there was no tongue deviation with protrusion [Cranial Nerves Glossopharyngeal (IX)] : tongue and palate midline [Cranial Nerves Accessory (XI - Cranial And Spinal)] : head turning and shoulder shrug symmetric [Motor Tone] : muscle tone was normal in all four extremities [Motor Strength] : muscle strength was normal in all four extremities [Motor Handedness Right-Handed] : the patient is right hand dominant [Sensation Pain / Temperature Decrease] : pain and temperature was intact [Sensation Tactile Decrease] : light touch was intact [Balance] : balance was intact [2+] : Ankle jerk left 2+ [Sclera] : the sclera and conjunctiva were normal [PERRL With Normal Accommodation] : pupils were equal in size, round, reactive to light, with normal accommodation [Extraocular Movements] : extraocular movements were intact [Optic Disc Abnormality] : the optic disc were normal in size and color [Abnormal Walk] : normal gait [Full Visual Field] : full visual field [Paresis Pronator Drift Left-Sided] : no pronator drift on the left [Paresis Pronator Drift Right-Sided] : no pronator drift on the right

## 2019-11-12 NOTE — DISCUSSION/SUMMARY
[FreeTextEntry1] : Patient is a 29 year old female with no past medical history who presents to neurology clinic as a first time visit for headaches. She has had these headaches for many years, but they have gotten worse over the past 2 years. She also fell recently, which has made her headaches a bit worse. She also experiences pain around the eyes, and she has woken up with these headaches. The headaches are throbbing/pulsating in quality, and she also has intermittent double vision and tinnitus. \par Recent CT head was unremarkable, done for suspicion of IIH. \par Neurological exam and eye exam are unremarkable\par \par Plan\par - MRI brain non con\par - high volume LP, if OP > 20 will start diamox. obtain opening, closing pressure and send for basic studies.

## 2019-11-12 NOTE — HISTORY OF PRESENT ILLNESS
[FreeTextEntry1] : Patient is a 29 year old female with no past medical history who presents to neurology clinic as a first time visit for headaches (referred by medicine - referral from May 2019). The headaches are either occipital or bi temporal, radiating down to her Cspine, + nausea but no vomiting, and has intermittently seeing black that resolves quickly, and these symptoms occur mostly in the AM and persist throughout the day. Symptoms occur 1-2x/day, 4 days a week. No history of strokes, she has an IUD. No focal deficits. She has had these headaches for many years, but they have gotten worse over the past 2 years. She also fell recently, which has made her headaches a bit worse. She also experiences pain around the eyes, and she has woken up with these headaches. The headaches are throbbing/pulsating in quality, and she also has intermittent double vision and tinnitus. \par Recent CT head was unremarkable, done for suspicion of IIH.

## 2019-11-12 NOTE — END OF VISIT
[] : Resident [FreeTextEntry3] : No papilledema on exam today. Hx of visual obscurations, episodic diplopia, pulsatile tinnitus suggestive of IIH.

## 2019-11-18 ENCOUNTER — OUTPATIENT (OUTPATIENT)
Dept: OUTPATIENT SERVICES | Facility: HOSPITAL | Age: 29
LOS: 1 days | End: 2019-11-18
Payer: SELF-PAY

## 2019-11-18 ENCOUNTER — APPOINTMENT (OUTPATIENT)
Dept: RADIOLOGY | Facility: HOSPITAL | Age: 29
End: 2019-11-18

## 2019-11-18 DIAGNOSIS — O00.90 UNSPECIFIED ECTOPIC PREGNANCY WITHOUT INTRAUTERINE PREGNANCY: Chronic | ICD-10-CM

## 2019-11-18 DIAGNOSIS — R51 HEADACHE: ICD-10-CM

## 2019-11-18 LAB
APPEARANCE CSF: CLEAR — SIGNIFICANT CHANGE UP
COLOR CSF: SIGNIFICANT CHANGE UP
GLUCOSE CSF-MCNC: 57 MG/DL — SIGNIFICANT CHANGE UP (ref 40–70)
GRAM STN FLD: SIGNIFICANT CHANGE UP
LDH CSF L TO P-CCNC: <15 U/L — SIGNIFICANT CHANGE UP
LDH FLD-CCNC: <15 U/L — SIGNIFICANT CHANGE UP
NEUTROPHILS # CSF: SIGNIFICANT CHANGE UP % (ref 0–6)
NRBC NFR CSF: 1 /UL — SIGNIFICANT CHANGE UP (ref 0–5)
PROT CSF-MCNC: 34 MG/DL — SIGNIFICANT CHANGE UP (ref 15–45)
RBC # CSF: 9 /UL — HIGH (ref 0–0)
SPECIMEN SOURCE: SIGNIFICANT CHANGE UP
TUBE TYPE: SIGNIFICANT CHANGE UP

## 2019-11-18 PROCEDURE — 89051 BODY FLUID CELL COUNT: CPT

## 2019-11-18 PROCEDURE — 62270 DX LMBR SPI PNXR: CPT

## 2019-11-18 PROCEDURE — 87205 SMEAR GRAM STAIN: CPT

## 2019-11-18 PROCEDURE — 83615 LACTATE (LD) (LDH) ENZYME: CPT

## 2019-11-18 PROCEDURE — 82945 GLUCOSE OTHER FLUID: CPT

## 2019-11-18 PROCEDURE — 87070 CULTURE OTHR SPECIMN AEROBIC: CPT

## 2019-11-18 PROCEDURE — 77003 FLUOROGUIDE FOR SPINE INJECT: CPT

## 2019-11-18 PROCEDURE — 77003 FLUOROGUIDE FOR SPINE INJECT: CPT | Mod: 26

## 2019-11-18 PROCEDURE — 84157 ASSAY OF PROTEIN OTHER: CPT

## 2019-11-21 ENCOUNTER — EMERGENCY (EMERGENCY)
Facility: HOSPITAL | Age: 29
LOS: 1 days | Discharge: HOME | End: 2019-11-21
Attending: EMERGENCY MEDICINE
Payer: MEDICAID

## 2019-11-21 ENCOUNTER — APPOINTMENT (OUTPATIENT)
Dept: INTERNAL MEDICINE | Facility: CLINIC | Age: 29
End: 2019-11-21

## 2019-11-21 ENCOUNTER — OUTPATIENT (OUTPATIENT)
Dept: OUTPATIENT SERVICES | Facility: HOSPITAL | Age: 29
LOS: 1 days | End: 2019-11-21
Payer: SELF-PAY

## 2019-11-21 VITALS
HEIGHT: 64 IN | WEIGHT: 145 LBS | SYSTOLIC BLOOD PRESSURE: 98 MMHG | DIASTOLIC BLOOD PRESSURE: 60 MMHG | BODY MASS INDEX: 24.75 KG/M2 | HEART RATE: 70 BPM

## 2019-11-21 VITALS
SYSTOLIC BLOOD PRESSURE: 107 MMHG | HEART RATE: 79 BPM | OXYGEN SATURATION: 100 % | RESPIRATION RATE: 18 BRPM | HEIGHT: 64 IN | TEMPERATURE: 98 F | DIASTOLIC BLOOD PRESSURE: 70 MMHG | WEIGHT: 115.96 LBS

## 2019-11-21 DIAGNOSIS — O00.90 UNSPECIFIED ECTOPIC PREGNANCY WITHOUT INTRAUTERINE PREGNANCY: Chronic | ICD-10-CM

## 2019-11-21 DIAGNOSIS — I10 ESSENTIAL (PRIMARY) HYPERTENSION: ICD-10-CM

## 2019-11-21 LAB
ALBUMIN SERPL ELPH-MCNC: 4.1 G/DL — SIGNIFICANT CHANGE UP (ref 3.3–5)
ALP SERPL-CCNC: 50 U/L — SIGNIFICANT CHANGE UP (ref 40–120)
ALT FLD-CCNC: 27 U/L — SIGNIFICANT CHANGE UP (ref 10–45)
ANION GAP SERPL CALC-SCNC: 12 MMOL/L — SIGNIFICANT CHANGE UP (ref 5–17)
APPEARANCE UR: ABNORMAL
APTT BLD: 33.6 SEC — SIGNIFICANT CHANGE UP (ref 27.5–36.3)
AST SERPL-CCNC: 16 U/L — SIGNIFICANT CHANGE UP (ref 10–40)
BACTERIA # UR AUTO: ABNORMAL
BILIRUB SERPL-MCNC: 0.4 MG/DL — SIGNIFICANT CHANGE UP (ref 0.2–1.2)
BILIRUB UR-MCNC: NEGATIVE — SIGNIFICANT CHANGE UP
BLD GP AB SCN SERPL QL: NEGATIVE — SIGNIFICANT CHANGE UP
BUN SERPL-MCNC: 9 MG/DL — SIGNIFICANT CHANGE UP (ref 7–23)
CALCIUM SERPL-MCNC: 9 MG/DL — SIGNIFICANT CHANGE UP (ref 8.4–10.5)
CHLORIDE SERPL-SCNC: 107 MMOL/L — SIGNIFICANT CHANGE UP (ref 96–108)
CO2 SERPL-SCNC: 22 MMOL/L — SIGNIFICANT CHANGE UP (ref 22–31)
COLOR SPEC: SIGNIFICANT CHANGE UP
CREAT SERPL-MCNC: 0.63 MG/DL — SIGNIFICANT CHANGE UP (ref 0.5–1.3)
CULTURE RESULTS: NO GROWTH — SIGNIFICANT CHANGE UP
DIFF PNL FLD: NEGATIVE — SIGNIFICANT CHANGE UP
EPI CELLS # UR: 27 /HPF — HIGH
GLUCOSE SERPL-MCNC: 107 MG/DL — HIGH (ref 70–99)
GLUCOSE UR QL: NEGATIVE — SIGNIFICANT CHANGE UP
HCT VFR BLD CALC: 37.7 % — SIGNIFICANT CHANGE UP (ref 34.5–45)
HGB BLD-MCNC: 13.1 G/DL — SIGNIFICANT CHANGE UP (ref 11.5–15.5)
HYALINE CASTS # UR AUTO: 0 /LPF — SIGNIFICANT CHANGE UP (ref 0–2)
INR BLD: 1.03 RATIO — SIGNIFICANT CHANGE UP (ref 0.88–1.16)
KETONES UR-MCNC: NEGATIVE — SIGNIFICANT CHANGE UP
LEUKOCYTE ESTERASE UR-ACNC: ABNORMAL
MCHC RBC-ENTMCNC: 32 PG — SIGNIFICANT CHANGE UP (ref 27–34)
MCHC RBC-ENTMCNC: 34.7 GM/DL — SIGNIFICANT CHANGE UP (ref 32–36)
MCV RBC AUTO: 92 FL — SIGNIFICANT CHANGE UP (ref 80–100)
NITRITE UR-MCNC: NEGATIVE — SIGNIFICANT CHANGE UP
NRBC # BLD: 0 /100 WBCS — SIGNIFICANT CHANGE UP (ref 0–0)
PH UR: 7 — SIGNIFICANT CHANGE UP (ref 5–8)
PLATELET # BLD AUTO: 174 K/UL — SIGNIFICANT CHANGE UP (ref 150–400)
POTASSIUM SERPL-MCNC: 4 MMOL/L — SIGNIFICANT CHANGE UP (ref 3.5–5.3)
POTASSIUM SERPL-SCNC: 4 MMOL/L — SIGNIFICANT CHANGE UP (ref 3.5–5.3)
PROT SERPL-MCNC: 6.9 G/DL — SIGNIFICANT CHANGE UP (ref 6–8.3)
PROT UR-MCNC: NEGATIVE — SIGNIFICANT CHANGE UP
PROTHROM AB SERPL-ACNC: 11.8 SEC — SIGNIFICANT CHANGE UP (ref 10–12.9)
RBC # BLD: 4.1 M/UL — SIGNIFICANT CHANGE UP (ref 3.8–5.2)
RBC # FLD: 12.5 % — SIGNIFICANT CHANGE UP (ref 10.3–14.5)
RBC CASTS # UR COMP ASSIST: 1 /HPF — SIGNIFICANT CHANGE UP (ref 0–4)
RH IG SCN BLD-IMP: POSITIVE — SIGNIFICANT CHANGE UP
SODIUM SERPL-SCNC: 141 MMOL/L — SIGNIFICANT CHANGE UP (ref 135–145)
SP GR SPEC: 1.01 — LOW (ref 1.01–1.02)
SPECIMEN SOURCE: SIGNIFICANT CHANGE UP
UROBILINOGEN FLD QL: NEGATIVE — SIGNIFICANT CHANGE UP
WBC # BLD: 6.4 K/UL — SIGNIFICANT CHANGE UP (ref 3.8–10.5)
WBC # FLD AUTO: 6.4 K/UL — SIGNIFICANT CHANGE UP (ref 3.8–10.5)
WBC UR QL: 12 /HPF — HIGH (ref 0–5)

## 2019-11-21 PROCEDURE — 99284 EMERGENCY DEPT VISIT MOD MDM: CPT

## 2019-11-21 PROCEDURE — G0463: CPT

## 2019-11-21 NOTE — ED PROVIDER NOTE - NSFOLLOWUPINSTRUCTIONS_ED_ALL_ED_FT
1. TAKE ALL MEDICATIONS AS DIRECTED.  FOR PAIN YOU CAN TAKE IBUPROFEN (MOTRIN, ADVIL) OR ACETAMINOPHEN (TYLENOL) AS NEEDED, AS DIRECTED ON PACKAGING.  2. FOLLOW UP WITH NEURORADIOLOGY AS DIRECTED.  YOU WERE GIVEN COPIES OF ALL LABS AND IMAGING RESULTS FROM YOUR ER VISIT--PLEASE TAKE THEM WITH YOU TO YOUR APPOINTMENT.  3. IF NEEDED, CALL [phone] TO FIND A PRIMARY CARE PHYSICIAN.  OR CALL [phone] TO MAKE AN APPOINTMENT WITH THE MEDICAL CLINIC.  4. RETURN TO THE ER FOR ANY WORSENING SYMPTOMS.    Please rest for 24-48 hours, NO EXERTION!

## 2019-11-21 NOTE — ED ADULT NURSE NOTE - OBJECTIVE STATEMENT
Patient is a 29 year old female complaining of headache. Arrived by walk-in. Patient has history of headaches. Patient is A&O x 4. Pt reports lumbar puncture on Monday and headaches since. pt states whenever she stand or sits up she gets a headache and nausea. pt states she has painful urination. pt states three episodes of vomiting. pupils equal reactive. denies numbness/ tingling, weakness. Denies complaints of chest pain, sob, fevers, chills, n/v/d, headache, syncope, blood in urine, blood in stool. Abd is soft, non tender, non distended. Skin is warm and dry. Color is consistent with ethnicity. VSS/ NAD. Safety and comfort maintained. family at the bedside. Will continue to monitor.

## 2019-11-21 NOTE — ED PROVIDER NOTE - PHYSICAL EXAMINATION
gen: well appearing  Mentation: AAO x 3  psych: mood appropriate  ENT: airway patent  Eyes: conjunctivae clear bilaterally  Cardio: RRR, no m/r/g  Resp: normal BS b/l  GI: s/nt/nd   Neuro: +headache when sitting up and ambulting; sensation and motor function intact, CN 2-12 intact  Skin: No evidence of rash  MSK: normal movement of all extremities  Lymph/Vasc: no LE edema

## 2019-11-21 NOTE — ED PROVIDER NOTE - NS ED ROS FT
CONSTITUTIONAL: No fevers, no chills, no lightheadedness, no dizziness  Eyes: no visual changes  Ears: no ear drainage, no ear pain  Nose: no nasal congestion  Mouth/Throat: no sore throat  CV: No chest pain, no palpitations  PULM: No SOB, no cough  GI: +nausea, vomiting; no abd pain  : +dysuria, no hematuria  SKIN: no rashes.  NEURO: +headache, no focal weakness or numbness  LYMPH/VASC: no LE swelling

## 2019-11-21 NOTE — ED PROVIDER NOTE - PATIENT PORTAL LINK FT
You can access the FollowMyHealth Patient Portal offered by [facility] by registering at the following website: [email or web address]. By joining [employer]’s FollowMyHealth portal, you will also be able to view your health information using other applications (apps) compatible with our system. You can access the FollowMyHealth Patient Portal offered by [facility] by registering at the following website: [email or web address]. By joining [employer]’s FollowMyHealth portal, you will also be able to view your health information using other applications (apps) compatible with our system.

## 2019-11-21 NOTE — ED PROVIDER NOTE - OBJECTIVE STATEMENT
28 y/o F with no reported PMH presenting for ongoing headache after outpatient LP on 11/18. Patient states she has been having severe headaches when sitting or standing associated with nausea. Had a few episodes of vomiting this AM after standing up. No relief with tylenol. States she was told to come in for a blood patch.  Patient states she had LP done as part of work up for ongoing headache.

## 2019-11-21 NOTE — ED ADULT NURSE NOTE - NSIMPLEMENTINTERV_GEN_ALL_ED
Implemented All Universal Safety Interventions:  [place] to call system. Call bell, personal items and telephone within reach. Instruct patient to call for assistance. Room bathroom lighting operational. Non-slip footwear when patient is off stretcher. Physically safe environment: no spills, clutter or unnecessary equipment. Stretcher in lowest position, wheels locked, appropriate side rails in place.

## 2019-11-21 NOTE — ED PROVIDER NOTE - CLINICAL SUMMARY MEDICAL DECISION MAKING FREE TEXT BOX
30 y/o F presenting with positional headache after LP on 11/18. Likely post LP headache will contact anesthesia blood patch. Low concern for SAH given onset after LP and positional nature. Will obtain basics, UA/UC as patient has dysuria - David Clarke, DO PGY-1 30 y/o F presenting with positional headache after LP on 11/18. Likely post LP headache will contact anesthesia blood patch. Low concern for SAH given onset after LP and positional nature. Will obtain basics, UA/UC as patient has dysuria - David Clarke DO PGY-1    MARCIA Lee MD: 30 y/o F with no reported PMH presenting for ongoing headache after outpatient LP on 11/18. Patient states she has been having severe headaches when sitting or standing. Had a few episodes of vomiting this AM after standing up. No relief with tylenol. States she was told to come in for a blood patch. States LP performed by neuroradiology as part of ongoing HA workup. Plan: basic labs, consult anesthesia to offer blood patch and discuss risks/benefits, HA cocktail, IVF

## 2019-11-22 VITALS
SYSTOLIC BLOOD PRESSURE: 101 MMHG | RESPIRATION RATE: 16 BRPM | TEMPERATURE: 98 F | OXYGEN SATURATION: 98 % | DIASTOLIC BLOOD PRESSURE: 66 MMHG | HEART RATE: 70 BPM

## 2019-11-22 LAB
CULTURE RESULTS: SIGNIFICANT CHANGE UP
SPECIMEN SOURCE: SIGNIFICANT CHANGE UP

## 2019-11-22 PROCEDURE — 86901 BLOOD TYPING SEROLOGIC RH(D): CPT

## 2019-11-22 PROCEDURE — 86900 BLOOD TYPING SEROLOGIC ABO: CPT

## 2019-11-22 PROCEDURE — 86850 RBC ANTIBODY SCREEN: CPT

## 2019-11-22 PROCEDURE — 85027 COMPLETE CBC AUTOMATED: CPT

## 2019-11-22 PROCEDURE — 81001 URINALYSIS AUTO W/SCOPE: CPT

## 2019-11-22 PROCEDURE — 85730 THROMBOPLASTIN TIME PARTIAL: CPT

## 2019-11-22 PROCEDURE — 99283 EMERGENCY DEPT VISIT LOW MDM: CPT

## 2019-11-22 PROCEDURE — 80053 COMPREHEN METABOLIC PANEL: CPT

## 2019-11-22 PROCEDURE — 87086 URINE CULTURE/COLONY COUNT: CPT

## 2019-11-22 PROCEDURE — 84702 CHORIONIC GONADOTROPIN TEST: CPT

## 2019-11-22 PROCEDURE — 85610 PROTHROMBIN TIME: CPT

## 2019-11-22 RX ORDER — ACETAMINOPHEN 500 MG
650 TABLET ORAL ONCE
Refills: 0 | Status: COMPLETED | OUTPATIENT
Start: 2019-11-22 | End: 2019-11-22

## 2019-11-22 RX ADMIN — Medication 650 MILLIGRAM(S): at 01:33

## 2019-11-22 NOTE — END OF VISIT
[] : Resident [FreeTextEntry3] : 29F w/ post-LP HA, debilitating - can not sit up without HA, started with high volume LP done 3 days ago. High volume LP done although pt w/ normal opening pressure (18.5). Has already tried tylenol, NSAIDS. Will need to work tomorrow / Saturday. Advised to go to ED for IVF and consideration for EBP

## 2019-11-22 NOTE — PHYSICAL EXAM
[Well Developed] : well developed [Normal Sclera/Conjunctiva] : normal sclera/conjunctiva [PERRL] : pupils equal round and reactive to light [Normal Oropharynx] : the oropharynx was normal [No Lymphadenopathy] : no lymphadenopathy [Supple] : supple [No Respiratory Distress] : no respiratory distress  [No Accessory Muscle Use] : no accessory muscle use [Clear to Auscultation] : lungs were clear to auscultation bilaterally [Normal Rate] : normal rate  [Regular Rhythm] : with a regular rhythm [Normal S1, S2] : normal S1 and S2 [Pedal Pulses Present] : the pedal pulses are present [No Edema] : there was no peripheral edema [Soft] : abdomen soft [Non Tender] : non-tender [No Focal Deficits] : no focal deficits [Normal Affect] : the affect was normal [Normal Mood] : the mood was normal [de-identified] : Mildly uncomfortable

## 2019-11-22 NOTE — REVIEW OF SYSTEMS
[Vision Problems] : vision problems [Headache] : headache [Dizziness] : dizziness [Fever] : no fever [Fatigue] : no fatigue [Pain] : no pain [Earache] : no earache [Hearing Loss] : no hearing loss [Nosebleed] : no nosebleeds [Chest Pain] : no chest pain [Palpitations] : no palpitations [Orthopnea] : no orthopnea [Shortness Of Breath] : no shortness of breath [Lower Ext Edema] : no lower extremity edema [Wheezing] : no wheezing [Dyspnea on Exertion] : no dyspnea on exertion [Cough] : no cough [Abdominal Pain] : no abdominal pain [Constipation] : no constipation [Nausea] : no nausea [Vomiting] : no vomiting [Diarrhea] : diarrhea [Joint Stiffness] : no joint stiffness [Frequency] : no frequency [Dysuria] : no dysuria [Muscle Pain] : no muscle pain [Muscle Weakness] : no muscle weakness [Back Pain] : no back pain [Itching] : no itching [Skin Rash] : no skin rash [Fainting] : no fainting [Confusion] : no confusion

## 2019-11-22 NOTE — ASSESSMENT
[FreeTextEntry1] : This is a 29 year old female with hx of allergic rhinitis and gastritis who presents for acute visit. \par \par #Headache: post-LP headache, debilitating. Low suspicion for other intracranial process given normal neurologic and eye exam. \par - encourage continue hydration and ATC motrin\par - discussed option including trial of antiemetics & high dose of motrin for symptom control vs. ED for aggressive hydration and pain control, patient prefer to go to the ED for further care.\par \par To go to ED for pain control and hydration.\par \par RTO in 6 month for CPE or earlier PRN

## 2019-11-22 NOTE — HISTORY OF PRESENT ILLNESS
[FreeTextEntry8] : This is a 29 year old female with hx of allergic rhinitis and gastritis who presents for acute visit. \par \par Bifrontal and occipital headache with associated dizziness (room spinning) since LP on 11/18. Unable to sitting up or stand up without dizziness/headache. Missed work on multiple days as well. \par Started experiencing nausea with BNBN emesis x multiple times since Tuesday. 1 episode of NBNB emesis in the office.\par Denies fever, chill, blurry vision, ear ache, n/v/c/d, CP, SOB or urinary concerns. \par Had h/o headache in the past, suspected by neuro to be 2/2 IIH, s/p LP, opening pressure was 18.5cm water, and 3 after the tap.

## 2019-11-22 NOTE — ED ADULT NURSE REASSESSMENT NOTE - NS ED NURSE REASSESS COMMENT FT1
pt has post 45 mins VSS. pt denies any headache, nausea. pt has back pain from insertion site of Blood patch. cant score pain, " just sore". will cont to monitor for another hr.
pt received blood patch performed anesthesia. pt to lay flat for 45 min as per team, pt denies headache/ nausea. vital signs stable.

## 2019-11-24 ENCOUNTER — FORM ENCOUNTER (OUTPATIENT)
Age: 29
End: 2019-11-24

## 2019-11-25 ENCOUNTER — OUTPATIENT (OUTPATIENT)
Dept: OUTPATIENT SERVICES | Facility: HOSPITAL | Age: 29
LOS: 1 days | End: 2019-11-25
Payer: SELF-PAY

## 2019-11-25 ENCOUNTER — APPOINTMENT (OUTPATIENT)
Dept: MRI IMAGING | Facility: CLINIC | Age: 29
End: 2019-11-25
Payer: COMMERCIAL

## 2019-11-25 DIAGNOSIS — R51 HEADACHE: ICD-10-CM

## 2019-11-25 DIAGNOSIS — I10 ESSENTIAL (PRIMARY) HYPERTENSION: ICD-10-CM

## 2019-11-25 DIAGNOSIS — O00.90 UNSPECIFIED ECTOPIC PREGNANCY WITHOUT INTRAUTERINE PREGNANCY: Chronic | ICD-10-CM

## 2019-11-25 PROBLEM — Z78.9 OTHER SPECIFIED HEALTH STATUS: Chronic | Status: ACTIVE | Noted: 2019-11-21

## 2019-11-25 PROCEDURE — 70551 MRI BRAIN STEM W/O DYE: CPT | Mod: 26

## 2019-11-25 PROCEDURE — 70551 MRI BRAIN STEM W/O DYE: CPT

## 2019-11-27 DIAGNOSIS — R51 HEADACHE: ICD-10-CM

## 2019-12-03 ENCOUNTER — APPOINTMENT (OUTPATIENT)
Dept: GASTROENTEROLOGY | Facility: HOSPITAL | Age: 29
End: 2019-12-03

## 2019-12-03 ENCOUNTER — OUTPATIENT (OUTPATIENT)
Dept: OUTPATIENT SERVICES | Facility: HOSPITAL | Age: 29
LOS: 1 days | End: 2019-12-03
Payer: SELF-PAY

## 2019-12-03 VITALS
RESPIRATION RATE: 16 BRPM | WEIGHT: 144 LBS | HEART RATE: 72 BPM | SYSTOLIC BLOOD PRESSURE: 108 MMHG | DIASTOLIC BLOOD PRESSURE: 70 MMHG | HEIGHT: 64 IN | BODY MASS INDEX: 24.59 KG/M2

## 2019-12-03 DIAGNOSIS — R10.9 UNSPECIFIED ABDOMINAL PAIN: ICD-10-CM

## 2019-12-03 DIAGNOSIS — R10.816 EPIGASTRIC ABDOMINAL TENDERNESS: ICD-10-CM

## 2019-12-03 DIAGNOSIS — O00.90 UNSPECIFIED ECTOPIC PREGNANCY WITHOUT INTRAUTERINE PREGNANCY: Chronic | ICD-10-CM

## 2019-12-03 PROCEDURE — G0463: CPT

## 2019-12-03 NOTE — ASSESSMENT
[FreeTextEntry1] : Impression:\par # Heartburn, epigastric pain\par # Headaches\par # Average risk for colon cancer\par \par Recs:\par - will plan for upper endoscopy for H. pylori biospy and evaluation for PUD \par - Benefits and risks (including pain, infection, bleeding, perforation, and risks of anesthesia) explained to the patient.  Patient agreeable to proceed.\par - start Pepcid at night for heartburn\par - small meals, advised not to eat for 2-3 prior to lying down\par - RTC 3 months

## 2019-12-03 NOTE — HISTORY OF PRESENT ILLNESS
[de-identified] : 28yo F with history of headaches, ectopic pregnancy, who presents for initial consultation with abdominal pain and heartburn for 1 year. She states she has gone to the ED several times for this.  She has been treated empirically for H. pylori about 6 months ago.  She is not taking any medications for this. She feels the pain prior to eating and feels that eating does help relieve the pain.  She does not have RUQ abdominal pain. Normal bowel movements. Fam hx + gastric ulcers and colon cancer in maternal uncle.

## 2019-12-03 NOTE — PHYSICAL EXAM
[General Appearance - Alert] : alert [General Appearance - In No Acute Distress] : in no acute distress [Sclera] : the sclera and conjunctiva were normal [Outer Ear] : the ears and nose were normal in appearance [Respiration, Rhythm And Depth] : normal respiratory rhythm and effort [Apical Impulse] : the apical impulse was normal [Heart Rate And Rhythm] : heart rate was normal and rhythm regular [Bowel Sounds] : normal bowel sounds [Abdomen Soft] : soft [Abdomen Tenderness] : non-tender [Involuntary Movements] : no involuntary movements were seen [] : no hepato-splenomegaly [No Focal Deficits] : no focal deficits [Oriented To Time, Place, And Person] : oriented to person, place, and time [Impaired Insight] : insight and judgment were intact

## 2019-12-05 ENCOUNTER — OUTPATIENT (OUTPATIENT)
Dept: OUTPATIENT SERVICES | Facility: HOSPITAL | Age: 29
LOS: 1 days | End: 2019-12-05
Payer: SELF-PAY

## 2019-12-05 ENCOUNTER — RESULT REVIEW (OUTPATIENT)
Age: 29
End: 2019-12-05

## 2019-12-05 DIAGNOSIS — R10.816 EPIGASTRIC ABDOMINAL TENDERNESS: ICD-10-CM

## 2019-12-05 DIAGNOSIS — O00.90 UNSPECIFIED ECTOPIC PREGNANCY WITHOUT INTRAUTERINE PREGNANCY: Chronic | ICD-10-CM

## 2019-12-05 PROCEDURE — 88305 TISSUE EXAM BY PATHOLOGIST: CPT | Mod: 26

## 2019-12-05 PROCEDURE — 43239 EGD BIOPSY SINGLE/MULTIPLE: CPT | Mod: GC

## 2019-12-05 PROCEDURE — 88312 SPECIAL STAINS GROUP 1: CPT | Mod: 26

## 2019-12-05 PROCEDURE — 88312 SPECIAL STAINS GROUP 1: CPT

## 2019-12-05 PROCEDURE — 43239 EGD BIOPSY SINGLE/MULTIPLE: CPT

## 2019-12-05 PROCEDURE — 88305 TISSUE EXAM BY PATHOLOGIST: CPT

## 2019-12-09 LAB — SURGICAL PATHOLOGY STUDY: SIGNIFICANT CHANGE UP

## 2019-12-10 ENCOUNTER — APPOINTMENT (OUTPATIENT)
Dept: NEUROLOGY | Facility: HOSPITAL | Age: 29
End: 2019-12-10
Payer: COMMERCIAL

## 2019-12-10 ENCOUNTER — OUTPATIENT (OUTPATIENT)
Dept: OUTPATIENT SERVICES | Facility: HOSPITAL | Age: 29
LOS: 1 days | End: 2019-12-10
Payer: SELF-PAY

## 2019-12-10 VITALS
SYSTOLIC BLOOD PRESSURE: 107 MMHG | RESPIRATION RATE: 14 BRPM | DIASTOLIC BLOOD PRESSURE: 67 MMHG | HEART RATE: 70 BPM | HEIGHT: 64 IN | WEIGHT: 143 LBS | BODY MASS INDEX: 24.41 KG/M2

## 2019-12-10 DIAGNOSIS — O00.90 UNSPECIFIED ECTOPIC PREGNANCY WITHOUT INTRAUTERINE PREGNANCY: Chronic | ICD-10-CM

## 2019-12-10 DIAGNOSIS — R51 HEADACHE: ICD-10-CM

## 2019-12-10 PROCEDURE — 99214 OFFICE O/P EST MOD 30 MIN: CPT | Mod: GC

## 2019-12-10 PROCEDURE — G0463: CPT

## 2019-12-10 NOTE — HISTORY OF PRESENT ILLNESS
[FreeTextEntry1] :  ID: [id number]\par \par Interval History: 28 y/o female with no past medical history presenting to Neurology clinic for follow-up. Previously seen on 11/12 with headaches and neck pain.\par Patient states the headaches have been going on for over 2 years and describes them as sharp rated 8/10 in severity occurring about 2-3x a week lasting about 30 minutes and resolve on their own. Denies nausea, vomiting associated with headaches. Denies photophobia or phonophobia and denies double vision. Admits to accompanying ringing in the ears described as a constant high pitched ringing. Reports the headaches have woken her up from sleep. Denies associated with menstrual cycle. \par Denies OCP use or hormone replacement. Currently trying to conceive with partner and removed IUD 2 months ago. Denies smoking history. \par Reports on 3x occasions had episode of momentary darkened vision when going from seated to standing position lasting a few seconds which resolved on its own. Reports transient associated blurry vision with the headaches that resolves after blinking a few times in both eyes. \par Headaches started one year after having daughter in 2012 and then stopped after one year. In the past 2 years, they have started to come back. \par Patient reports fall at work in which she fell backwards without head trauma and no loss of consciousness but notable neck pain after the fall. \par Hasn't seen eye doctor since the headaches started. \par

## 2019-12-10 NOTE — PHYSICAL EXAM
[General Appearance - Alert] : alert [General Appearance - In No Acute Distress] : in no acute distress [Oriented To Time, Place, And Person] : oriented to person, place, and time [Person] : oriented to person [Place] : oriented to place [Time] : oriented to time [Short Term Intact] : short term memory intact [I: Normal Smell] : smell intact bilaterally [Cranial Nerves Optic (II)] : visual acuity intact bilaterally,  visual fields full to confrontation, pupils equal round and reactive to light [Cranial Nerves Oculomotor (III)] : extraocular motion intact [Cranial Nerves Trigeminal (V)] : facial sensation intact symmetrically [Cranial Nerves Facial (VII)] : face symmetrical [Cranial Nerves Vestibulocochlear (VIII)] : hearing was intact bilaterally [Cranial Nerves Glossopharyngeal (IX)] : tongue and palate midline [Cranial Nerves Accessory (XI - Cranial And Spinal)] : head turning and shoulder shrug symmetric [Cranial Nerves Hypoglossal (XII)] : there was no tongue deviation with protrusion [Motor Strength] : muscle strength was normal in all four extremities [Motor Tone] : muscle tone was normal in all four extremities [Motor Handedness Right-Handed] : the patient is right hand dominant [Sensation Tactile Decrease] : light touch was intact [Sensation Pain / Temperature Decrease] : pain and temperature was intact [Balance] : balance was intact [2+] : Ankle jerk left 2+ [Sclera] : the sclera and conjunctiva were normal [PERRL With Normal Accommodation] : pupils were equal in size, round, reactive to light, with normal accommodation [Optic Disc Abnormality] : the optic disc were normal in size and color [Extraocular Movements] : extraocular movements were intact [Full Visual Field] : full visual field [Abnormal Walk] : normal gait [Paresis Pronator Drift Right-Sided] : no pronator drift on the right [Paresis Pronator Drift Left-Sided] : no pronator drift on the left

## 2019-12-10 NOTE — DATA REVIEWED
[de-identified] : LP: Unremarkable with opening pressure of 18.5 and closing pressure of 3.5.\par Glucose 57, protein 34, WBC 1.  [de-identified] : 11/2019: Unremarkable

## 2019-12-10 NOTE — DISCUSSION/SUMMARY
[FreeTextEntry1] : 28 y/o female with no past medical history presenting to Neurology clinic for follow-up. Previously seen on 11/12 with headaches and neck pain.\par Patient states the headaches have been going on for over 2 years and describes them as sharp rated 8/10 in severity occurring about 2-3x a week localized to the bitemporal region with associated eye pain lasting about 30 minutes and resolve on their own. Denies nausea, vomiting associated with headaches. Denies photophobia or phonophobia and denies double vision. Admits to accompanying ringing in the ears described as a constant high pitched ringing. Reports the headaches have woken her up from sleep. Denies association with menstrual cycle. \par Denies OCP use or hormone replacement. Currently trying to conceive with partner and removed IUD 2 months ago. Denies smoking history. Reports on 3x occasions had episode of momentary darkened vision when going from seated to standing position lasting a few seconds which resolved on its own. Reports transient associated blurry vision with the headaches that resolves after blinking a few times in both eyes. \par Patient reports fall at work in which she fell backwards without head trauma and no loss of consciousness but notable neck pain after the fall. \par Neurological exam and eye exam are unremarkable. MRI brain 11/2019 unremarkable. LP unremarkable with opening pressure of 18.5cm. \par \par Impression: Tension type headache; IIH ruled out; intracranial pathology ruled out\par \par Plan: \par [] Tylenol PRN as abortive agent\par [] Ophthalmology evaluation \par [] RTC in 6 months

## 2020-01-09 ENCOUNTER — LABORATORY RESULT (OUTPATIENT)
Age: 30
End: 2020-01-09

## 2020-01-09 ENCOUNTER — OUTPATIENT (OUTPATIENT)
Dept: OUTPATIENT SERVICES | Facility: HOSPITAL | Age: 30
LOS: 1 days | End: 2020-01-09
Payer: SELF-PAY

## 2020-01-09 ENCOUNTER — APPOINTMENT (OUTPATIENT)
Dept: OBGYN | Facility: CLINIC | Age: 30
End: 2020-01-09
Payer: COMMERCIAL

## 2020-01-09 VITALS — WEIGHT: 143 LBS | DIASTOLIC BLOOD PRESSURE: 70 MMHG | SYSTOLIC BLOOD PRESSURE: 110 MMHG | BODY MASS INDEX: 24.55 KG/M2

## 2020-01-09 DIAGNOSIS — Z11.3 ENCOUNTER FOR SCREENING FOR INFECTIONS WITH A PREDOMINANTLY SEXUAL MODE OF TRANSMISSION: ICD-10-CM

## 2020-01-09 DIAGNOSIS — O00.90 UNSPECIFIED ECTOPIC PREGNANCY WITHOUT INTRAUTERINE PREGNANCY: Chronic | ICD-10-CM

## 2020-01-09 DIAGNOSIS — N76.0 ACUTE VAGINITIS: ICD-10-CM

## 2020-01-09 PROCEDURE — 99213 OFFICE O/P EST LOW 20 MIN: CPT | Mod: NC

## 2020-01-09 NOTE — HISTORY OF PRESENT ILLNESS
[Definite:  ___ (Date)] : the last menstrual period was [unfilled] [Normal Amount/Duration] : was of a normal amount and duration [Sexually Active] : is sexually active [Monogamous] : is monogamous [Contraception] : uses contraception [Male ___] : [unfilled] male [Spotting Between  Menses] : no spotting between menses

## 2020-01-09 NOTE — PHYSICAL EXAM
[Normal] : uterus [No Bleeding] : there was no active vaginal bleeding [Normal Position] : in a normal position [Uterine Adnexae] : were not tender and not enlarged [No Tenderness] : no rectal tenderness [Nl Sphincter Tone] : normal sphincter tone [Discharge] : had no discharge [Pap Obtained] : a Pap smear was not performed [Motion Tenderness] : there was no cervical motion tenderness [Tenderness] : nontender [Adnexa Tenderness] : were not tender [Mass ___ cm] : no uterine mass was palpated [Enlarged ___ wks] : not enlarged [Ovarian Mass (___ Cm)] : there were no adnexal masses

## 2020-01-10 PROCEDURE — 87591 N.GONORRHOEAE DNA AMP PROB: CPT

## 2020-01-10 PROCEDURE — G0463: CPT

## 2020-01-10 PROCEDURE — 81003 URINALYSIS AUTO W/O SCOPE: CPT

## 2020-01-10 PROCEDURE — 87491 CHLMYD TRACH DNA AMP PROBE: CPT

## 2020-01-14 ENCOUNTER — APPOINTMENT (OUTPATIENT)
Dept: OPHTHALMOLOGY | Facility: CLINIC | Age: 30
End: 2020-01-14

## 2020-01-15 DIAGNOSIS — Z11.3 ENCOUNTER FOR SCREENING FOR INFECTIONS WITH A PREDOMINANTLY SEXUAL MODE OF TRANSMISSION: ICD-10-CM

## 2020-01-15 DIAGNOSIS — R10.2 PELVIC AND PERINEAL PAIN: ICD-10-CM

## 2020-02-24 ENCOUNTER — APPOINTMENT (OUTPATIENT)
Dept: OPHTHALMOLOGY | Facility: CLINIC | Age: 30
End: 2020-02-24

## 2020-03-12 ENCOUNTER — LABORATORY RESULT (OUTPATIENT)
Age: 30
End: 2020-03-12

## 2020-03-13 ENCOUNTER — OUTPATIENT (OUTPATIENT)
Dept: OUTPATIENT SERVICES | Facility: HOSPITAL | Age: 30
LOS: 1 days | End: 2020-03-13
Payer: SELF-PAY

## 2020-03-13 ENCOUNTER — APPOINTMENT (OUTPATIENT)
Dept: OBGYN | Facility: CLINIC | Age: 30
End: 2020-03-13
Payer: COMMERCIAL

## 2020-03-13 VITALS — SYSTOLIC BLOOD PRESSURE: 110 MMHG | DIASTOLIC BLOOD PRESSURE: 68 MMHG | WEIGHT: 148 LBS | BODY MASS INDEX: 25.4 KG/M2

## 2020-03-13 DIAGNOSIS — N76.0 ACUTE VAGINITIS: ICD-10-CM

## 2020-03-13 DIAGNOSIS — O00.90 UNSPECIFIED ECTOPIC PREGNANCY WITHOUT INTRAUTERINE PREGNANCY: Chronic | ICD-10-CM

## 2020-03-13 DIAGNOSIS — R10.2 PELVIC AND PERINEAL PAIN: ICD-10-CM

## 2020-03-13 PROCEDURE — 87491 CHLMYD TRACH DNA AMP PROBE: CPT

## 2020-03-13 PROCEDURE — 87591 N.GONORRHOEAE DNA AMP PROB: CPT

## 2020-03-13 PROCEDURE — G0463: CPT

## 2020-03-13 PROCEDURE — 99214 OFFICE O/P EST MOD 30 MIN: CPT | Mod: NC

## 2020-03-13 NOTE — PHYSICAL EXAM
[Normal] : uterus [Labia Majora] : labia major [Labia Minora] : labia minora [No Bleeding] : there was no active vaginal bleeding [Pap Obtained] : a Pap smear was not performed [Motion Tenderness] : there was no cervical motion tenderness [Uterine Adnexae] : were not tender and not enlarged

## 2020-03-13 NOTE — HISTORY OF PRESENT ILLNESS
[Last Pap ___] : Last cervical pap smear was [unfilled] [Reproductive Age] : is of reproductive age [Menstrual Problems] : reports normal menses [Pregnancy History] : pregnancy history: [Lower-Rt-Q] : lower right quadrant [Suprapubic] : suprapubic area [Lower-Lt-Q] : left lower quadrant [Continuous] : no continuous [FreeTextEntry8] : pulling pain [Definite:  ___ (Date)] : the last menstrual period was [unfilled] [Normal Amount/Duration] : was abnormal [Spotting Between  Menses] : no spotting between menses [Regular Cycle Intervals] : periods have been regular [Menstrual Cramps] : menstrual cramps [Sexually Active] : is sexually active [Monogamous] : is monogamous [Contraception] : does not use contraception

## 2020-06-19 ENCOUNTER — APPOINTMENT (OUTPATIENT)
Dept: INTERNAL MEDICINE | Facility: CLINIC | Age: 30
End: 2020-06-19

## 2020-06-19 DIAGNOSIS — L70.9 ACNE, UNSPECIFIED: ICD-10-CM

## 2020-06-19 DIAGNOSIS — R21 RASH AND OTHER NONSPECIFIC SKIN ERUPTION: ICD-10-CM

## 2020-06-22 NOTE — HISTORY OF PRESENT ILLNESS
[Home] : at home, [unfilled] , at the time of the visit. [Other Location: e.g. Home (Enter Location, City,State)___] : at [unfilled] [Verbal consent obtained from patient] : the patient, [unfilled] [FreeTextEntry8] : 30 y o F with history of acne, tension headaches, GERD, having audio and visual appt for new black spots on her skin. Patient noticed clusters of small black subcentimeter nodules on the front of the neck, on her right hand, on her feet, x 4 days. Patient has been outside although without bites or otherwise extreme sun exposure. She has been to [place]. No other sick contacts. No new detergents or soaps. Denies erythema, fever chills, tenderness, pruritis. Of note she did start taking isotretinoin 7 days ago no other new medications. Importantly, she does have a positive family history for skin cancer in grand father and melanoma in grandmother.

## 2020-06-22 NOTE — PLAN
[FreeTextEntry1] : #acne\par recently started on isoretinoin for 7 days\par given acure reaction and that patient is actively trying to get pregnant told patient to not take anymore\par will follow up with derm \par \par #skin rash\par possible acne vs skin reaction to new medication \par however given family history of melanoma and skin cancer and concerning lesion on dorsum of hand should get skin biopsy\par will refer to dermatology at this time\par instructed to see a provider urgently if any systemic symptoms appear\par she should call back after scheduling a derm appointment for the residents to possibly expedite it\par will obtain CBC, CMP, ESR, CRP\par \par case d/w Dr. Mansfield\par \par Jose Eduardo Chapman PGY1\par time spent: 27 minutes

## 2020-06-22 NOTE — PHYSICAL EXAM
[No Acute Distress] : no acute distress [Well Nourished] : well nourished [Well Developed] : well developed [PERRL] : pupils equal round and reactive to light [Normal Sclera/Conjunctiva] : normal sclera/conjunctiva [Well-Appearing] : well-appearing [Normal Outer Ear/Nose] : the outer ears and nose were normal in appearance [EOMI] : extraocular movements intact [Normal Oropharynx] : the oropharynx was normal [No Respiratory Distress] : no respiratory distress  [No Accessory Muscle Use] : no accessory muscle use [No Varicosities] : no varicosities [No Edema] : there was no peripheral edema [No Extremity Clubbing/Cyanosis] : no extremity clubbing/cyanosis [Non-distended] : non-distended [No Masses] : no abdominal mass palpated [No Joint Swelling] : no joint swelling [Acne] : acne [No Focal Deficits] : no focal deficits [Coordination Grossly Intact] : coordination grossly intact [Normal Affect] : the affect was normal [Deep Tendon Reflexes (DTR)] : deep tendon reflexes were 2+ and symmetric [Normal Insight/Judgement] : insight and judgment were intact [de-identified] : nodular acne on the face, mutliple clusters of subcentimer discrete black nodules, non pururitic, non tender, non erthematous on the chest, fron ilene neck, arms hands, feet, also has a separate confluent rash on dorsum of right hand

## 2020-06-22 NOTE — ASSESSMENT
[FreeTextEntry1] : 31 yo F with PMH of tension headache, acne, GERD p/w new black skin nodules on multiple areas of skin x4days with fam hx of skin cancer

## 2020-06-24 ENCOUNTER — APPOINTMENT (OUTPATIENT)
Dept: OBGYN | Facility: CLINIC | Age: 30
End: 2020-06-24
Payer: COMMERCIAL

## 2020-06-24 ENCOUNTER — OUTPATIENT (OUTPATIENT)
Dept: OUTPATIENT SERVICES | Facility: HOSPITAL | Age: 30
LOS: 1 days | End: 2020-06-24
Payer: SELF-PAY

## 2020-06-24 VITALS — BODY MASS INDEX: 25.06 KG/M2 | SYSTOLIC BLOOD PRESSURE: 122 MMHG | DIASTOLIC BLOOD PRESSURE: 80 MMHG | WEIGHT: 146 LBS

## 2020-06-24 DIAGNOSIS — R10.2 PELVIC AND PERINEAL PAIN: ICD-10-CM

## 2020-06-24 DIAGNOSIS — Z86.19 PERSONAL HISTORY OF OTHER INFECTIOUS AND PARASITIC DISEASES: ICD-10-CM

## 2020-06-24 DIAGNOSIS — O00.90 UNSPECIFIED ECTOPIC PREGNANCY WITHOUT INTRAUTERINE PREGNANCY: Chronic | ICD-10-CM

## 2020-06-24 PROCEDURE — G0463: CPT

## 2020-06-24 PROCEDURE — 99213 OFFICE O/P EST LOW 20 MIN: CPT | Mod: NC

## 2020-06-24 NOTE — PHYSICAL EXAM
[Labia Majora] : labia major [Labia Minora] : labia minora [Normal Position] : in a normal position [Normal] : clitoris [No Bleeding] : there was no active vaginal bleeding [Enlarged ___ wks] : not enlarged [Tenderness] : nontender [Uterine Adnexae] : were not tender and not enlarged [Mass ___ cm] : no uterine mass was palpated [Adnexa Tenderness] : were not tender [Ovarian Mass (___ Cm)] : there were no adnexal masses [Nl Sphincter Tone] : normal sphincter tone [No Tenderness] : no rectal tenderness [FreeTextEntry5] : Thick white curdy d/c C/w  Yeast infection

## 2020-06-25 DIAGNOSIS — B37.3 CANDIDIASIS OF VULVA AND VAGINA: ICD-10-CM

## 2020-06-25 DIAGNOSIS — R10.2 PELVIC AND PERINEAL PAIN: ICD-10-CM

## 2020-06-25 DIAGNOSIS — Z30.9 ENCOUNTER FOR CONTRACEPTIVE MANAGEMENT, UNSPECIFIED: ICD-10-CM

## 2020-06-29 ENCOUNTER — OUTPATIENT (OUTPATIENT)
Dept: OUTPATIENT SERVICES | Facility: HOSPITAL | Age: 30
LOS: 1 days | End: 2020-06-29

## 2020-06-29 DIAGNOSIS — Z30.9 ENCOUNTER FOR CONTRACEPTIVE MANAGEMENT, UNSPECIFIED: ICD-10-CM

## 2020-06-29 DIAGNOSIS — O00.90 UNSPECIFIED ECTOPIC PREGNANCY WITHOUT INTRAUTERINE PREGNANCY: Chronic | ICD-10-CM

## 2020-06-29 DIAGNOSIS — R10.2 PELVIC AND PERINEAL PAIN: ICD-10-CM

## 2020-06-29 DIAGNOSIS — B37.3 CANDIDIASIS OF VULVA AND VAGINA: ICD-10-CM

## 2020-06-29 RX ORDER — TERCONAZOLE 4 MG/G
0.4 CREAM VAGINAL
Qty: 1 | Refills: 1 | Status: ACTIVE | COMMUNITY
Start: 2020-06-29 | End: 1900-01-01

## 2020-07-01 DIAGNOSIS — Z30.9 ENCOUNTER FOR CONTRACEPTIVE MANAGEMENT, UNSPECIFIED: ICD-10-CM

## 2020-07-06 ENCOUNTER — OUTPATIENT (OUTPATIENT)
Dept: OUTPATIENT SERVICES | Facility: HOSPITAL | Age: 30
LOS: 1 days | End: 2020-07-06

## 2020-07-06 ENCOUNTER — APPOINTMENT (OUTPATIENT)
Dept: ULTRASOUND IMAGING | Facility: CLINIC | Age: 30
End: 2020-07-06

## 2020-07-06 DIAGNOSIS — Z30.9 ENCOUNTER FOR CONTRACEPTIVE MANAGEMENT, UNSPECIFIED: ICD-10-CM

## 2020-07-06 DIAGNOSIS — B37.3 CANDIDIASIS OF VULVA AND VAGINA: ICD-10-CM

## 2020-07-06 DIAGNOSIS — R10.2 PELVIC AND PERINEAL PAIN: ICD-10-CM

## 2020-07-06 DIAGNOSIS — O00.90 UNSPECIFIED ECTOPIC PREGNANCY WITHOUT INTRAUTERINE PREGNANCY: Chronic | ICD-10-CM

## 2020-07-14 ENCOUNTER — APPOINTMENT (OUTPATIENT)
Dept: GASTROENTEROLOGY | Facility: HOSPITAL | Age: 30
End: 2020-07-14

## 2020-07-22 ENCOUNTER — APPOINTMENT (OUTPATIENT)
Dept: OBGYN | Facility: CLINIC | Age: 30
End: 2020-07-22

## 2020-07-27 ENCOUNTER — LABORATORY RESULT (OUTPATIENT)
Age: 30
End: 2020-07-27

## 2020-07-27 ENCOUNTER — OUTPATIENT (OUTPATIENT)
Dept: OUTPATIENT SERVICES | Facility: HOSPITAL | Age: 30
LOS: 1 days | End: 2020-07-27
Payer: SELF-PAY

## 2020-07-27 ENCOUNTER — APPOINTMENT (OUTPATIENT)
Dept: OBGYN | Facility: CLINIC | Age: 30
End: 2020-07-27
Payer: COMMERCIAL

## 2020-07-27 VITALS — BODY MASS INDEX: 24.89 KG/M2 | DIASTOLIC BLOOD PRESSURE: 74 MMHG | SYSTOLIC BLOOD PRESSURE: 110 MMHG | WEIGHT: 145 LBS

## 2020-07-27 DIAGNOSIS — O00.90 UNSPECIFIED ECTOPIC PREGNANCY WITHOUT INTRAUTERINE PREGNANCY: Chronic | ICD-10-CM

## 2020-07-27 DIAGNOSIS — N76.0 ACUTE VAGINITIS: ICD-10-CM

## 2020-07-27 PROCEDURE — 99213 OFFICE O/P EST LOW 20 MIN: CPT | Mod: GE

## 2020-07-27 PROCEDURE — G0463: CPT

## 2020-07-27 RX ORDER — NORELGESTROMIN AND ETHINYL ESTRADIOL 150; 35 UG/D; UG/D
150-35 PATCH TRANSDERMAL
Qty: 3 | Refills: 6 | Status: DISCONTINUED | COMMUNITY
Start: 2020-07-01 | End: 2020-07-27

## 2020-07-27 RX ORDER — CLOTRIMAZOLE 10 MG/G
1 CREAM TOPICAL
Refills: 0 | Status: DISCONTINUED | COMMUNITY
End: 2020-07-27

## 2020-07-27 RX ORDER — CLOTRIMAZOLE 10 MG/G
1 CREAM TOPICAL 3 TIMES DAILY
Qty: 1 | Refills: 1 | Status: DISCONTINUED | COMMUNITY
Start: 2019-08-12 | End: 2020-07-27

## 2020-07-29 LAB — CYTOLOGY SPEC DOC CYTO: SIGNIFICANT CHANGE UP

## 2020-07-30 NOTE — PHYSICAL EXAM
[No Lesions] : no genitalia lesions [Normal] : uterus [Labia Majora] : labia major [Scant] : there was scant vaginal bleeding [Pap Obtained] : a Pap smear was performed [Retroversion] : retroverted [Uterine Adnexae] : were not tender and not enlarged [Erythema] : no erythema [Atrophy] : no atrophy [Labia Majora Erythema] : no erythema of the labia majora [Discharge] : had no discharge [Motion Tenderness] : there was no cervical motion tenderness [Enlarged ___ wks] : not enlarged [Tenderness] : nontender [Mass ___ cm] : no uterine mass was palpated

## 2020-07-31 RX ORDER — ETONOGESTREL AND ETHINYL ESTRADIOL 11.7; 2.7 MG/1; MG/1
0.12-0.015 INSERT, EXTENDED RELEASE VAGINAL
Qty: 1 | Refills: 5 | Status: ACTIVE | COMMUNITY
Start: 2020-07-27 | End: 1900-01-01

## 2020-08-10 DIAGNOSIS — N94.0 MITTELSCHMERZ: ICD-10-CM

## 2020-08-10 DIAGNOSIS — Z30.9 ENCOUNTER FOR CONTRACEPTIVE MANAGEMENT, UNSPECIFIED: ICD-10-CM

## 2020-08-24 ENCOUNTER — OUTPATIENT (OUTPATIENT)
Dept: OUTPATIENT SERVICES | Facility: HOSPITAL | Age: 30
LOS: 1 days | End: 2020-08-24
Payer: SELF-PAY

## 2020-08-24 ENCOUNTER — RESULT REVIEW (OUTPATIENT)
Age: 30
End: 2020-08-24

## 2020-08-24 ENCOUNTER — APPOINTMENT (OUTPATIENT)
Dept: ULTRASOUND IMAGING | Facility: CLINIC | Age: 30
End: 2020-08-24
Payer: COMMERCIAL

## 2020-08-24 DIAGNOSIS — Z30.9 ENCOUNTER FOR CONTRACEPTIVE MANAGEMENT, UNSPECIFIED: ICD-10-CM

## 2020-08-24 DIAGNOSIS — R10.2 PELVIC AND PERINEAL PAIN: ICD-10-CM

## 2020-08-24 DIAGNOSIS — O00.90 UNSPECIFIED ECTOPIC PREGNANCY WITHOUT INTRAUTERINE PREGNANCY: Chronic | ICD-10-CM

## 2020-08-24 DIAGNOSIS — B37.3 CANDIDIASIS OF VULVA AND VAGINA: ICD-10-CM

## 2020-08-24 PROCEDURE — 76856 US EXAM PELVIC COMPLETE: CPT

## 2020-08-24 PROCEDURE — 76856 US EXAM PELVIC COMPLETE: CPT | Mod: 26

## 2020-10-07 NOTE — ED ADULT NURSE NOTE - NSFALLRSKPASTHIST_ED_ALL_ED
no Opioid Pregnancy And Lactation Text: These medications can lead to premature delivery and should be avoided during pregnancy. These medications are also present in breast milk in small amounts.

## 2020-11-24 ENCOUNTER — EMERGENCY (EMERGENCY)
Facility: HOSPITAL | Age: 30
LOS: 1 days | Discharge: HOME | End: 2020-11-24
Attending: EMERGENCY MEDICINE
Payer: MEDICAID

## 2020-11-24 VITALS
HEART RATE: 108 BPM | RESPIRATION RATE: 18 BRPM | DIASTOLIC BLOOD PRESSURE: 85 MMHG | HEIGHT: 65 IN | WEIGHT: 147.05 LBS | TEMPERATURE: 98 F | OXYGEN SATURATION: 97 % | SYSTOLIC BLOOD PRESSURE: 129 MMHG

## 2020-11-24 DIAGNOSIS — O00.90 UNSPECIFIED ECTOPIC PREGNANCY WITHOUT INTRAUTERINE PREGNANCY: Chronic | ICD-10-CM

## 2020-11-24 PROCEDURE — 99053 MED SERV 10PM-8AM 24 HR FAC: CPT

## 2020-11-24 PROCEDURE — 99284 EMERGENCY DEPT VISIT MOD MDM: CPT

## 2020-11-24 NOTE — ED ADULT TRIAGE NOTE - CHIEF COMPLAINT QUOTE
s/p david   h/o MR SOB and CP x3 days, +chills. No known fevers or sick contacts. Recently lost father to COVID.

## 2020-11-25 VITALS
RESPIRATION RATE: 14 BRPM | HEART RATE: 63 BPM | SYSTOLIC BLOOD PRESSURE: 116 MMHG | OXYGEN SATURATION: 100 % | DIASTOLIC BLOOD PRESSURE: 72 MMHG | TEMPERATURE: 98 F

## 2020-11-25 LAB — SARS-COV-2 RNA SPEC QL NAA+PROBE: SIGNIFICANT CHANGE UP

## 2020-11-25 PROCEDURE — 71045 X-RAY EXAM CHEST 1 VIEW: CPT

## 2020-11-25 PROCEDURE — U0003: CPT

## 2020-11-25 PROCEDURE — 99284 EMERGENCY DEPT VISIT MOD MDM: CPT | Mod: 25

## 2020-11-25 PROCEDURE — 71045 X-RAY EXAM CHEST 1 VIEW: CPT | Mod: 26

## 2020-11-25 RX ORDER — IBUPROFEN 200 MG
600 TABLET ORAL ONCE
Refills: 0 | Status: COMPLETED | OUTPATIENT
Start: 2020-11-25 | End: 2020-11-25

## 2020-11-25 RX ADMIN — Medication 600 MILLIGRAM(S): at 03:23

## 2020-11-25 NOTE — ED PROVIDER NOTE - OBJECTIVE STATEMENT
Jean Marie Kearns MD: 31 yo F no PMH p/w sternal CP x 2 week. Pt states that her CP has gotten worse the past 3 days after finding out that he father  from COVID in [place]. Pt state that at first her CP intermittent non-radiating, not associated with inspiration, position, exertion or food consumption but for the past 3 days her CP is worse with inspiration. Pt denies nausea, vomiting, shortness of breath, weakness, fatigue, lightheadedness.  Pt has had no recent long trips, surgery, trauma, denies hemoptysis, and has no hx of DVT/PE. PERC 0.  Pt also denies fevers, chills cough, OCP use.

## 2020-11-25 NOTE — ED ADULT NURSE NOTE - OBJECTIVE STATEMENT
29 y/o female PMHx ectopic pregnancy 3 years ago arrives to [facility] ED by car from home with c/o shortness of breath. Pt states posterior headache x 1 week, gradually worsening shortness of breath/left sided chest pain/weakness x 3 days. Patient lost father to covid last week, but father resided in [place]. Patient is A&Ox4. Respirations spontaneous and unlabored. No abdominal pain, soft NT/ND. No n/v/d. LMP last week. Denies urinary symptoms. Denies fever, +chills. No sick contacts. Skin is warm/dry and normal for race.

## 2020-11-25 NOTE — ED ADULT NURSE NOTE - PMH
Ectopic pregnancy    No pertinent past medical history     <<----- Click to add NO pertinent Past Medical History

## 2020-11-25 NOTE — ED PROVIDER NOTE - PHYSICAL EXAMINATION
Gen: AAOx3, non-toxic  Head: NCAT  HEENT: EOMI, PERRLA, oral mucosa moist, normal conjunctiva  Lung: CTAB, no respiratory distress, no wheezes/rhonchi/rales B/L, speaking in full sentences  CV: RRR, no murmurs, rubs or gallops  Abd: soft, NTND, no guarding, no CVA tenderness, no rebound tenderness  MSK: no visible deformities, full range of motion of all 4 exts  Neuro: No focal sensory or motor deficits  Skin: Warm, well perfused, no rash  Psych: normal affect.   ~Jean Marie Kearns MD

## 2020-11-25 NOTE — ED PROVIDER NOTE - PATIENT PORTAL LINK FT
You can access the FollowMyHealth Patient Portal offered by [facility] by registering at the following website: [email or web address]. By joining [facility]’s FollowMyHealth portal, you will also be able to view your health information using other applications (apps) compatible with our system.

## 2020-11-25 NOTE — ED PROVIDER NOTE - PROGRESS NOTE DETAILS
Jean Marie Kearns MD: CXR was clear. Pt well appearing and asymptomatic. Pt is ambulatory and tolerating PO. Spoke with pt about return precautions. Pt agrees to follow up with their PCP. Pt ready for discharge

## 2020-11-25 NOTE — ED PROVIDER NOTE - NS ED ROS FT
GENERAL: No fever or chills, EYES: no change in vision, HEENT: no trouble speaking, CARDIAC: + chest pain, - palpitation PULMONARY: no cough or SOB, GI: no abdominal pain, no nausea, no vomiting, no diarrhea or constipation, : No changes in urination, SKIN: no rashes, NEURO: no headache,  MSK: No muscle pain ~Jean Marie Kearns MD

## 2020-11-25 NOTE — ED PROVIDER NOTE - NSFOLLOWUPINSTRUCTIONS_ED_ALL_ED_FT
Chest Pain    Chest pain can be caused by many different conditions which may or may not be dangerous. Causes include heartburn, lung infections, heart attack, blood clot in lungs, skin infections, strain or damage to muscle, cartilage, or bones, etc. In addition to a history and physical examination, an electrocardiogram (ECG) or other lab tests may have been performed to determine the cause of your chest pain. Follow up with your primary care provider or with a cardiologist as instructed.     SEEK IMMEDIATE MEDICAL CARE IF YOU HAVE ANY OF THE FOLLOWING SYMPTOMS: worsening chest pain, coughing up blood, unexplained back/neck/jaw pain, severe abdominal pain, dizziness or lightheadedness, fainting, shortness of breath, sweaty or clammy skin, vomiting, or racing heart beat. These symptoms may represent a serious problem that is an emergency. Do not wait to see if the symptoms will go away. Get medical help right away. Call 911 and do not drive yourself to the hospital.    Depression    Depression is a mental illness that usually causes feelings of sadness, hopelessness, or helplessness. Some people with this disorder do not feel particularly sad but lose interest in doing things they used to enjoy. Major depressive disorder also can cause physical symptoms. It can interfere with work, school, relationships, and other normal everyday activities. If you were started on a medication, make sure to take exactly as prescribed and follow up with a psychiatrist.    SEEK IMMEDIATE MEDICAL CARE IF YOU HAVE ANY OF THE FOLLOWING SYMPTOMS: thoughts about hurting or killing yourself, thoughts about hurting or killing somebody else, hallucinations, or worsening depression.

## 2020-12-04 ENCOUNTER — APPOINTMENT (OUTPATIENT)
Dept: INTERNAL MEDICINE | Facility: CLINIC | Age: 30
End: 2020-12-04

## 2020-12-04 ENCOUNTER — OUTPATIENT (OUTPATIENT)
Dept: OUTPATIENT SERVICES | Facility: HOSPITAL | Age: 30
LOS: 1 days | End: 2020-12-04
Payer: SELF-PAY

## 2020-12-04 ENCOUNTER — MED ADMIN CHARGE (OUTPATIENT)
Age: 30
End: 2020-12-04

## 2020-12-04 ENCOUNTER — LABORATORY RESULT (OUTPATIENT)
Age: 30
End: 2020-12-04

## 2020-12-04 VITALS
SYSTOLIC BLOOD PRESSURE: 120 MMHG | HEIGHT: 64 IN | WEIGHT: 145 LBS | DIASTOLIC BLOOD PRESSURE: 78 MMHG | BODY MASS INDEX: 24.75 KG/M2

## 2020-12-04 DIAGNOSIS — R10.9 UNSPECIFIED ABDOMINAL PAIN: ICD-10-CM

## 2020-12-04 DIAGNOSIS — I10 ESSENTIAL (PRIMARY) HYPERTENSION: ICD-10-CM

## 2020-12-04 DIAGNOSIS — R30.0 DYSURIA: ICD-10-CM

## 2020-12-04 DIAGNOSIS — Z23 ENCOUNTER FOR IMMUNIZATION: ICD-10-CM

## 2020-12-04 DIAGNOSIS — M79.675 PAIN IN LEFT TOE(S): ICD-10-CM

## 2020-12-04 DIAGNOSIS — O00.90 UNSPECIFIED ECTOPIC PREGNANCY WITHOUT INTRAUTERINE PREGNANCY: Chronic | ICD-10-CM

## 2020-12-04 PROCEDURE — G0463: CPT

## 2020-12-04 RX ORDER — CLOTRIMAZOLE 10 MG/G
1 CREAM TOPICAL 3 TIMES DAILY
Qty: 1 | Refills: 3 | Status: ACTIVE | COMMUNITY
Start: 2017-12-01 | End: 1900-01-01

## 2020-12-04 NOTE — HEALTH RISK ASSESSMENT
[No] : No [No falls in past year] : Patient reported no falls in the past year [0] : 1) Little interest or pleasure doing things: Not at all (0) [1] : 2) Feeling down, depressed, or hopeless for several days (1) [] : No [[id number]] : 1

## 2020-12-04 NOTE — PHYSICAL EXAM
[No Acute Distress] : no acute distress [Well Nourished] : well nourished [Well Developed] : well developed [Well-Appearing] : well-appearing [Normal Sclera/Conjunctiva] : normal sclera/conjunctiva [PERRL] : pupils equal round and reactive to light [EOMI] : extraocular movements intact [Normal Outer Ear/Nose] : the outer ears and nose were normal in appearance [Normal Oropharynx] : the oropharynx was normal [No JVD] : no jugular venous distention [No Lymphadenopathy] : no lymphadenopathy [Supple] : supple [Thyroid Normal, No Nodules] : the thyroid was normal and there were no nodules present [No Respiratory Distress] : no respiratory distress  [No Accessory Muscle Use] : no accessory muscle use [Clear to Auscultation] : lungs were clear to auscultation bilaterally [Normal Rate] : normal rate  [Regular Rhythm] : with a regular rhythm [Normal S1, S2] : normal S1 and S2 [No Murmur] : no murmur heard [No Carotid Bruits] : no carotid bruits [No Abdominal Bruit] : a ~M bruit was not heard ~T in the abdomen [No Varicosities] : no varicosities [Pedal Pulses Present] : the pedal pulses are present [No Edema] : there was no peripheral edema [No Palpable Aorta] : no palpable aorta [No Extremity Clubbing/Cyanosis] : no extremity clubbing/cyanosis [Soft] : abdomen soft [Non Tender] : non-tender [Non-distended] : non-distended [No Masses] : no abdominal mass palpated [No HSM] : no HSM [Normal Bowel Sounds] : normal bowel sounds [Normal Posterior Cervical Nodes] : no posterior cervical lymphadenopathy [Normal Anterior Cervical Nodes] : no anterior cervical lymphadenopathy [No CVA Tenderness] : no CVA  tenderness [No Spinal Tenderness] : no spinal tenderness [No Joint Swelling] : no joint swelling [Grossly Normal Strength/Tone] : grossly normal strength/tone [No Rash] : no rash [Coordination Grossly Intact] : coordination grossly intact [No Focal Deficits] : no focal deficits [Normal Gait] : normal gait [Normal Affect] : the affect was normal [Normal Insight/Judgement] : insight and judgment were intact [de-identified] : At the L great toe, mari has short nail. The toe has no visiable lacreation, abscess, fluid collection, or drinage. no obvious swelling or warmth compared to other fingers and other side. tender to palpation, but epiciritic and protopathic sensation intact at the toe.

## 2020-12-04 NOTE — ASSESSMENT
[FreeTextEntry1] : Mrs. Serrano is a 29 y/o female with PMH significant for allergic rhinitis, gastritis, tension headache, and left toe avulsion 2/2 trauma c/b onychomycosis, who presented to the clinic with left toe pain.\par \par # L toe pain\par - toe pain with pressure, no laceration, abscess, collection of fluids, or drainage. No obvious overlying skin changes. \par - patient had improvement with clotrimazole cream in the past, which will be re-prescribed, though unsure if onychomycosis causes her symptom.\par - discussed about foot hygiene. \par - podiatry referral given. \par - advised to call clinic if she notices any increase in swelling/warmth/erythema or finds some discharge from the area.\par \par # L flank pain\par - likely MSK in that it only occurs when she lies on her side at night. \par - However, given the radiation to the groin and dysuria, will acquire UA.\par - Renal US given to assess nephrolithiasis or other structural abnormalities. Prefer to avoid CT if possible given her age to minimize radiation exposure.\par \par # Adjustment disorder\par - patient had recent family member loss due to COVID19. No SI/HI or concerning red flags. Has good support from her family including . \par - PHQ2 = 1\par - Advised her to call clinic if she has worsening of depressive symptoms or feels need to talk to someone to ge help.\par \par # HCM\par - pap smear done in GYN clinic on 7/2020: unremarkable.\par - PHQ2 = 1  \par - Flu shot today.

## 2020-12-04 NOTE — HISTORY OF PRESENT ILLNESS
[[employer] ] : provided by [employer]   [FreeTextEntry1] : [id number] [TWNoteComboBox1] : [place] [de-identified] : Mrs. Serrano is a 31 y/o female with PMH significant for allergic rhinitis, gastritis, tension headache, and left toe avulsion 2/2 trauma c/b onychomycosis, who presented to the clinic with left toe pain.\par \par She states that she started to have L toe pain starting two weeks ago, and she is concerned for possible fungal infection, which brings her to the clinic. She states that she had trauma leading to L hallux avulsion (2016) and nail removed (2017), and at that time she had similar pain at that time, when she was referred to podiatrist, who prescribed some kind of "cream," which helped her symptoms improve. She states that the pain is intermittent stabbing quality with no radiation. No trigger, weakness, fever, chills, falling, discharges,. She noticed some mild swelling at the tip, yet no overall swelling. She can ambulate without problem and weight bear on left foot, yet putting pressure induced some distal pain. No paresthesia or numbness.\par \par Also she noticed right sided flank pain. She usually sleeps on her right and with the position at night as she lays down, she gets right sided flank pain. The pain sometimes radiates down to her groins. it is not severe, yet she gets it regularly at night time. does not occur during the daytime. She can move her torso without recreating the pain, but pressure on the area can induce sometimes. She also reports having some intermittent dysuria without hematuria. No urinary frequency or hesitation. no n/v/d/c. \par \par Of note, one of patient's parent passed away recently due to COVID-19 infection at her home country. She is having dysthymia yet denies anhedonia. She reports that her family including her  is a great support. no SI /HI.

## 2020-12-04 NOTE — REVIEW OF SYSTEMS
[Dysuria] : dysuria [Negative] : Heme/Lymph [Incontinence] : no incontinence [Nocturia] : no nocturia [Hematuria] : no hematuria [Frequency] : no frequency [Vaginal Discharge] : no vaginal discharge [FreeTextEntry9] : foot pain. right flank pain.

## 2020-12-07 ENCOUNTER — APPOINTMENT (OUTPATIENT)
Dept: ULTRASOUND IMAGING | Facility: CLINIC | Age: 30
End: 2020-12-07
Payer: COMMERCIAL

## 2020-12-07 ENCOUNTER — OUTPATIENT (OUTPATIENT)
Dept: OUTPATIENT SERVICES | Facility: HOSPITAL | Age: 30
LOS: 1 days | End: 2020-12-07
Payer: SELF-PAY

## 2020-12-07 DIAGNOSIS — O00.90 UNSPECIFIED ECTOPIC PREGNANCY WITHOUT INTRAUTERINE PREGNANCY: Chronic | ICD-10-CM

## 2020-12-07 DIAGNOSIS — R30.0 DYSURIA: ICD-10-CM

## 2020-12-07 DIAGNOSIS — I10 ESSENTIAL (PRIMARY) HYPERTENSION: ICD-10-CM

## 2020-12-07 PROCEDURE — 76775 US EXAM ABDO BACK WALL LIM: CPT

## 2020-12-07 PROCEDURE — 76775 US EXAM ABDO BACK WALL LIM: CPT | Mod: 26

## 2020-12-08 LAB
APPEARANCE: ABNORMAL
BILIRUBIN URINE: NEGATIVE
BLOOD URINE: NEGATIVE
COLOR: NORMAL
GLUCOSE QUALITATIVE U: NEGATIVE
KETONES URINE: NEGATIVE
LEUKOCYTE ESTERASE URINE: NEGATIVE
NITRITE URINE: NEGATIVE
PH URINE: 6.5
PROTEIN URINE: NEGATIVE
SPECIFIC GRAVITY URINE: 1.01
UROBILINOGEN URINE: NORMAL

## 2020-12-09 DIAGNOSIS — R10.9 UNSPECIFIED ABDOMINAL PAIN: ICD-10-CM

## 2020-12-09 DIAGNOSIS — M79.675 PAIN IN LEFT TOE(S): ICD-10-CM

## 2020-12-09 DIAGNOSIS — R30.0 DYSURIA: ICD-10-CM

## 2020-12-16 PROBLEM — N76.0 BACTERIAL VAGINOSIS: Status: RESOLVED | Noted: 2018-11-01 | Resolved: 2020-12-16

## 2020-12-16 NOTE — ED ADULT NURSE NOTE - NSIMPLEMENTINTERV_GEN_ALL_ED
Bety Stout MD       •  dextrose 50 % injection 12.5 g  12.5 g Intravenous PRN Bety Stout MD       • glucagon (GLUCAGEN) injection 1 mg  1 mg Intramuscular PRN Bety Stout MD       • dextrose (GLUTOSE) 40 % gel 15 g  15 g Oral PRN Bety Stout MD       • dextrose (GLUTOSE) 40 % gel 30 g  30 g Oral PRN Bety Stout MD       • sodium chloride 0.9 % flush bag 25 mL  25 mL Intravenous PRN Bety Stout MD       • insulin lispro (HumaLOG) scheduled dose AND correction dose   Subcutaneous TID  Samara Denton MD   1 Units at 12/16/20 1229   • insulin lispro (HumaLOG) scheduled dose AND correction dose   Subcutaneous Nightly Bety Stout MD   2 Units at 12/11/20 2214   • Potassium Standard Replacement Protocol   Does not apply See Admin Instructions Bety Stout MD       • Magnesium Standard Replacement Protocol   Does not apply See Admin Instructions Bety Stout MD       • prochlorperazine (COMPAZINE) injection 5 mg  5 mg Intravenous Q4H PRN Bety Stout MD       • acetaminophen (TYLENOL) tablet 650 mg  650 mg Oral Q4H PRN Bety Stout MD       • polyethylene glycol (MIRALAX) packet 17 g  17 g Oral Daily PRN Bety Stout MD       • docusate sodium-sennosides (SENOKOT S) 50-8.6 MG 2 tablet  2 tablet Oral Daily PRN Bety Stout MD       • pravastatin (PRAVACHOL) tablet 40 mg  40 mg Oral Nightly Bety Stout MD   40 mg at 12/15/20 2040   • sodium chloride 0.9 % flush bag 25 mL  25 mL Intravenous PRN Bety Stout MD       • sodium chloride (PF) 0.9 % injection 2 mL  2 mL Intracatheter 2 times per day Bety Stout MD   2 mL at 12/16/20 0918          Physical Exam  Physical Exam  Constitutional:       Appearance: He is obese.   HENT:      Head: Normocephalic and atraumatic.   Eyes:      Conjunctiva/sclera: Conjunctivae normal.   Cardiovascular:      Rate and Rhythm: Normal rate and  resolving   - Glucose 153 - 234 in past 24h (12/15)   - Continue Lantus 26U qd + Humalog 5U prandial + sliding scale   - Monitor accuchecks   - Continue CCM Diet    Multiple electrolyte abnormalities (Hypokalemia, Hypomagnesemia, Hypophosphatemia)    - K and Mg protocol in place    - Monitor and correcting as needed    Moderate size fat-containing ventral hernia, just below the xiphoid process  Seen on CTA chest (12/1)   - Monitor     Moderate to Severe Protein Calorie Malnutrition  Albumin 2.1 on arrival   - Diet, as above   - Encourage PO Intake as tolerated    Lower back pain  CT L-Spine (12/9): No acute abnormality in the lumbar spine. Mild multilevel lumbar degenerative disc and joint disease. Moderate to severe L3-L4 and L4-L5 central canal stenoses. Severe bilateral L5-S1 neural foraminal stenoses.   - PRN Tylenol    - Monitor    HLD - Continue with pravastatin 40mg QHS  Hypothyroidism - Continue with levothyroxine 125 mcg qd    Code Status  Full Code    DVT Prophylaxis  SCDs, TEDs    Disposition: Pt presenting with lightheadedness 2/2 CAP vs COPD exacerbation. COVID negative.  Pulm and Cardio following.  Monitor respiratory status, currently on 5L NC during the day and BIPAP at night . On Solumedrol. Completed ABX for suspected PNA. BRBPR on 12/6, s/p colonoscopy (12/7) without active bleeding. Hb drop to 7.2 on 12/10, EGD with duodenal ulcers. SQH resumed. GI following.     Primary Care Physician  No Pcp    All patient questions answered  All labs and imaging reviewed    Charting performed by alen Brasher for Dr. Bety Stout        All medical record entries made by the scribe were at my direction. I have reviewed the chart  and agree that the record accurately reflects my personal performance of the history, physical  exam, hospital course, and assessment and plan.     [facility], [facility].  Bety Stout M.D.     Implemented All Universal Safety Interventions:  [employer] to call system. Call bell, personal items and telephone within reach. Instruct patient to call for assistance. Room bathroom lighting operational. Non-slip footwear when patient is off stretcher. Physically safe environment: no spills, clutter or unnecessary equipment. Stretcher in lowest position, wheels locked, appropriate side rails in place.

## 2020-12-21 PROBLEM — J02.0 PHARYNGITIS DUE TO GROUP A BETA HEMOLYTIC STREPTOCOCCI: Status: RESOLVED | Noted: 2019-01-25 | Resolved: 2020-12-21

## 2020-12-21 PROBLEM — J06.9 UPPER RESPIRATORY INFECTION, ACUTE: Status: RESOLVED | Noted: 2019-01-25 | Resolved: 2020-12-21

## 2020-12-23 PROBLEM — Z86.19 HISTORY OF CANDIDIASIS OF VAGINA: Status: RESOLVED | Noted: 2020-06-24 | Resolved: 2020-12-23

## 2021-02-04 ENCOUNTER — APPOINTMENT (OUTPATIENT)
Dept: INTERNAL MEDICINE | Facility: CLINIC | Age: 31
End: 2021-02-04

## 2021-02-04 ENCOUNTER — LABORATORY RESULT (OUTPATIENT)
Age: 31
End: 2021-02-04

## 2021-02-04 ENCOUNTER — OUTPATIENT (OUTPATIENT)
Dept: OUTPATIENT SERVICES | Facility: HOSPITAL | Age: 31
LOS: 1 days | End: 2021-02-04
Payer: SELF-PAY

## 2021-02-04 VITALS
BODY MASS INDEX: 25.61 KG/M2 | SYSTOLIC BLOOD PRESSURE: 120 MMHG | HEIGHT: 64 IN | HEART RATE: 56 BPM | OXYGEN SATURATION: 97 % | DIASTOLIC BLOOD PRESSURE: 70 MMHG | WEIGHT: 150 LBS

## 2021-02-04 DIAGNOSIS — U07.1 COVID-19: ICD-10-CM

## 2021-02-04 DIAGNOSIS — O00.90 UNSPECIFIED ECTOPIC PREGNANCY WITHOUT INTRAUTERINE PREGNANCY: Chronic | ICD-10-CM

## 2021-02-04 DIAGNOSIS — Z00.00 ENCOUNTER FOR GENERAL ADULT MEDICAL EXAMINATION WITHOUT ABNORMAL FINDINGS: ICD-10-CM

## 2021-02-04 DIAGNOSIS — R10.2 PELVIC AND PERINEAL PAIN: ICD-10-CM

## 2021-02-04 DIAGNOSIS — I10 ESSENTIAL (PRIMARY) HYPERTENSION: ICD-10-CM

## 2021-02-04 DIAGNOSIS — R10.13 EPIGASTRIC PAIN: ICD-10-CM

## 2021-02-04 PROCEDURE — G0463: CPT

## 2021-02-04 PROCEDURE — 80048 BASIC METABOLIC PNL TOTAL CA: CPT

## 2021-02-04 PROCEDURE — 85027 COMPLETE CBC AUTOMATED: CPT

## 2021-02-04 NOTE — PHYSICAL EXAM
[No Acute Distress] : no acute distress [Well Developed] : well developed [No Respiratory Distress] : no respiratory distress  [Clear to Auscultation] : lungs were clear to auscultation bilaterally [Normal Rate] : normal rate  [Normal S1, S2] : normal S1 and S2 [Soft] : abdomen soft [Non Tender] : non-tender [No HSM] : no HSM [Coordination Grossly Intact] : coordination grossly intact [No Focal Deficits] : no focal deficits [de-identified] : epigastric and RLQ tendernes to palpation. No flank tenderness

## 2021-02-04 NOTE — END OF VISIT
[] : Resident [FreeTextEntry3] : 30F presents for CPE today; she is well appearing and in no distress. She had COVID infection earlier this year and has recovered since then. No spinal, paraspinal or CVA tenderness. MIld tenderness in epigastrium. Patient reports mild tenderness in RLQ and LLQ. Non distended. No masses palpated. \par Counselled patient on avoiding high acidity foods such as orange, lemon, grapefruit and other citrus fruits/juices, spicy foods, tomato based foods, alcohol and caffeine. Advised patient to drink plenty of water and not to lay down after eating.\par Lower abdominal pain occurs after menses; she has pelvic US that is normal. She has never taken Advil before. Advised trial Advil prn for the pain. No change in bowel movements and no GI symptoms other than heartburn.

## 2021-02-04 NOTE — HEALTH RISK ASSESSMENT
[Very Good] : ~his/her~  mood as very good [No] : No [0] : 2) Feeling down, depressed, or hopeless: Not at all (0) [Patient reported PAP Smear was normal] : Patient reported PAP Smear was normal [] : No [de-identified] : does not exercise [[id number]] : 0 [PapSmearDate] : 07/2020

## 2021-02-04 NOTE — ASSESSMENT
[FreeTextEntry1] : 31 y/o female with hx of significant for allergic rhinitis, gastritis, tension headache, COVID19, and left toe avulsion 2/2 trauma c/b onychomycosis who presents for annual physical examination.\par \par #COVID19\par - recovered\par \par # Epigastric pain\par - patient endorses eating spicy foods, citrus, caffeinated drinks and alcohol\par - recommended pepcid PRN\par \par # Pelvic pain\par - pt encouraged to take Advil PRN\par - advised to stay hydrated \par \par # HCM\par - UTD flu shot\par - F/u cbc and bmp\par \par - Case d/w Dr. Avalos\par - RTC in 1 year. If abdominal pain does not improved, encouraged to make an appointment sooner

## 2021-02-04 NOTE — REVIEW OF SYSTEMS
[Shortness Of Breath] : shortness of breath [Cough] : cough [Fever] : no fever [Chills] : no chills [Sore Throat] : no sore throat [Postnasal Drip] : no postnasal drip [Chest Pain] : no chest pain [Palpitations] : no palpitations [Abdominal Pain] : no abdominal pain [Nausea] : no nausea [Vomiting] : no vomiting [Dysuria] : no dysuria [Frequency] : no frequency [Headache] : no headache [Dizziness] : no dizziness

## 2021-02-04 NOTE — HISTORY OF PRESENT ILLNESS
[[employer] ] : provided by [employer]   [FreeTextEntry1] : [id number] [TWNoteComboBox1] : [place] [de-identified] : 31 y/o female with hx of significant for allergic rhinitis, gastritis, tension headache, COVID19, and left toe avulsion 2/2 trauma c/b onychomycosis who presents for annual physical examination. She is endorsing epigastric pain that started few days ago. She describes it as burning in sensation and worsens with eating. Her pain is 8/10 and non-radiating. Patient has had tested COVID19 positive on 1/13 and has quarantined herself. She was having fever (last time Jan 22nd), chills, sore throat and headache. She quarantined herself for 14 days and her symptoms have since resolved. Patient was prescribed AZT, which she completed. Patient endorses mild SOB (improving), and cough. Currently, denies fever, nausea, vomiting, chest pain. Not currently on medications. \par \par \par She endorses pelvic pain, x 6 months. Her pain usually starts after her period and lasts for one week. It worsens with palpation.

## 2021-02-05 LAB
ANION GAP SERPL CALC-SCNC: 15 MMOL/L — SIGNIFICANT CHANGE UP (ref 5–17)
BUN SERPL-MCNC: 10 MG/DL — SIGNIFICANT CHANGE UP (ref 7–23)
CALCIUM SERPL-MCNC: 9.6 MG/DL — SIGNIFICANT CHANGE UP (ref 8.4–10.5)
CHLORIDE SERPL-SCNC: 105 MMOL/L — SIGNIFICANT CHANGE UP (ref 96–108)
CO2 SERPL-SCNC: 20 MMOL/L — LOW (ref 22–31)
CREAT SERPL-MCNC: 0.58 MG/DL — SIGNIFICANT CHANGE UP (ref 0.5–1.3)
GLUCOSE SERPL-MCNC: 94 MG/DL — SIGNIFICANT CHANGE UP (ref 70–99)
HCT VFR BLD CALC: 40.1 % — SIGNIFICANT CHANGE UP (ref 34.5–45)
HGB BLD-MCNC: 13 G/DL — SIGNIFICANT CHANGE UP (ref 11.5–15.5)
MCHC RBC-ENTMCNC: 30.2 PG — SIGNIFICANT CHANGE UP (ref 27–34)
MCHC RBC-ENTMCNC: 32.4 GM/DL — SIGNIFICANT CHANGE UP (ref 32–36)
MCV RBC AUTO: 93 FL — SIGNIFICANT CHANGE UP (ref 80–100)
PLATELET # BLD AUTO: 241 K/UL — SIGNIFICANT CHANGE UP (ref 150–400)
POTASSIUM SERPL-MCNC: 4.2 MMOL/L — SIGNIFICANT CHANGE UP (ref 3.5–5.3)
POTASSIUM SERPL-SCNC: 4.2 MMOL/L — SIGNIFICANT CHANGE UP (ref 3.5–5.3)
RBC # BLD: 4.31 M/UL — SIGNIFICANT CHANGE UP (ref 3.8–5.2)
RBC # FLD: 11.9 % — SIGNIFICANT CHANGE UP (ref 10.3–14.5)
SODIUM SERPL-SCNC: 141 MMOL/L — SIGNIFICANT CHANGE UP (ref 135–145)
WBC # BLD: 4.18 K/UL — SIGNIFICANT CHANGE UP (ref 3.8–10.5)
WBC # FLD AUTO: 4.18 K/UL — SIGNIFICANT CHANGE UP (ref 3.8–10.5)

## 2021-03-16 ENCOUNTER — NON-APPOINTMENT (OUTPATIENT)
Age: 31
End: 2021-03-16

## 2021-04-22 PROBLEM — Z00.00 ENCOUNTER FOR PREVENTIVE HEALTH EXAMINATION: Status: ACTIVE | Noted: 2021-04-22

## 2021-04-23 NOTE — ED ADULT NURSE NOTE - RN DISCHARGE SIGNATURE

## 2021-04-26 ENCOUNTER — APPOINTMENT (OUTPATIENT)
Dept: OBGYN | Facility: CLINIC | Age: 31
End: 2021-04-26
Payer: COMMERCIAL

## 2021-04-26 PROCEDURE — 99385 PREV VISIT NEW AGE 18-39: CPT

## 2021-04-26 PROCEDURE — 36415 COLL VENOUS BLD VENIPUNCTURE: CPT

## 2021-06-21 ENCOUNTER — APPOINTMENT (OUTPATIENT)
Dept: OBGYN | Facility: CLINIC | Age: 31
End: 2021-06-21

## 2021-12-07 ENCOUNTER — NON-APPOINTMENT (OUTPATIENT)
Age: 31
End: 2021-12-07

## 2021-12-08 ENCOUNTER — NON-APPOINTMENT (OUTPATIENT)
Age: 31
End: 2021-12-08

## 2021-12-08 ENCOUNTER — OUTPATIENT (OUTPATIENT)
Dept: OUTPATIENT SERVICES | Facility: HOSPITAL | Age: 31
LOS: 1 days | End: 2021-12-08
Payer: SELF-PAY

## 2021-12-08 ENCOUNTER — LABORATORY RESULT (OUTPATIENT)
Age: 31
End: 2021-12-08

## 2021-12-08 ENCOUNTER — APPOINTMENT (OUTPATIENT)
Dept: INTERNAL MEDICINE | Facility: CLINIC | Age: 31
End: 2021-12-08

## 2021-12-08 VITALS
HEIGHT: 64 IN | BODY MASS INDEX: 27.14 KG/M2 | OXYGEN SATURATION: 98 % | DIASTOLIC BLOOD PRESSURE: 70 MMHG | SYSTOLIC BLOOD PRESSURE: 100 MMHG | HEART RATE: 76 BPM | WEIGHT: 159 LBS

## 2021-12-08 DIAGNOSIS — O00.90 UNSPECIFIED ECTOPIC PREGNANCY WITHOUT INTRAUTERINE PREGNANCY: Chronic | ICD-10-CM

## 2021-12-08 DIAGNOSIS — I10 ESSENTIAL (PRIMARY) HYPERTENSION: ICD-10-CM

## 2021-12-08 DIAGNOSIS — R07.9 CHEST PAIN, UNSPECIFIED: ICD-10-CM

## 2021-12-08 DIAGNOSIS — R06.02 SHORTNESS OF BREATH: ICD-10-CM

## 2021-12-08 DIAGNOSIS — R10.816 EPIGASTRIC ABDOMINAL TENDERNESS: ICD-10-CM

## 2021-12-08 DIAGNOSIS — B35.1 TINEA UNGUIUM: ICD-10-CM

## 2021-12-08 LAB
ALBUMIN SERPL ELPH-MCNC: 4.4 G/DL — SIGNIFICANT CHANGE UP (ref 3.3–5)
ALP SERPL-CCNC: 60 U/L — SIGNIFICANT CHANGE UP (ref 40–120)
ALT FLD-CCNC: 18 U/L — SIGNIFICANT CHANGE UP (ref 10–45)
ANION GAP SERPL CALC-SCNC: 7 MMOL/L — SIGNIFICANT CHANGE UP (ref 5–17)
AST SERPL-CCNC: 24 U/L — SIGNIFICANT CHANGE UP (ref 10–40)
BILIRUB SERPL-MCNC: 0.3 MG/DL — SIGNIFICANT CHANGE UP (ref 0.2–1.2)
BUN SERPL-MCNC: 16 MG/DL — SIGNIFICANT CHANGE UP (ref 7–23)
CALCIUM SERPL-MCNC: 9.3 MG/DL — SIGNIFICANT CHANGE UP (ref 8.4–10.5)
CHLORIDE SERPL-SCNC: 103 MMOL/L — SIGNIFICANT CHANGE UP (ref 96–108)
CO2 SERPL-SCNC: 27 MMOL/L — SIGNIFICANT CHANGE UP (ref 22–31)
CREAT SERPL-MCNC: 0.72 MG/DL — SIGNIFICANT CHANGE UP (ref 0.5–1.3)
GLUCOSE SERPL-MCNC: 94 MG/DL — SIGNIFICANT CHANGE UP (ref 70–99)
POTASSIUM SERPL-MCNC: 4.3 MMOL/L — SIGNIFICANT CHANGE UP (ref 3.5–5.3)
POTASSIUM SERPL-SCNC: 4.3 MMOL/L — SIGNIFICANT CHANGE UP (ref 3.5–5.3)
PROT SERPL-MCNC: 6.9 G/DL — SIGNIFICANT CHANGE UP (ref 6–8.3)
SODIUM SERPL-SCNC: 137 MMOL/L — SIGNIFICANT CHANGE UP (ref 135–145)

## 2021-12-08 PROCEDURE — 85025 COMPLETE CBC W/AUTO DIFF WBC: CPT

## 2021-12-08 PROCEDURE — 36415 COLL VENOUS BLD VENIPUNCTURE: CPT

## 2021-12-08 PROCEDURE — 80053 COMPREHEN METABOLIC PANEL: CPT

## 2021-12-08 PROCEDURE — G0463: CPT | Mod: 25

## 2021-12-08 PROCEDURE — 93005 ELECTROCARDIOGRAM TRACING: CPT

## 2021-12-08 RX ORDER — FAMOTIDINE 20 MG/1
20 TABLET, FILM COATED ORAL TWICE DAILY
Qty: 60 | Refills: 0 | Status: ACTIVE | COMMUNITY
Start: 2019-12-03 | End: 1900-01-01

## 2021-12-09 LAB
BASOPHILS # BLD AUTO: 0.03 K/UL — SIGNIFICANT CHANGE UP (ref 0–0.2)
BASOPHILS NFR BLD AUTO: 0.5 % — SIGNIFICANT CHANGE UP (ref 0–2)
EOSINOPHIL # BLD AUTO: 0.13 K/UL — SIGNIFICANT CHANGE UP (ref 0–0.5)
EOSINOPHIL NFR BLD AUTO: 2.4 % — SIGNIFICANT CHANGE UP (ref 0–6)
HCT VFR BLD CALC: 39.6 % — SIGNIFICANT CHANGE UP (ref 34.5–45)
HGB BLD-MCNC: 12.9 G/DL — SIGNIFICANT CHANGE UP (ref 11.5–15.5)
IMM GRANULOCYTES NFR BLD AUTO: 0 % — SIGNIFICANT CHANGE UP (ref 0–1.5)
LYMPHOCYTES # BLD AUTO: 2.87 K/UL — SIGNIFICANT CHANGE UP (ref 1–3.3)
LYMPHOCYTES # BLD AUTO: 52.1 % — HIGH (ref 13–44)
MCHC RBC-ENTMCNC: 31.6 PG — SIGNIFICANT CHANGE UP (ref 27–34)
MCHC RBC-ENTMCNC: 32.6 GM/DL — SIGNIFICANT CHANGE UP (ref 32–36)
MCV RBC AUTO: 97.1 FL — SIGNIFICANT CHANGE UP (ref 80–100)
MONOCYTES # BLD AUTO: 0.3 K/UL — SIGNIFICANT CHANGE UP (ref 0–0.9)
MONOCYTES NFR BLD AUTO: 5.4 % — SIGNIFICANT CHANGE UP (ref 2–14)
NEUTROPHILS # BLD AUTO: 2.18 K/UL — SIGNIFICANT CHANGE UP (ref 1.8–7.4)
NEUTROPHILS NFR BLD AUTO: 39.6 % — LOW (ref 43–77)
PLATELET # BLD AUTO: 207 K/UL — SIGNIFICANT CHANGE UP (ref 150–400)
RBC # BLD: 4.08 M/UL — SIGNIFICANT CHANGE UP (ref 3.8–5.2)
RBC # FLD: 12.7 % — SIGNIFICANT CHANGE UP (ref 10.3–14.5)
WBC # BLD: 5.51 K/UL — SIGNIFICANT CHANGE UP (ref 3.8–10.5)
WBC # FLD AUTO: 5.51 K/UL — SIGNIFICANT CHANGE UP (ref 3.8–10.5)

## 2021-12-09 NOTE — HISTORY OF PRESENT ILLNESS
[Patient Declined  Services] : - None: Patient declined  services [FreeTextEntry8] : 31 F PMH of COVID last year presents to the clinic for an acute visit of HA and stomachache. \par \par #L. Chest pain Arm  \par --pain started 2 weeks of L side chest pain and SOB occurring through the day but worse at night \par --no pain on exertion  and occurs mostly when laying down  \par --numbness and tingling of the whole arm  \par --duration of more than 1 hour, usually just ends up falling asleep at night\par --has not taken anything for pain \par --mentions she had similar symptoms in the ED April 2020 , had EKG done and they said seen something alarming and referred her to go to cardiologist. However, missed appt and is now worried about sensation.  \par --Jan 2021 COVID infection \par  \par #Productive cough \par --yellowish/greenish phlegm  but no nasal congestion or discharge\par --since Jan 2020 had these symptoms triggered by cold weather  \par --SOB with exertion  \par --no fever, chills \par --burning sensation of lungs with cough \par  \par #Stomachache \par --burning sensation in the epigastric area  \par --associated acid reflux, nausea, HA  \par --food: morning toast, ham sandwich, rice and chicken, no fried foods or food with tomato sauce\par --usually associated with eating \par --pt also admits to taking oral medication for onychomycosis (terbinafine?) with worsening abdominal pain - pt discontinued it 4 months ago \par \par #HCM \par --COVID vaccine- received Moderna  2 doses\par --Influenza Vaccine-this year did it at pharmacy \par --TDap: done this year  at pharmacy  [FreeTextEntry3] : 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

## 2021-12-09 NOTE — END OF VISIT
[] : Resident [FreeTextEntry3] : 32yo F with PMhx of COVID-19 infxn in Jan 2021 who presents w/ left-sided chest pain and SOB since COVID-19. Some exertional SOB, however sx occur mostly at nighttime with associated productive cough. Has previously gone to ED, reports she was referred to cardiology however cannot find evidence of this in chart. Also complaining of a few months of epigastric pain--did take her boyfriends PO med for onchycomycosis around the same time. CP exam notable for tenderness of the chest well and epigastric/RUQ tenderness. ECG in clinic notable for sinus bradycardia. CP/SOB differential: Feel most likely MSK v. reactive airway dz vs. poss fibrosis 2/2 COVID. PFTs and CXR, possible post-COVID pulmonology referral. Feel cardiac etiology is highly unlikely given no exertional CP, normal ECG, young age. Epigastric pain ddx: Agree w/ h. tia, obtain CMP/CBC given RUQ pain.

## 2021-12-09 NOTE — PHYSICAL EXAM
[No Acute Distress] : no acute distress [Well Nourished] : well nourished [Well Developed] : well developed [Well-Appearing] : well-appearing [Normal Sclera/Conjunctiva] : normal sclera/conjunctiva [PERRL] : pupils equal round and reactive to light [EOMI] : extraocular movements intact [Normal Outer Ear/Nose] : the outer ears and nose were normal in appearance [Normal Oropharynx] : the oropharynx was normal [No JVD] : no jugular venous distention [No Lymphadenopathy] : no lymphadenopathy [Supple] : supple [Thyroid Normal, No Nodules] : the thyroid was normal and there were no nodules present [No Respiratory Distress] : no respiratory distress  [No Accessory Muscle Use] : no accessory muscle use [Clear to Auscultation] : lungs were clear to auscultation bilaterally [Normal Rate] : normal rate  [Regular Rhythm] : with a regular rhythm [Normal S1, S2] : normal S1 and S2 [No Murmur] : no murmur heard [No Carotid Bruits] : no carotid bruits [No Abdominal Bruit] : a ~M bruit was not heard ~T in the abdomen [No Varicosities] : no varicosities [Pedal Pulses Present] : the pedal pulses are present [No Edema] : there was no peripheral edema [No Palpable Aorta] : no palpable aorta [No Extremity Clubbing/Cyanosis] : no extremity clubbing/cyanosis [Soft] : abdomen soft [Non-distended] : non-distended [No Masses] : no abdominal mass palpated [No HSM] : no HSM [Normal Bowel Sounds] : normal bowel sounds [Normal Posterior Cervical Nodes] : no posterior cervical lymphadenopathy [Normal Anterior Cervical Nodes] : no anterior cervical lymphadenopathy [No CVA Tenderness] : no CVA  tenderness [No Spinal Tenderness] : no spinal tenderness [No Joint Swelling] : no joint swelling [Grossly Normal Strength/Tone] : grossly normal strength/tone [No Rash] : no rash [Coordination Grossly Intact] : coordination grossly intact [No Focal Deficits] : no focal deficits [Normal Gait] : normal gait [Deep Tendon Reflexes (DTR)] : deep tendon reflexes were 2+ and symmetric [Normal Affect] : the affect was normal [Normal Insight/Judgement] : insight and judgment were intact [de-identified] : Pain on palpation of substernal area  [de-identified] : Tender in epigastric area. Berry sign negative

## 2021-12-09 NOTE — REVIEW OF SYSTEMS
[Chest Pain] : chest pain [Shortness Of Breath] : shortness of breath [Cough] : cough [Dyspnea on Exertion] : dyspnea on exertion [Abdominal Pain] : abdominal pain [Nausea] : nausea [Heartburn] : heartburn [Negative] : Heme/Lymph [Lower Ext Edema] : no lower extremity edema [Constipation] : no constipation [Diarrhea] : diarrhea

## 2021-12-09 NOTE — ASSESSMENT
[FreeTextEntry1] : 31 F presents to the clinic for an acute visit of HA and stomachache. \par \par #Chest pain\par --asthma vs reactive airway disease vs cardiac etiology\par --EKG done in clinic which was normal sinus, bradycardic - not concerning for cardiac etiology \par --referral to pulm for possible PFT \par --Chest X-ray to check for any finrosis \par \par #Stomachache \par --GERD vs gastritis 2/2 to H. pylori\par --prescribed famotiodine 20 mg PM  \par -- to not take other people's medication \par --H. Pylori, stool antigen  \par --CMP \par --CBC  \par \par #HCM \par -UTD immunization \par \par RTC in 4-5 weeks for CPE.\par \par Case d/w Dr. Blackburn

## 2021-12-10 DIAGNOSIS — R10.816 EPIGASTRIC ABDOMINAL TENDERNESS: ICD-10-CM

## 2021-12-10 DIAGNOSIS — R07.9 CHEST PAIN, UNSPECIFIED: ICD-10-CM

## 2021-12-10 DIAGNOSIS — B35.1 TINEA UNGUIUM: ICD-10-CM

## 2021-12-10 DIAGNOSIS — R06.02 SHORTNESS OF BREATH: ICD-10-CM

## 2022-01-03 ENCOUNTER — EMERGENCY (EMERGENCY)
Facility: HOSPITAL | Age: 32
LOS: 1 days | Discharge: HOME | End: 2022-01-03
Attending: EMERGENCY MEDICINE
Payer: MEDICAID

## 2022-01-03 VITALS
TEMPERATURE: 99 F | RESPIRATION RATE: 18 BRPM | SYSTOLIC BLOOD PRESSURE: 112 MMHG | OXYGEN SATURATION: 99 % | WEIGHT: 139.99 LBS | DIASTOLIC BLOOD PRESSURE: 72 MMHG | HEART RATE: 76 BPM

## 2022-01-03 PROCEDURE — 99284 EMERGENCY DEPT VISIT MOD MDM: CPT

## 2022-01-03 RX ORDER — FAMOTIDINE 10 MG/ML
20 INJECTION INTRAVENOUS ONCE
Refills: 0 | Status: COMPLETED | OUTPATIENT
Start: 2022-01-03 | End: 2022-01-03

## 2022-01-03 RX ORDER — DIPHENHYDRAMINE HCL 50 MG
50 CAPSULE ORAL ONCE
Refills: 0 | Status: COMPLETED | OUTPATIENT
Start: 2022-01-03 | End: 2022-01-03

## 2022-01-03 RX ORDER — HYDROXYZINE HCL 10 MG
25 TABLET ORAL ONCE
Refills: 0 | Status: COMPLETED | OUTPATIENT
Start: 2022-01-03 | End: 2022-01-03

## 2022-01-03 RX ORDER — HYDROXYZINE HCL 10 MG
1 TABLET ORAL
Qty: 5 | Refills: 0
Start: 2022-01-03 | End: 2022-01-07

## 2022-01-03 RX ADMIN — Medication 25 MILLIGRAM(S): at 19:50

## 2022-01-03 RX ADMIN — Medication 50 MILLIGRAM(S): at 18:47

## 2022-01-03 RX ADMIN — FAMOTIDINE 20 MILLIGRAM(S): 10 INJECTION INTRAVENOUS at 18:35

## 2022-01-03 RX ADMIN — Medication 50 MILLIGRAM(S): at 18:34

## 2022-01-03 NOTE — ED PROVIDER NOTE - CLINICAL SUMMARY MEDICAL DECISION MAKING FREE TEXT BOX
30 yo F no pmhx p/w 2 days of rash. VSS no lip tongue throat swelling or erythema, lungs CTAB abd non tender. Urticarial rash diffuse over body. VSS no SOB increased WOB. Concern for allergic rxn. Low concern for anaphylaxis. Will give steroids benadryl and pepcid and reevaluate.

## 2022-01-03 NOTE — ED PROVIDER NOTE - PHYSICAL EXAMINATION
Gen: NAD, non-toxic appearing  Head: normal appearing  HEENT: no lip tongue or throat swelling   Lung: no respiratory distress, speaking in full sentences, CTA b/l no wheezes  CV: regular rate and rhythm, no murmurs  Abd: soft, non distended, non tender  MSK: no visible deformities  Neuro: No focal deficits, AAOx3  Skin: diffuse urticarial erythematous rash over chest abd arms and legs.   Psych: normal affect

## 2022-01-03 NOTE — ED PROVIDER NOTE - ATTENDING CONTRIBUTION TO CARE
Attending Statement (RUBY Deleon MD):    HPI: 30y/o F with no reported medical comorbidities, presenting with 2 days of itching rash over extremities and torso, started after eating at a party (had crab - which she has had without problems in past).  Rash not improving with benadryl or topical cortisone cream.  No fever, no vomiting; +diarrhea, + abdominal cramps and feels a little short of breath but not out of breath or having difficulty swallowing.    Review of Systems:  -General: no fever or chills  -ENT: no congestion, no difficulty swallowing  -Pulmonary: no cough, no shortness of breath  -Cardiac: no chest pain, no palpitations  -Gastrointestinal: no abdominal pain, no nausea, no vomiting, and no diarrhea.  -Genitourinary: no blood or pain with urination  -Musculoskeletal: no back or neck pain  -Skin: no rashes  -Endocrine: No h/o diabetes or thyroid disease  -Neurologic: No new weakness or numbness in extremities    All else negative unless otherwise specified elsewhere in this note.    PSH/PMH as noted above    On Physical Exam:  General: well appearing, in NAD, speaking clearly in full sentences and without difficulty; cooperative with exam  HEENT: PERRL, MMM  Neck: no neck tenderness, no nuchal rigidity  Cardiac: normal s1, s2; RRR; no MGR  Lungs: CTABL  Abdomen: soft nontender/nondistended  : no bladder tenderness or distension  Skin: intact, no rash  Extremities: no peripheral edema, no gross deformities  Neuro: no gross neurologic deficits    MDM: Attending Statement (RUBY Deleon MD):    HPI: 32y/o F with no reported medical comorbidities, presenting with 2 days of itching rash over extremities and torso, started after eating at a party (had crab - which she has had without problems in past).  Rash not improving with benadryl or topical cortisone cream.  No fever, no vomiting; +diarrhea, + abdominal cramps and feels a little short of breath but not out of breath or having difficulty swallowing.    Review of Systems:  -General: no fever or chills  -ENT: no congestion, no difficulty swallowing  -Pulmonary: no cough, no shortness of breath  -Cardiac: no chest pain, no palpitations  -Gastrointestinal: no abdominal pain, no nausea, no vomiting, and no diarrhea.  -Genitourinary: no blood or pain with urination  -Musculoskeletal: no back or neck pain  -Skin: +rash  -Endocrine: No h/o diabetes   -Neurologic: No new weakness or numbness in extremities    All else negative unless otherwise specified elsewhere in this note.    PSH/PMH as noted above    On Physical Exam:  General: well appearing, in NAD, speaking clearly in full sentences and without difficulty; cooperative with exam  HEENT: airway patent; normal appearing tongue, no tongue swelling/elevation; normal appearing posterior pharynx, no uvula/tonsilar enlargement or deviation  Neck: no neck swelling, no tenderness, no thyromegaly appreciated  Cardiac: normal s1, s2; RRR; no MGR  Lungs: CTABL  Abdomen: soft nontender/nondistended  : no bladder tenderness or distension  Skin: intact, no rash  Extremities: no peripheral edema, no gross deformities    MDM: Likely allergic reaction; objectively only cutaneous eruption but some subjective SOB and some reported GI symptoms; however, overall ont appearing acutely ill or with concern for airway compromise (low suspicion for anaphylaxis/shock; vitals stable). Will treat with benadryl, pepcid and steroids (prednisone, 40mg now and for additional 4 days starting tomorrow).

## 2022-01-03 NOTE — ED PROVIDER NOTE - OBJECTIVE STATEMENT
30 yo F no sig pmhx p/w 2 days of itching. Pt reports started 2 days ago after eating at a party. The rash is diffuse all over her body and very itchy. Has not responded to benadryl and cortisone cream. She also had throat tightness and SOB that has resolved. pt reports abd pain that is in the periumbilical region with no radiation. Not sure which food she is allergic to. no hx of allergic rxns. No new meds soap/detergent perfume. Denies HA f/c cp n/v.

## 2022-01-03 NOTE — ED PROVIDER NOTE - NSFOLLOWUPCLINICS_GEN_ALL_ED_FT
[facility] All[facility] and Immunology  Allergy  [address] [place] [address]  Phone: [phone]  Fax:

## 2022-01-03 NOTE — ED PROVIDER NOTE - PATIENT PORTAL LINK FT
You can access the FollowMyHealth Patient Portal offered by [facility] by registering at the following website: [email or web address]. By joining [employer]’s FollowMyHealth portal, you will also be able to view your health information using other applications (apps) compatible with our system.

## 2022-01-03 NOTE — ED ADULT NURSE NOTE - OBJECTIVE STATEMENT
patient came in complaining of generalized itchy rash all over body since 1/1 & was taking benadryl 2 tabs 2 x a day till yesterday & none today. (+) mild sob. Denies throat discomfort. Unaware of what was she reacting to.

## 2022-01-04 ENCOUNTER — OUTPATIENT (OUTPATIENT)
Dept: OUTPATIENT SERVICES | Facility: HOSPITAL | Age: 32
LOS: 1 days | End: 2022-01-04
Payer: COMMERCIAL

## 2022-01-04 ENCOUNTER — APPOINTMENT (OUTPATIENT)
Dept: RADIOLOGY | Facility: CLINIC | Age: 32
End: 2022-01-04
Payer: COMMERCIAL

## 2022-01-04 DIAGNOSIS — R07.9 CHEST PAIN, UNSPECIFIED: ICD-10-CM

## 2022-01-04 DIAGNOSIS — O00.90 UNSPECIFIED ECTOPIC PREGNANCY WITHOUT INTRAUTERINE PREGNANCY: Chronic | ICD-10-CM

## 2022-01-04 PROCEDURE — 71045 X-RAY EXAM CHEST 1 VIEW: CPT

## 2022-01-04 PROCEDURE — 71045 X-RAY EXAM CHEST 1 VIEW: CPT | Mod: 26

## 2022-01-10 ENCOUNTER — EMERGENCY (EMERGENCY)
Facility: HOSPITAL | Age: 32
LOS: 1 days | Discharge: HOME | End: 2022-01-10
Attending: EMERGENCY MEDICINE
Payer: MEDICAID

## 2022-01-10 VITALS
HEART RATE: 72 BPM | TEMPERATURE: 98 F | WEIGHT: 147.05 LBS | DIASTOLIC BLOOD PRESSURE: 82 MMHG | HEIGHT: 64 IN | OXYGEN SATURATION: 99 % | SYSTOLIC BLOOD PRESSURE: 117 MMHG | RESPIRATION RATE: 18 BRPM

## 2022-01-10 PROCEDURE — 99053 MED SERV 10PM-8AM 24 HR FAC: CPT

## 2022-01-10 PROCEDURE — 99284 EMERGENCY DEPT VISIT MOD MDM: CPT

## 2022-01-10 NOTE — ED ADULT TRIAGE NOTE - BMI (KG/M2)
"Chief Complaint   Patient presents with     Diabetes       Initial /60  Pulse 68  Temp 97.6  F (36.4  C) (Tympanic)  Resp 14  Ht 5' 10\" (1.778 m)  Wt 191 lb (86.6 kg)  BMI 27.41 kg/m2 Estimated body mass index is 27.41 kg/(m^2) as calculated from the following:    Height as of this encounter: 5' 10\" (1.778 m).    Weight as of this encounter: 191 lb (86.6 kg).  Medication Reconciliation: complete     Mariia Allen CMA      "
25.2

## 2022-01-11 ENCOUNTER — EMERGENCY (EMERGENCY)
Facility: HOSPITAL | Age: 32
LOS: 1 days | Discharge: HOME | End: 2022-01-11
Attending: EMERGENCY MEDICINE
Payer: MEDICAID

## 2022-01-11 VITALS
OXYGEN SATURATION: 97 % | SYSTOLIC BLOOD PRESSURE: 97 MMHG | RESPIRATION RATE: 16 BRPM | HEART RATE: 74 BPM | DIASTOLIC BLOOD PRESSURE: 61 MMHG | TEMPERATURE: 99 F

## 2022-01-11 VITALS
OXYGEN SATURATION: 99 % | HEIGHT: 64 IN | HEART RATE: 71 BPM | TEMPERATURE: 98 F | RESPIRATION RATE: 18 BRPM | DIASTOLIC BLOOD PRESSURE: 84 MMHG | SYSTOLIC BLOOD PRESSURE: 124 MMHG | WEIGHT: 147.05 LBS

## 2022-01-11 LAB
ALBUMIN SERPL ELPH-MCNC: 4.3 G/DL — SIGNIFICANT CHANGE UP (ref 3.3–5)
ALP SERPL-CCNC: 81 U/L — SIGNIFICANT CHANGE UP (ref 40–120)
ALT FLD-CCNC: 164 U/L — HIGH (ref 10–45)
ANION GAP SERPL CALC-SCNC: 11 MMOL/L — SIGNIFICANT CHANGE UP (ref 5–17)
AST SERPL-CCNC: 152 U/L — HIGH (ref 10–40)
BASOPHILS # BLD AUTO: 0.02 K/UL — SIGNIFICANT CHANGE UP (ref 0–0.2)
BASOPHILS NFR BLD AUTO: 0.3 % — SIGNIFICANT CHANGE UP (ref 0–2)
BILIRUB SERPL-MCNC: 0.2 MG/DL — SIGNIFICANT CHANGE UP (ref 0.2–1.2)
BUN SERPL-MCNC: 16 MG/DL — SIGNIFICANT CHANGE UP (ref 7–23)
CALCIUM SERPL-MCNC: 9 MG/DL — SIGNIFICANT CHANGE UP (ref 8.4–10.5)
CHLORIDE SERPL-SCNC: 106 MMOL/L — SIGNIFICANT CHANGE UP (ref 96–108)
CO2 SERPL-SCNC: 21 MMOL/L — LOW (ref 22–31)
CREAT SERPL-MCNC: 0.6 MG/DL — SIGNIFICANT CHANGE UP (ref 0.5–1.3)
EOSINOPHIL # BLD AUTO: 0.11 K/UL — SIGNIFICANT CHANGE UP (ref 0–0.5)
EOSINOPHIL NFR BLD AUTO: 1.8 % — SIGNIFICANT CHANGE UP (ref 0–6)
GLUCOSE SERPL-MCNC: 99 MG/DL — SIGNIFICANT CHANGE UP (ref 70–99)
HCG SERPL-ACNC: <2 MIU/ML — SIGNIFICANT CHANGE UP
HCT VFR BLD CALC: 39.5 % — SIGNIFICANT CHANGE UP (ref 34.5–45)
HGB BLD-MCNC: 13.2 G/DL — SIGNIFICANT CHANGE UP (ref 11.5–15.5)
IMM GRANULOCYTES NFR BLD AUTO: 0.2 % — SIGNIFICANT CHANGE UP (ref 0–1.5)
LYMPHOCYTES # BLD AUTO: 2.6 K/UL — SIGNIFICANT CHANGE UP (ref 1–3.3)
LYMPHOCYTES # BLD AUTO: 42.9 % — SIGNIFICANT CHANGE UP (ref 13–44)
MCHC RBC-ENTMCNC: 31.4 PG — SIGNIFICANT CHANGE UP (ref 27–34)
MCHC RBC-ENTMCNC: 33.4 GM/DL — SIGNIFICANT CHANGE UP (ref 32–36)
MCV RBC AUTO: 93.8 FL — SIGNIFICANT CHANGE UP (ref 80–100)
MONOCYTES # BLD AUTO: 0.44 K/UL — SIGNIFICANT CHANGE UP (ref 0–0.9)
MONOCYTES NFR BLD AUTO: 7.3 % — SIGNIFICANT CHANGE UP (ref 2–14)
NEUTROPHILS # BLD AUTO: 2.88 K/UL — SIGNIFICANT CHANGE UP (ref 1.8–7.4)
NEUTROPHILS NFR BLD AUTO: 47.5 % — SIGNIFICANT CHANGE UP (ref 43–77)
NRBC # BLD: 0 /100 WBCS — SIGNIFICANT CHANGE UP (ref 0–0)
PLATELET # BLD AUTO: 173 K/UL — SIGNIFICANT CHANGE UP (ref 150–400)
POTASSIUM SERPL-MCNC: 4.2 MMOL/L — SIGNIFICANT CHANGE UP (ref 3.5–5.3)
POTASSIUM SERPL-SCNC: 4.2 MMOL/L — SIGNIFICANT CHANGE UP (ref 3.5–5.3)
PROT SERPL-MCNC: 6.9 G/DL — SIGNIFICANT CHANGE UP (ref 6–8.3)
RBC # BLD: 4.21 M/UL — SIGNIFICANT CHANGE UP (ref 3.8–5.2)
RBC # FLD: 12.2 % — SIGNIFICANT CHANGE UP (ref 10.3–14.5)
SODIUM SERPL-SCNC: 138 MMOL/L — SIGNIFICANT CHANGE UP (ref 135–145)
WBC # BLD: 6.06 K/UL — SIGNIFICANT CHANGE UP (ref 3.8–10.5)
WBC # FLD AUTO: 6.06 K/UL — SIGNIFICANT CHANGE UP (ref 3.8–10.5)

## 2022-01-11 PROCEDURE — 99283 EMERGENCY DEPT VISIT LOW MDM: CPT

## 2022-01-11 RX ORDER — ONDANSETRON 8 MG/1
4 TABLET, FILM COATED ORAL ONCE
Refills: 0 | Status: COMPLETED | OUTPATIENT
Start: 2022-01-11 | End: 2022-01-11

## 2022-01-11 RX ORDER — DIPHENHYDRAMINE HCL 50 MG
25 CAPSULE ORAL ONCE
Refills: 0 | Status: COMPLETED | OUTPATIENT
Start: 2022-01-11 | End: 2022-01-11

## 2022-01-11 RX ORDER — DEXAMETHASONE 0.5 MG/5ML
10 ELIXIR ORAL ONCE
Refills: 0 | Status: COMPLETED | OUTPATIENT
Start: 2022-01-11 | End: 2022-01-11

## 2022-01-11 RX ORDER — FAMOTIDINE 10 MG/ML
40 INJECTION INTRAVENOUS ONCE
Refills: 0 | Status: DISCONTINUED | OUTPATIENT
Start: 2022-01-11 | End: 2022-01-11

## 2022-01-11 RX ORDER — FAMOTIDINE 10 MG/ML
20 INJECTION INTRAVENOUS ONCE
Refills: 0 | Status: COMPLETED | OUTPATIENT
Start: 2022-01-11 | End: 2022-01-11

## 2022-01-11 RX ORDER — FAMOTIDINE 10 MG/ML
20 INJECTION INTRAVENOUS DAILY
Refills: 0 | Status: DISCONTINUED | OUTPATIENT
Start: 2022-01-11 | End: 2022-01-11

## 2022-01-11 RX ADMIN — ONDANSETRON 4 MILLIGRAM(S): 8 TABLET, FILM COATED ORAL at 00:55

## 2022-01-11 RX ADMIN — Medication 25 MILLIGRAM(S): at 23:59

## 2022-01-11 RX ADMIN — FAMOTIDINE 20 MILLIGRAM(S): 10 INJECTION INTRAVENOUS at 23:59

## 2022-01-11 RX ADMIN — FAMOTIDINE 20 MILLIGRAM(S): 10 INJECTION INTRAVENOUS at 00:55

## 2022-01-11 RX ADMIN — Medication 10 MILLIGRAM(S): at 00:55

## 2022-01-11 NOTE — ED PROVIDER NOTE - NSFOLLOWUPCLINICS_GEN_ALL_ED_FT
[facility] Allergy and Immunology  Allergy  [address] [place] [address]  Phone: [phone]  Fax:   Follow Up Time: Urgent

## 2022-01-11 NOTE — ED PROVIDER NOTE - PHYSICAL EXAMINATION
GENERAL: no acute distress, non-toxic appearing  HEENT: normal conjunctiva, oral mucosa moist, no tongue swelling, no stridor  CARDIAC: regular rate and regular rhythm, bp reassuriong  PULM: clear to ascultation bilaterally, no wheezes, no incr wob  GI: abdomen nondistended, soft, nontender, negative murphys sign  : no suprapubic tenderness  NEURO: alert and oriented x 3, normal speech, moving all extremities without lateralization  MSK: no visible deformities, no peripheral edema  SKIN: diffuse hives, no palmar/plantar lesions  PSYCH: appropriate mood and affect

## 2022-01-11 NOTE — ED PROVIDER NOTE - CLINICAL SUMMARY MEDICAL DECISION MAKING FREE TEXT BOX
Georgi, PGY3: concern for allergic reaction, unclear what etiology, despite pt having episode of emesis low suspicion anaphylaxis, will do labs given pt second visit, meds, anticipate dc with allergist f/u Georgi, PGY3: concern for allergic reaction, unclear what etiology, despite pt having episode of emesis low suspicion anaphylaxis, will do labs given pt second visit, meds, anticipate dc with allergist f/u    Dr. Linton's Note: pt with urticarial rash, minimal GI symptoms, no other systemic involvement. pt improved w allergic cocktail. no resp issues. Patient is safe for d/c with supportive care, return precautions, and outpt f/u w allergy clinic

## 2022-01-11 NOTE — ED ADULT NURSE NOTE - OBJECTIVE STATEMENT
32 YO female with no stated PMH via walk in presenting with complaints of rash. As per patient, since 1/1/2022, pt has had itchiness on face and trunk. Pt states she was seen in the ED 1/3/2022 and as per orders, pt was provided benadryl, pepcid, atarax, and prednisone. Pt states she was prescribed medications for 5 days (in medication review, atarax and prednisone prescribed). Pt was to follow with allergy doctor, but never received a call from allergy office. Pt states that today pt had episode of feeling like she could not breathe. Pt states she has had 7/10 abdominal pain since 1/1/2022. Pt denies chest pain, palpitations, headache, visual disturbances, numbness/tingling, fever, chills, diaphoresis,  nausea, vomiting, constipation, diarrhea, or urinary symptoms.   Pt Axox4, gross neuro intact, PERRL 3 mm. Lungs clear throughout bilateral, respirations even, & non-labored. S1S2 heard, pulses strong and equal bilaterally. Abdomen soft, non-tender, non-distended. Redness seen on face. Pt placed in position of comfort. Pt educated on call bell system and provided call bell. Bed in lowest position, wheels locked, appropriate side rails raised. Pt denies needs at this time.

## 2022-01-11 NOTE — ED PROVIDER NOTE - PATIENT PORTAL LINK FT
You can access the FollowMyHealth Patient Portal offered by [facility] by registering at the following website: [email or web address]. By joining [email or web address]’s FollowMyHealth portal, you will also be able to view your health information using other applications (apps) compatible with our system.

## 2022-01-11 NOTE — ED PROVIDER NOTE - NSFOLLOWUPINSTRUCTIONS_ED_ALL_ED_FT
- Please follow up with your Primary Care Doctor within 72 hours. Bring your results from today.    - Please follow up with Allergist. We made a referral through our Emergency Department for you, you should receive a phone call within 72 hours. If you don't please call office provided.     - You may take Benadryl as needed for itchiness.    - You may also take over the counter Pepcid for nausea --- read package insert for dosing instructions.     - Be sure to return to the ED if you develop new, worsening, or any distressing symptoms.

## 2022-01-11 NOTE — ED ADULT NURSE NOTE - NS_SISCREENINGSR_GEN_ALL_ED
Does patient need to be seen?    ER note printed for Dr. Pablo to review.     See paperwork.    Negative

## 2022-01-11 NOTE — ED PROVIDER NOTE - OBJECTIVE STATEMENT
31F no significant PMH presents to the ED with hives/itchiness for the past day, states she had one episode of vomiting earlier. Took some benadryl with only mild relief. States she had something similar on 1/1, saw medical attention on 1/4 and was given steroids states that steroids helped but finished course 2 days ago, states symptoms came back. States only possible new exposure was crab meat on 1/1. Denies any new detergents, pets, travel, medications, drug use, sexual partners. Didn't see allergist yet.   ID: [id number]

## 2022-01-11 NOTE — ED ADULT NURSE NOTE - OBJECTIVE STATEMENT
31y F arrived to the ED c/o hives. Pt d/c from [facility]  ED this morning for similar symptoms reports "my hives came back and are itchy." Pt states she took benadryl dose in the AM. Pt also reports going to [facility] today and testing positive for covid. Pt denies trouble breathing, SOB, chest pain, abd pain, N/V/D. Pt comfortable appearance at present, denies needs at this time.

## 2022-01-11 NOTE — ED ADULT NURSE NOTE - ED STAT RN HANDOFF DETAILS
Handoff report provided to oncoming nurse Denise RN. Understands pmh, medications given, and plan of care for patient. Patient in stable condition, vital signs updated, and patient has no complaints at this time and has been updated on care plan.

## 2022-01-12 VITALS
OXYGEN SATURATION: 99 % | RESPIRATION RATE: 16 BRPM | DIASTOLIC BLOOD PRESSURE: 61 MMHG | HEART RATE: 75 BPM | SYSTOLIC BLOOD PRESSURE: 107 MMHG | TEMPERATURE: 97 F

## 2022-01-12 NOTE — ED PROVIDER NOTE - NSFOLLOWUPCLINICS_GEN_ALL_ED_FT
[facility] Allergy and Immunology  Allergy  [address] [place] [address]  Phone: [phone]  Fax:

## 2022-01-12 NOTE — ED PROVIDER NOTE - CLINICAL SUMMARY MEDICAL DECISION MAKING FREE TEXT BOX
Concern for hives/itch that has been improving with steroids and benadryl. not taking benadryl at home. No concern for anaphylaxis. follow up with allergist. Return precautions are discussed. WIll treat with benadryl and pepcid at this time.

## 2022-01-12 NOTE — ED ADULT NURSE REASSESSMENT NOTE - NS ED NURSE REASSESS COMMENT FT1
Patient d/c. Reviewed d/c paperwork with patients, all questions answered at this time. Patient verbalizes understanding. Patient instructed to return to the ER for any worsening s/s including chest pain, SOB, fever, n/v/d. Patient alert and stable at time of d/c. Patient will follow up with allergist and instructed to take benadryl and Pepcid at home as needed for the itching and hives.

## 2022-01-12 NOTE — ED PROVIDER NOTE - ATTENDING CONTRIBUTION TO CARE
30 yo female p/w itching/hives intermittently x 10d.  4th ED/urgent care visit for same over that time period.  tested + for COVID today.  no sx of anaphylaxis.  unclear trigger.  outpatient allergy referral reinforced.

## 2022-01-12 NOTE — ED PROVIDER NOTE - OBJECTIVE STATEMENT
30yo F with no pmhx presents with itchiness of past 10ds. States sxs started with hives. Now only have hives on face with persistent itchiness of the body. Was seen here twice. First when sxs started and was sent home with prednisone. Again about 12 hours ago and was given decadron. Did not take benadryl at home since AL'ed. States she returned because she woke up with itchiness again. No resp/GI sxs, fever, chills, cough, chest pain. Did not see any allergist yet. No hx of of allergies in the past. did not use any proiducts.

## 2022-01-12 NOTE — ED PROVIDER NOTE - NSFOLLOWUPINSTRUCTIONS_ED_ALL_ED_FT
You were seen for itchiness and rash. Please follow up with an allergist for continue care.     Take 25mg of benadryl over the counter every 6 hours as needed for itchiness. This medication can make you DROWSY - please do NOT drive, use heavy machinery after taking this medication.    You may also take pepcid 20 mg twice a day as needed for itchiness in addition.     If you start developing chest pain, shortness of breath, throat closing sensation, wheezing or any other concerning symptoms, please seek medical assistance.    If needed, call patient access services at [phone] to find a primary care doctor, or call at [phone] to make an appointment at the clinic.

## 2022-01-12 NOTE — ED PROVIDER NOTE - PATIENT PORTAL LINK FT
You can access the FollowMyHealth Patient Portal offered by [facility] by registering at the following website: [email or web address]. By joining [employer]’s FollowMyHealth portal, you will also be able to view your health information using other applications (apps) compatible with our system.

## 2022-01-12 NOTE — ED PROVIDER NOTE - PHYSICAL EXAMINATION
Gen: non toxic appearing, NAD   Head: NC/NT  Eyes:  anicteric  ENT: airway patent, mmm, oral cavity and pharynx normal. No inflammation, swelling, exudate, or lesions.   CV: RRR, +S1/S2  Resp: CTAB, symmetric breath sounds, no W/R/R  GI:  abdomen soft non-distended, NTTP  Extremities - no edema  Skin: hives noted on the face, no hives on extremities, abdomen/back  Neuro: A&Ox4

## 2022-01-13 PROBLEM — Z00.00 ENCOUNTER FOR PREVENTIVE HEALTH EXAMINATION: Status: ACTIVE | Noted: 2022-01-13

## 2022-01-21 ENCOUNTER — APPOINTMENT (OUTPATIENT)
Dept: PEDIATRIC ALLERGY IMMUNOLOGY | Facility: CLINIC | Age: 32
End: 2022-01-21
Payer: COMMERCIAL

## 2022-01-21 ENCOUNTER — LABORATORY RESULT (OUTPATIENT)
Age: 32
End: 2022-01-21

## 2022-01-21 VITALS
BODY MASS INDEX: 27.49 KG/M2 | HEART RATE: 86 BPM | OXYGEN SATURATION: 95 % | DIASTOLIC BLOOD PRESSURE: 74 MMHG | WEIGHT: 161 LBS | HEIGHT: 64 IN | SYSTOLIC BLOOD PRESSURE: 109 MMHG | TEMPERATURE: 97.16 F

## 2022-01-21 DIAGNOSIS — Z87.59 PERSONAL HISTORY OF OTHER COMPLICATIONS OF PREGNANCY, CHILDBIRTH AND THE PUERPERIUM: ICD-10-CM

## 2022-01-21 DIAGNOSIS — L50.9 URTICARIA, UNSPECIFIED: ICD-10-CM

## 2022-01-21 DIAGNOSIS — L50.8 OTHER URTICARIA: ICD-10-CM

## 2022-01-21 LAB
ALBUMIN SERPL ELPH-MCNC: 4.5 G/DL
ALP BLD-CCNC: 70 U/L
ALT SERPL-CCNC: 95 U/L
ANION GAP SERPL CALC-SCNC: 12 MMOL/L
AST SERPL-CCNC: 55 U/L
BASOPHILS # BLD AUTO: 0.02 K/UL
BASOPHILS NFR BLD AUTO: 0.4 %
BILIRUB SERPL-MCNC: 0.3 MG/DL
BUN SERPL-MCNC: 16 MG/DL
CALCIUM SERPL-MCNC: 9.3 MG/DL
CHLORIDE SERPL-SCNC: 106 MMOL/L
CO2 SERPL-SCNC: 23 MMOL/L
CREAT SERPL-MCNC: 0.62 MG/DL
EOSINOPHIL # BLD AUTO: 0.08 K/UL
EOSINOPHIL NFR BLD AUTO: 1.7 %
GLUCOSE SERPL-MCNC: 89 MG/DL
HCT VFR BLD CALC: 39.8 %
HGB BLD-MCNC: 12.9 G/DL
IMM GRANULOCYTES NFR BLD AUTO: 0.2 %
LYMPHOCYTES # BLD AUTO: 1.79 K/UL
LYMPHOCYTES NFR BLD AUTO: 38.2 %
MAN DIFF?: NORMAL
MCHC RBC-ENTMCNC: 30.5 PG
MCHC RBC-ENTMCNC: 32.4 GM/DL
MCV RBC AUTO: 94.1 FL
MONOCYTES # BLD AUTO: 0.32 K/UL
MONOCYTES NFR BLD AUTO: 6.8 %
NEUTROPHILS # BLD AUTO: 2.47 K/UL
NEUTROPHILS NFR BLD AUTO: 52.7 %
PLATELET # BLD AUTO: 181 K/UL
POTASSIUM SERPL-SCNC: 4 MMOL/L
PROT SERPL-MCNC: 7 G/DL
RBC # BLD: 4.23 M/UL
RBC # FLD: 12.8 %
SODIUM SERPL-SCNC: 140 MMOL/L
TSH SERPL-ACNC: 0.82 UIU/ML
WBC # FLD AUTO: 4.69 K/UL

## 2022-01-21 PROCEDURE — 99204 OFFICE O/P NEW MOD 45 MIN: CPT | Mod: 25

## 2022-01-21 RX ORDER — LEVOCETIRIZINE DIHYDROCHLORIDE 5 MG/1
5 TABLET ORAL
Qty: 30 | Refills: 2 | Status: ACTIVE | COMMUNITY
Start: 2022-01-21 | End: 1900-01-01

## 2022-01-24 PROBLEM — L50.9 HIVES: Status: ACTIVE | Noted: 2022-01-24

## 2022-01-24 LAB
C3 SERPL-MCNC: 105 MG/DL
ERYTHROCYTE [SEDIMENTATION RATE] IN BLOOD BY WESTERGREN METHOD: 10 MM/HR
TRYPTASE: 2.4 UG/L

## 2022-02-01 LAB — C2 SERPL-MCNC: 2.2 MG/DL

## 2022-02-02 ENCOUNTER — APPOINTMENT (OUTPATIENT)
Dept: PULMONOLOGY | Facility: CLINIC | Age: 32
End: 2022-02-02

## 2022-03-29 NOTE — ED ADULT NURSE NOTE - CAS TRG GEN SKIN COLOR
3/29/2022       RE: Josette Gregg  [address]  [facility] [address]     Dear Colleague,    Thank you for referring your patient, Josette Gregg, to the [facility] EAR NOSE AND THRO[facility] CLINIC [place] at [facility]. Please see a copy of my visit note below.        [facility]   at the [facility]   Otolaryngology Clinic     Patient: Josette Gregg    MRN: [id number]    : 1964    Age/Gender: 58 year old female  Date of Service: 3/29/2022  Rendering Provider:   Daniela Rueda MD     Referring Provider   PCP: Jessica Hogue  Referring Physician: Jessica Hogue MD  [address] [address]  Reason for Consultation   Globus sensation  History   HISTORY OF PRESENT ILLNESS: I was asked to consult on Josette Gregg, by Dr. Hogue for evaluation of globus. Ms. Gregg is a 58 year old female who presents to us today for consultation.      Of note, the patient works in the OR nurse here at [employer].    she presents today for evaluation. she reports:    Globus  - sudden onset of a sore throat a few weeks ago  - sore throat lasted one day  - is more pronounced after eating or in the evenings when she lays down  - not worse in the morning  - does not have difficulty swallowing or food getting stuck  - endorses occasional lymph node swelling under her neck  - also endorses occasional bilateral ear pain  - denies snoring or teeth grinding    Dysphonia  - hoarseness for the last week   - can become more raspy with use  - high voice use in the OR  - no change in amount of voice use in the last few months  - no change in any stress or hours of work in the last few months  - tells  that she cannot talk  - was at a  3 days ago and felt like she could not sing  - has never happened before  - voice quality has not changed, just fatigue  - today feels like a normal voice    Dysphagia  - had  difficulty swallowing Advil 2 days ago  - does not normally take pills  - otherwise denies  - no coughing or choking with food    Dyspnea  - denies    Throat clearing/cough  - denies  - none in the morning    GERD/LPRD   - does not feel indigestion  - no sore throat in the morning    PAST MEDICAL HISTORY:   Past Medical History:   Diagnosis Date     Congenital hip dysplasia     breech, treated     Left shoulder pain 7/16/2012     PAST SURGICAL HISTORY:   Past Surgical History:   Procedure Laterality Date     ARTHROPLASTY MINIMALLY INVASIVE HIP Left 2/16/2015    Procedure: ARTHROPLASTY MINIMALLY INVASIVE HIP;  Surgeon: Edson Oviedo MD;  Location:  OR     ARTHROSCOPY SHOULDER, OPEN BICEP TENODESIS REPAIR, COMBINED  4/9/2014    Procedure: COMBINED ARTHROSCOPY SHOULDER, OPEN BICEP TENODESIS REPAIR;;  Surgeon: Josh Love MD;  Location: [facility]     ARTHROSCOPY SHOULDER, OPEN ROTATOR CUFF REPAIR, COMBINED  4/9/2014    Procedure: COMBINED ARTHROSCOPY SHOULDER, OPEN ROTATOR CUFF REPAIR;  Diagnostic Left SHOULDER ARTHROSCOPY,  OPEN ROTATOR CUFF REPAIR, Biceps Tenodesis;  Surgeon: Josh Love MD;  Location: [facility]     GYN SURGERY       HYSTERECTOMY  1/2008     ORTHOPEDIC SURGERY       US JOINT INJECTION ASPIRATION MAJOR RIGHT  11/3/2010    left shoulder injection Nickerson see scan     CURRENT MEDICATIONS:   Current Outpatient Medications:      Multiple Vitamin (MULTIVITAMINS PO), Take 1 tablet by mouth daily, Disp: , Rfl:     ALLERGIES: Patient has no known allergies.    SOCIAL HISTORY:    Social History     Socioeconomic History     Marital status:      Spouse name: Not on file     Number of children: 1     Years of education: Not on file     Highest education level: Not on file   Occupational History     Not on file   Tobacco Use     Smoking status: Never Smoker     Smokeless tobacco: Never Used   Substance and Sexual Activity     Alcohol use: Yes     Comment: occasional     Drug use: No      Sexual activity: Yes     Partners: Male     Birth control/protection: Surgical   Other Topics Concern     Parent/sibling w/ CABG, MI or angioplasty before 65F 55M? No   Social History Narrative     Not on file     Social Determinants of Health     Financial Resource Strain: Not on file   Food Insecurity: Not on file   Transportation Needs: Not on file   Physical Activity: Not on file   Stress: Not on file   Social Connections: Not on file   Intimate Partner Violence: Not on file   Housing Stability: Not on file       FAMILY HISTORY:   Family History   Problem Relation Age of Onset     Rheumatoid Arthritis Mother      Melanoma No family hx of      Skin Cancer No family hx of      Non-contributory for problems with anesthesia    REVIEW OF SYSTEMS:   The patient was asked a 14 point review of systems regarding constitutional symptoms, eye symptoms, ears, nose, mouth, throat symptoms, cardiovascular symptoms, respiratory symptoms, gastrointestinal symptoms, genitourinary symptoms, musculoskeletal symptoms, integumentary symptoms, neurological symptoms, psychiatric symptoms, endocrine symptoms, hematologic/lymphatic symptoms, and allergic/ immunologic symptoms.   The pertinent factors have been included in the HPI and below.  Patient Supplied Answers to Review of Systems  [facility] ROS 3/23/2022   Ears, Nose, Throat Ear pain     Physical Examination   The patient underwent a physical examination as described below. The pertinent positive and negative findings are summarized after the description of the examination.  Constitutional: The patient's developmental and nutritional status was assessed. The patient's voice quality was assessed.  Head and Face: The head and face were inspected for deformities. The sinuses were palpated. The salivary glands were palpated. Facial muscle strength was assessed bilaterally.  Eyes: Extraocular movements and primary gaze alignment were assessed.  Ears, Nose, Mouth and Throat: The ears and  nose were examined for deformities. The nasal septum, mucosa, and turbinates were inspected by anterior rhinoscopy. The lips, teeth, and gums were examined for abnormalities. The oral mucosa, tongue, palate, tonsils, lateral and posterior pharynx were inspected for the presence of asymmetry or mucosal lesions.    Neck: The tracheal position was noted, and the neck mass palpated to determine if there were any asymmetries, abnormal neck masses, thyromegally, or thyroid nodules.  Respiratory: The nature of the breathing and chest expansion/symmetry was observed.  Cardiovascular: The patient was examined to determine the presence of any edema or jugular venous distension.  Abdomen: The contour of the abdomen was noted.  Lymphatic: The patient was examined for infraclavicular lymphadenopathy.  Musculoskeletal: The patient was inspected for the presence of skeletal deformities.  Extremities: The extremities were examined for any clubbing or cyanosis.  Skin: The skin was examined for inflammatory or neoplastic conditions.  Neurologic: The patient's orientation, mood, and affect were noted. The cranial nerve  functions were examined.  Other pertinent positive and negative findings on physical examination:      OC/OP: no lesions, uvula midline, soft palate elevates symmetrically Neck: no lesions, bilateral TH tenderness to palpation, mild need to cough with TH pressure     All other physical examination findings were within normal limits and noncontributory.  Procedures   BEHAVIORAL & QUALITATIVE EVALUATION OF VOICE AND RESONENCE   Comments: F0 212 Hz  MPT: 20 seconds  Vocal Quality: Normal    Pitch Range:  Normal 155 - 460 Hz  Phrase Length:  Normal  Vocal Loudness: Normal  Dysarthria: No    Flexible laryngoscopy (CPT 05915)      Pre-procedure diagnosis: globus sensation   Post-procedure diagnosis: same as above  Indication for procedure: Ms. Gregg is a 58 year old female with see above  Procedure(s): Fiberoptic  Laryngoscopy    Details of Procedure: After informed consent was obtained, the patient was seated in the examination chair.  The areas of the nasopharynx as well as the hypopharynx were anesthetized with topical 4% lidocaine with 0.25% phenylephrine atomizer.  Examination of the base of tongue was performed first.  Attention was directed to any evidence of masses in the area or evidence of leukoplakia or candidal infection.  Attention was directed to the epiglottis where its size and position was determined and its movement on phonation of the vowel  e .  The piriform sinuses were then inspected for any mass lesions or pooling of secretions.  Attention was then directed to the larynx. The vocal folds were inspected for infection or any areas of leukoplakia, for masses, polypoid degeneration, or hemorrhage.  Having done this, the arytenoids and vocal processes were inspected for erythema or evidence of granuloma formation.  The posterior commissure was then inspected for evidence of inflammatory changes in the mucosa and heaping up of mucosal tissue. The patient was then instructed to say the vowel  e .  Adduction of vocal folds to the midline was observed for any evidence of paresis or paralysis of the larynx or asymmetry in rotation of the larynx to the left or right. The patient was asked to breathe and the degree of abduction was noted bilaterally.  Subglottic view of the larynx was obtained for any additional mass lesions or mucosal changes.  Finally the post cricoid was examined for evidence of pooling of secretions, as well as the pharyngeal wall mucosa.   Anesthesia type: 0.25% phenylephrine    Findings:  Anatomic/physiological deviations: RNC, supraglottic hyperfunction, no pooling of saliva, postcricoid edema, right and left arytenoids at times during adduction appear asymmetric, however, abduction is normal and well coordinated   Right vocal process: No restriction of mobility   Left vocal process: No  "restriction of mobility  Glottal gap: Complete glottal closure  Supraglottic structures: Normal  Hypopharynx: Normal     Estimated Blood Loss: minimal  Complications: None  Disposition: Patient tolerated the procedure well              Review of Relevant Clinical Data   I personally reviewed:  Notes: Jessica Kebede - PCP 3/14/22    Labs:  No results found for: TSH  Lab Results   Component Value Date     02/18/2015    CO2 30 02/18/2015    BUN 10 02/18/2015    PHOS 3.6 02/20/2015     Lab Results   Component Value Date    WBC 12.6 (H) 02/17/2015    HGB 10.0 (L) 02/19/2015    HCT 33.0 (L) 02/17/2015    MCV 90 02/17/2015     (L) 02/19/2015     Lab Results   Component Value Date    PTT 26 02/16/2015    INR 1.18 (H) 02/16/2015     No results found for: LEANNA  No components found for: RHEUMATOIDFACTOR,  RF  Lab Results   Component Value Date    CRP <3.0 08/20/2008     No components found for: CKTOT, URICACID  No components found for: C3, C4, DSDNAAB, NDNAABIFA  No results found for: MPOAB    Patient reported Quality of Life (QOL) Measures   Patient Supplied Answers To VHI Questionnaire  Voice Handicap Index (VHI-10) 3/23/2022   My voice makes it difficult for people to hear me 0   People have difficulty understanding me in a noisy room 0   My voice difficulties restrict my personal and social life.  0   I feel left out of conversations because of my voice 0   My voice problem causes me to lose income 0   I feel as though I have to strain to produce voice 0   The clarity of my voice is unpredictable 0   My voice problem upsets me 0   My voice makes me feel handicapped 0   People ask, \"What's wrong with your voice?\" 0   VHI-10 0     Patient Supplied Answers To EAT Questionnaire  Eating Assessment Tool (EAT-10) 3/23/2022   My swallowing problem has caused me to lose weight 0   My swallowing problem interferes with my ability to go out for meals 0   Swallowing liquids takes extra effort 0   Swallowing solids " takes extra effort 0   Swallowing pills takes extra effort 0   Swallowing is painful 0   The pleasure of eating is affected by my swallowing 0   When I swallow food sticks in my throat 1   I cough when I eat 0   EAT-10 1     Patient Supplied Answers To CSI Questionnaire  Cough Severity Index (CSI) 3/23/2022   My cough is worse when I lie down 0   My coughing problem causes me to restrict my personal and social life 0   I tend to avoid places because of my cough problem 0   I feel embarrassed because of my coughing problem 0   People ask, ''What's wrong?'' because I cough a lot 0   I run out of air when I cough 0   My coughing problem affects my voice 0   My coughing problem limits my physical activity 0   My coughing problem upsets me 0   People ask me if I am sick because I cough a lot 0   CSI Score 0     Patient Supplied Answers to Dyspnea Index Questionnaire:  No flowsheet data found.    Impression & Plan     IMPRESSION: Ms. Gregg is a 58 year old female who is being seen for the followin. Globus sensation  - sudden onset of a sore throat a few weeks ago for one day  - has felt sensation of something in her throat and voice changes constantly since  - is more pronounced after eating or in the evenings when she lays down  - does not have difficulty swallowing or food getting stuck  - worse with use and gets more raspy  - notices more vocal fatigue  - scope 3/29/22 shows supraglottic hyperfunction, no pooling of saliva, postcricoid edema, right and left arytenoids at times during adduction appear asymmetric, however, abduction is normal and well coordinated  - discussed doing an Xray Video Swallow Exam with esophagram to evaluate GI etiology  - discussed symptoms likely due to laryngopharyngeal reflux and muscle tension dysphonia  - discussed treatment is voice therapy  - discussed screening with Xray Video Swallow Exam and esophagram with reflux precautions  - if not improved after therapy, we will refer  to Dr. Rust for reflux management  Plan  - reflux precautions  - Xray Video Swallow Exam with esophagram   - follow up as needed after therapy  - referral to Dr. Rust after therapy if symptoms do not improve      RETURN VISIT: after therapy      Scribe Disclosure:  I, Lesleylevi Villanueva, am serving as a scribe to document services personally performed by Daniela Rueda MD at this visit, based upon the provider's statements to me. All documentation has been reviewed by the aforementioned provider prior to being entered into the official medical record.       Thank you for the kind referral and for allowing me to share in the care of Ms. Gregg. If you have any questions, please do not hesitate to contact me.    Daniela Rueda MD    Laryngology    [facility]  Department of  Otolaryngology - Head and Neck Surgery  [facility] [place] [address]  Appointment line: [phone]  Fax: [phone]  [email or web address]          Normal for race

## 2022-06-17 ENCOUNTER — APPOINTMENT (OUTPATIENT)
Dept: INTERNAL MEDICINE | Facility: CLINIC | Age: 32
End: 2022-06-17

## 2022-12-01 NOTE — ED PROVIDER NOTE - NSCAREINITIATED _GEN_ER
Patient Returning Call    Reason for call:  Anticoagulation    Information relayed to patient:  N/A    Patient has additional questions:  Yes    What are your questions/concerns:  Pt needs to discuss INR results/dosing and appt scheduling time    [facility] or would you prefer to receive a phone call?:   Patient would prefer a phone call   Okay to leave a detailed message?: Yes at Home number on file [phone] (home)     Kp Perez(Attending)

## 2023-04-20 ENCOUNTER — EMERGENCY (EMERGENCY)
Facility: HOSPITAL | Age: 33
LOS: 1 days | Discharge: HOME | End: 2023-04-20
Attending: EMERGENCY MEDICINE
Payer: SELF-PAY

## 2023-04-20 VITALS
HEIGHT: 63 IN | OXYGEN SATURATION: 99 % | DIASTOLIC BLOOD PRESSURE: 70 MMHG | SYSTOLIC BLOOD PRESSURE: 102 MMHG | RESPIRATION RATE: 18 BRPM | TEMPERATURE: 98 F | WEIGHT: 147.05 LBS | HEART RATE: 68 BPM

## 2023-04-20 DIAGNOSIS — O00.90 UNSPECIFIED ECTOPIC PREGNANCY WITHOUT INTRAUTERINE PREGNANCY: Chronic | ICD-10-CM

## 2023-04-20 PROCEDURE — 99285 EMERGENCY DEPT VISIT HI MDM: CPT

## 2023-04-20 NOTE — ED ADULT TRIAGE NOTE - GLASGOW COMA SCALE: EYE OPENING, MLM
July 7, 2022    Cardiology Provider You Saw During Your Visit: Dr. Fox      Medication Changes: None      Follow Up: In 1 year with fasting lab work and an echocardiogram        Follow the American Heart Association Diet and Lifestyle Recommendations:  -Limit saturated fat, trans fat, sodium, red meat, sweets and sugar-sweetened beverages. If you choose to eat red meat, compare labels and select the leanest cuts available.  -Aim for at least 150 minutes of moderate physical activity or 75 minutes of vigorous physical activity - or an equal combination of both - each week.      To Reach Us:  -During business hours: [phone], press option # 1 to schedule an appointment or to leave a message for your care team.     -After hours, weekends or holidays: [phone], press option #4 and ask to speak to the on-call cardiologist. Inform them you are a patient at the [facility].      Jana Solis RN  Cardiology Care Coordinator - General Cardiology  [facility]     (E4) spontaneous

## 2023-04-21 VITALS
SYSTOLIC BLOOD PRESSURE: 95 MMHG | TEMPERATURE: 98 F | RESPIRATION RATE: 16 BRPM | HEART RATE: 68 BPM | OXYGEN SATURATION: 100 % | DIASTOLIC BLOOD PRESSURE: 56 MMHG

## 2023-04-21 LAB
ALBUMIN SERPL ELPH-MCNC: 4.3 G/DL — SIGNIFICANT CHANGE UP (ref 3.3–5)
ALP SERPL-CCNC: 52 U/L — SIGNIFICANT CHANGE UP (ref 40–120)
ALT FLD-CCNC: 18 U/L — SIGNIFICANT CHANGE UP (ref 10–45)
ANION GAP SERPL CALC-SCNC: 8 MMOL/L — SIGNIFICANT CHANGE UP (ref 5–17)
APPEARANCE UR: ABNORMAL
AST SERPL-CCNC: 24 U/L — SIGNIFICANT CHANGE UP (ref 10–40)
BACTERIA # UR AUTO: ABNORMAL
BASOPHILS # BLD AUTO: 0.02 K/UL — SIGNIFICANT CHANGE UP (ref 0–0.2)
BASOPHILS NFR BLD AUTO: 0.4 % — SIGNIFICANT CHANGE UP (ref 0–2)
BILIRUB SERPL-MCNC: 0.2 MG/DL — SIGNIFICANT CHANGE UP (ref 0.2–1.2)
BILIRUB UR-MCNC: NEGATIVE — SIGNIFICANT CHANGE UP
BLD GP AB SCN SERPL QL: NEGATIVE — SIGNIFICANT CHANGE UP
BUN SERPL-MCNC: 13 MG/DL — SIGNIFICANT CHANGE UP (ref 7–23)
CALCIUM SERPL-MCNC: 8.6 MG/DL — SIGNIFICANT CHANGE UP (ref 8.4–10.5)
CHLORIDE SERPL-SCNC: 107 MMOL/L — SIGNIFICANT CHANGE UP (ref 96–108)
CO2 SERPL-SCNC: 24 MMOL/L — SIGNIFICANT CHANGE UP (ref 22–31)
COLOR SPEC: YELLOW — SIGNIFICANT CHANGE UP
CREAT SERPL-MCNC: 0.66 MG/DL — SIGNIFICANT CHANGE UP (ref 0.5–1.3)
DIFF PNL FLD: NEGATIVE — SIGNIFICANT CHANGE UP
EGFR: 121 ML/MIN/1.73M2 — SIGNIFICANT CHANGE UP
EOSINOPHIL # BLD AUTO: 0.05 K/UL — SIGNIFICANT CHANGE UP (ref 0–0.5)
EOSINOPHIL NFR BLD AUTO: 0.9 % — SIGNIFICANT CHANGE UP (ref 0–6)
EPI CELLS # UR: 4 /HPF — SIGNIFICANT CHANGE UP
GLUCOSE SERPL-MCNC: 95 MG/DL — SIGNIFICANT CHANGE UP (ref 70–99)
GLUCOSE UR QL: NEGATIVE — SIGNIFICANT CHANGE UP
HCG SERPL-ACNC: 28.1 MIU/ML — HIGH
HCT VFR BLD CALC: 36.1 % — SIGNIFICANT CHANGE UP (ref 34.5–45)
HGB BLD-MCNC: 11.8 G/DL — SIGNIFICANT CHANGE UP (ref 11.5–15.5)
HYALINE CASTS # UR AUTO: 13 /LPF — HIGH (ref 0–2)
IMM GRANULOCYTES NFR BLD AUTO: 0.2 % — SIGNIFICANT CHANGE UP (ref 0–0.9)
KETONES UR-MCNC: NEGATIVE — SIGNIFICANT CHANGE UP
LEUKOCYTE ESTERASE UR-ACNC: ABNORMAL
LYMPHOCYTES # BLD AUTO: 2.55 K/UL — SIGNIFICANT CHANGE UP (ref 1–3.3)
LYMPHOCYTES # BLD AUTO: 45.4 % — HIGH (ref 13–44)
MCHC RBC-ENTMCNC: 30.6 PG — SIGNIFICANT CHANGE UP (ref 27–34)
MCHC RBC-ENTMCNC: 32.7 GM/DL — SIGNIFICANT CHANGE UP (ref 32–36)
MCV RBC AUTO: 93.5 FL — SIGNIFICANT CHANGE UP (ref 80–100)
MONOCYTES # BLD AUTO: 0.31 K/UL — SIGNIFICANT CHANGE UP (ref 0–0.9)
MONOCYTES NFR BLD AUTO: 5.5 % — SIGNIFICANT CHANGE UP (ref 2–14)
NEUTROPHILS # BLD AUTO: 2.68 K/UL — SIGNIFICANT CHANGE UP (ref 1.8–7.4)
NEUTROPHILS NFR BLD AUTO: 47.6 % — SIGNIFICANT CHANGE UP (ref 43–77)
NITRITE UR-MCNC: NEGATIVE — SIGNIFICANT CHANGE UP
NRBC # BLD: 0 /100 WBCS — SIGNIFICANT CHANGE UP (ref 0–0)
PH UR: 8 — SIGNIFICANT CHANGE UP (ref 5–8)
PLATELET # BLD AUTO: 195 K/UL — SIGNIFICANT CHANGE UP (ref 150–400)
POTASSIUM SERPL-MCNC: 3.7 MMOL/L — SIGNIFICANT CHANGE UP (ref 3.5–5.3)
POTASSIUM SERPL-SCNC: 3.7 MMOL/L — SIGNIFICANT CHANGE UP (ref 3.5–5.3)
PROT SERPL-MCNC: 6.8 G/DL — SIGNIFICANT CHANGE UP (ref 6–8.3)
PROT UR-MCNC: ABNORMAL
RBC # BLD: 3.86 M/UL — SIGNIFICANT CHANGE UP (ref 3.8–5.2)
RBC # FLD: 13.2 % — SIGNIFICANT CHANGE UP (ref 10.3–14.5)
RBC CASTS # UR COMP ASSIST: 3 /HPF — SIGNIFICANT CHANGE UP (ref 0–4)
RH IG SCN BLD-IMP: POSITIVE — SIGNIFICANT CHANGE UP
SODIUM SERPL-SCNC: 139 MMOL/L — SIGNIFICANT CHANGE UP (ref 135–145)
SP GR SPEC: 1.03 — HIGH (ref 1.01–1.02)
UROBILINOGEN FLD QL: NEGATIVE — SIGNIFICANT CHANGE UP
WBC # BLD: 5.62 K/UL — SIGNIFICANT CHANGE UP (ref 3.8–10.5)
WBC # FLD AUTO: 5.62 K/UL — SIGNIFICANT CHANGE UP (ref 3.8–10.5)
WBC UR QL: 113 /HPF — HIGH (ref 0–5)

## 2023-04-21 PROCEDURE — 96375 TX/PRO/DX INJ NEW DRUG ADDON: CPT

## 2023-04-21 PROCEDURE — 86900 BLOOD TYPING SEROLOGIC ABO: CPT

## 2023-04-21 PROCEDURE — 76817 TRANSVAGINAL US OBSTETRIC: CPT | Mod: 26

## 2023-04-21 PROCEDURE — 96374 THER/PROPH/DIAG INJ IV PUSH: CPT

## 2023-04-21 PROCEDURE — 81001 URINALYSIS AUTO W/SCOPE: CPT

## 2023-04-21 PROCEDURE — 80053 COMPREHEN METABOLIC PANEL: CPT

## 2023-04-21 PROCEDURE — 76801 OB US < 14 WKS SINGLE FETUS: CPT | Mod: 26

## 2023-04-21 PROCEDURE — 86850 RBC ANTIBODY SCREEN: CPT

## 2023-04-21 PROCEDURE — 86901 BLOOD TYPING SEROLOGIC RH(D): CPT

## 2023-04-21 PROCEDURE — 85025 COMPLETE CBC W/AUTO DIFF WBC: CPT

## 2023-04-21 PROCEDURE — 36415 COLL VENOUS BLD VENIPUNCTURE: CPT

## 2023-04-21 PROCEDURE — 93975 VASCULAR STUDY: CPT | Mod: 26

## 2023-04-21 PROCEDURE — 76801 OB US < 14 WKS SINGLE FETUS: CPT

## 2023-04-21 PROCEDURE — 84702 CHORIONIC GONADOTROPIN TEST: CPT

## 2023-04-21 PROCEDURE — 99285 EMERGENCY DEPT VISIT HI MDM: CPT | Mod: 25

## 2023-04-21 PROCEDURE — 93975 VASCULAR STUDY: CPT

## 2023-04-21 PROCEDURE — 76817 TRANSVAGINAL US OBSTETRIC: CPT

## 2023-04-21 RX ORDER — ONDANSETRON 8 MG/1
4 TABLET, FILM COATED ORAL ONCE
Refills: 0 | Status: COMPLETED | OUTPATIENT
Start: 2023-04-21 | End: 2023-04-21

## 2023-04-21 RX ORDER — KETOROLAC TROMETHAMINE 30 MG/ML
15 SYRINGE (ML) INJECTION ONCE
Refills: 0 | Status: DISCONTINUED | OUTPATIENT
Start: 2023-04-21 | End: 2023-04-21

## 2023-04-21 RX ADMIN — Medication 15 MILLIGRAM(S): at 00:53

## 2023-04-21 RX ADMIN — ONDANSETRON 4 MILLIGRAM(S): 8 TABLET, FILM COATED ORAL at 00:53

## 2023-04-21 NOTE — ED ADULT NURSE NOTE - OBJECTIVE STATEMENT
Pt is a 33 y/o female presenting to the ED c/o vaginal spotting. Pt endorses 1 day of light vaginal spotting with headache, suprapubic pain, n/v, and dizziness. PMH ectopic pregnancy. Pt is A&Ox3, following commands, speaking coherently, sensory/motor function intact, VSS, NAD noted. Pt states taking tylenol prior to arrival, rates current pain 9/10. Pt denies fever, chills, weakness, chest pain, SOB, changes in bowel/bladder function, changes in vision, numbness/tingling at this time. Pt is a 33 y/o female presenting to the ED c/o vaginal spotting. Pt endorses 1 day of light vaginal spotting with headache, suprapubic pain, n/v, and dizziness. PMH ectopic pregnancy. LMP 3/27/23. Pt is A&Ox3, following commands, speaking coherently, sensory/motor function intact, VSS, NAD noted. Pt states taking tylenol prior to arrival, rates current pain 9/10. Pt denies fever, chills, weakness, chest pain, SOB, changes in bowel/bladder function, changes in vision, numbness/tingling at this time.

## 2023-04-21 NOTE — CONSULT NOTE ADULT - ASSESSMENT
31yo  at LMP 3/27 GA 3w4d presents for r/o ectopic, TVUS w/out IUP, w/out adnexal masses, trace fluid. bHCG 28, VSS, patient doing well.     - Differential include early pregnancy, failed pregnancy and cannot r/o ectopic pregnancy due to bHCG below discriminatory zone for IUP.   - Return to ED for repeat bHCG in 48hours, will require serial bHCG until IUP in confirmed.  - Reviewed emergency return precautions for ectopic pregnancy with excellent teach back.  - Contact GYN for questions or concerns - Spectra [phone] or Pager [phone].  - No further GYN intervention at this time.    Amyeo Afroz Jereen, PGY-2  d/w Dr Rodríguez   31yo  at LMP 3/27 GA 3w4d presents for r/o ectopic, TVUS w/out IUP, w/out adnexal masses, trace fluid. bHCG 28, VSS, patient doing well.     - Differential include early pregnancy, failed pregnancy and cannot r/o ectopic pregnancy due to bHCG below discriminatory zone for IUP.   - Patient to follow with [facility] for serial bHCG - [address], [place] [address] [phone]  - Return to ED for repeat bHCG in 48hours, will require serial bHCG until IUP in confirmed.  - Reviewed emergency return precautions for ectopic pregnancy with excellent teach back.  - Contact GYN for questions or concerns - Spectra [phone] or Pager [phone].  - No further GYN intervention at this time.    Amyeo Afroz Jereen, PGY-2  d/w Dr Rodríguez

## 2023-04-21 NOTE — CONSULT NOTE ADULT - SUBJECTIVE AND OBJECTIVE BOX
ANDI MANTILLA  32y  Female [id number]    HPI: 31yo  at LMP 3/27 GA 3w4d h/o left ectopic pregnancy s/p lsc left salpingectomy, presents due to abdominal cramping i/s/o positive UPT. Patient reports she presented for full evaluation due to her history and her wish to preserve her right fallopian tubes. Patient has been attempting to become pregnancy w/out success for three years, and was surprised because current pregnancy came out of the blue and made the patient nervous. Patient reports having taken a Tylenol at home, now w/ complete resolution of her pain. Patient denies chest pain, shortness of breath, fevers, chills, nausea, vomiting, diarrhea.    OBHx: 2012 FT , 2017 ectopic pregnancy s/p left salpingectomy  GYNHx: denies fibroids, cysts, abnormal paps, STDs  PMH: denies  PSH: lsc left salpingectomy  Meds: PNVs  All: NKDA  Soc: no substance use, anxiety/depression    accepts blood      Vital Signs Last 24 Hrs  T(C): 36.7 (2023 00:15), Max: 36.8 (2023 20:14)  T(F): 98 (2023 00:15), Max: 98.2 (2023 20:14)  HR: 56 (2023 00:15) (56 - 68)  BP: 101/65 (2023 00:15) (101/65 - 102/70)  BP(mean): 71 (2023 00:15) (71 - 71)  RR: 16 (2023 00:15) (16 - 18)  SpO2: 97% (2023 00:15) (97% - 99%)    Parameters below as of 2023 00:15  Patient On (Oxygen Delivery Method): room air        Physical Exam:   General: sitting comfortably in bed, NAD   HEENT: neck supple, full ROM  Abd: Soft, non-tender, non-distended.  Bowel sounds present.    :  No bleeding on pad.   Ext: non-tender b/l, no edema     LABS:                              11.8   5.62  )-----------( 195      ( 2023 01:05 )             36.1     04-    139  |  107  |  13  ----------------------------<  95  3.7   |  24  |  0.66    Ca    8.6      2023 01:05    TPro  6.8  /  Alb  4.3  /  TBili  0.2  /  DBili  x   /  AST  24  /  ALT  18  /  AlkPhos  52      I&O's Detail          RADIOLOGY & ADDITIONAL STUDIES:  < from: US Transvaginal, OB (23 @ 02:23) >  LMP: 3/27/2023    Estimated Gestational Age by LMP: 3 weeks 4 days    COMPARISON: None available.    Endovaginal and transabdominal pelvic sonogram. Color and Spectral   Doppler was performed.    FINDINGS:  Uterus: 9.3 x 4.6 x 5.4 cm    No gestational sac, fetal pole or yolk sac identified.    Right ovary: 3.7 x 2.8 x 2.7 cm. A 1.5 cm corpus luteal cyst is noted.   Normal arterial and venous waveforms.  Left ovary: 2.5 x 1.9 x 1.7 cm. Within normal limits. Normal arterial and   venous waveforms.    Fluid: Trace fluid in the pelvic cul-de-sac    IMPRESSION:    No intrauterine gestational sac identified. In the setting of a positive   serum beta hCG, the findings are suggestive of an early intrauterine   pregnancy, failed pregnancy or ectopic. Clinical correlation necessary    --- End of Report ---             PARRIS MAJANO MD; Attending Radiologist  This document has been electronically signed. 2023  3:20AM    < end of copied text >   ANDI MANTILLA  32y  Female [id number]    HPI: 31yo  at LMP 3/27 GA 3w4d h/o left ectopic pregnancy s/p lsc left salpingectomy, presents due to abdominal cramping i/s/o positive UPT. Patient reports she presented for full evaluation due to her history and her wish to preserve her right fallopian tubes. Patient has been attempting to become pregnancy w/out success for three years, and was surprised because current pregnancy "came out of the blue" and made the patient nervous. Patient reports having taken a Tylenol at home, now w/ complete resolution of her pain. Patient denies chest pain, shortness of breath, fevers, chills, nausea, vomiting, diarrhea.    OBHx: 2012 FT , 2017 ectopic pregnancy s/p left salpingectomy  GYNHx: denies fibroids, cysts, abnormal paps, STDs  PMH: denies  PSH: lsc left salpingectomy  Meds: PNVs  All: NKDA  Soc: no substance use, anxiety/depression    accepts blood      Vital Signs Last 24 Hrs  T(C): 36.7 (2023 00:15), Max: 36.8 (2023 20:14)  T(F): 98 (2023 00:15), Max: 98.2 (2023 20:14)  HR: 56 (2023 00:15) (56 - 68)  BP: 101/65 (2023 00:15) (101/65 - 102/70)  BP(mean): 71 (2023 00:15) (71 - 71)  RR: 16 (2023 00:15) (16 - 18)  SpO2: 97% (2023 00:15) (97% - 99%)    Parameters below as of 2023 00:15  Patient On (Oxygen Delivery Method): room air        Physical Exam:   General: sitting comfortably in bed, NAD   HEENT: neck supple, full ROM  Abd: Soft, non-tender, non-distended.  Bowel sounds present.    :  No bleeding on pad.   Ext: non-tender b/l, no edema     LABS:                              11.8   5.62  )-----------( 195      ( 2023 01:05 )             36.1     04-    139  |  107  |  13  ----------------------------<  95  3.7   |  24  |  0.66    Ca    8.6      2023 01:05    TPro  6.8  /  Alb  4.3  /  TBili  0.2  /  DBili  x   /  AST  24  /  ALT  18  /  AlkPhos  52      I&O's Detail          RADIOLOGY & ADDITIONAL STUDIES:  < from: US Transvaginal, OB (23 @ 02:23) >  LMP: 3/27/2023    Estimated Gestational Age by LMP: 3 weeks 4 days    COMPARISON: None available.    Endovaginal and transabdominal pelvic sonogram. Color and Spectral   Doppler was performed.    FINDINGS:  Uterus: 9.3 x 4.6 x 5.4 cm    No gestational sac, fetal pole or yolk sac identified.    Right ovary: 3.7 x 2.8 x 2.7 cm. A 1.5 cm corpus luteal cyst is noted.   Normal arterial and venous waveforms.  Left ovary: 2.5 x 1.9 x 1.7 cm. Within normal limits. Normal arterial and   venous waveforms.    Fluid: Trace fluid in the pelvic cul-de-sac    IMPRESSION:    No intrauterine gestational sac identified. In the setting of a positive   serum beta hCG, the findings are suggestive of an early intrauterine   pregnancy, failed pregnancy or ectopic. Clinical correlation necessary    --- End of Report ---             PARRIS MAJANO MD; Attending Radiologist  This document has been electronically signed. 2023  3:20AM    < end of copied text >

## 2023-04-21 NOTE — ED PROVIDER NOTE - CLINICAL SUMMARY MEDICAL DECISION MAKING FREE TEXT BOX
32-year-old female with history of salpingectomy for ectopic presenting with pelvic pain concerning for ectopic versus early pregnancy versus infectious process plan for ultrasound Labs pain control UA  Patient has a beta-hCG of 28 has a blood type of O+ and on ultrasound has no definitive IUP vaginal spotting has significantly subsided discussed with the patient and after consultation with OB decided to discharge with every 48 hours repeat labs and ultrasound she has her own OB and understands that she can come back here

## 2023-04-21 NOTE — ED PROVIDER NOTE - PATIENT PORTAL LINK FT
You can access the FollowMyHealth Patient Portal offered by [facility] by registering at the following website: [email or web address]. By joining [employer]’s FollowMyHealth portal, you will also be able to view your health information using other applications (apps) compatible with our system.

## 2023-04-21 NOTE — ED PROVIDER NOTE - PROGRESS NOTE DETAILS
OB GYN recommends  2 day repeat beta to evaluate pregnancy of unknown location    Anuradha Alvarenga MD, PGY2

## 2023-04-21 NOTE — ED PROVIDER NOTE - NSFOLLOWUPINSTRUCTIONS_ED_ALL_ED_FT
You were seen for abdominal pain, vaginal spotting. You are pregnant but the pregnancy could not be visualized. Please follow up in 2 days for a repeat beta level. You can come to the ER here or follow up with your original OB GYN. Return if you have worsening pain, bleeding, if you feel lightheaded or weak. Take Motrin 650 mg every 6 hours for pain.

## 2023-04-21 NOTE — ED PROVIDER NOTE - OBJECTIVE STATEMENT
33-year-old female with prior history of ectopic resulting in left salpingectomy presenting with lower abdominal pain and discomfort and vaginal spotting since yesterday she is sexually active but is not sure if she is pregnant however felt similar to her prior episode and decided to present she reports feeling intermittent pain that is sometimes sharp and crampy no fever chills no urinary symptoms no chest pain no shortness of breath she is not taking any anticoagulants or antiplatelets

## 2023-04-21 NOTE — CONSULT NOTE ADULT - TIME BILLING
/Attendiny/o  @3.4wks by LMP of 3/27/23, presented to the ED with complaints of cramping and nausea with a positive home pregnancy test.    Pt discussed with me; chart reviewed; I agree with the above Resident's assessment and plan.    Pt c/o nausea and cramping x2 days and took a pregnancy test yesterday, which was positive.  Pt was worried as she has a history of a left ectopic pregnancy and is s/p a LSC/ left salpingectomy.  Pt says cramping diffuse and is 2/10. Pt denies any emesis, bleeding, CP, or SOB.      Pt with hx of  in  and left ectopic w/ left salpingectomy in 2017.    VSS, afebrile  Exam per Resident; benign gyn exam; no bleeding, or tenderness elicited    H/h: 11.8/36.1; Plt: 195; HC.1    Sono: Uterus wnl; no IUP seen; nl ovaries jeanne w/ right 1.5cm ovarian corpus luteum    A/P  -33y/o  @3.4wks by LMP of 3/27/23 who presented with cramping and nausea and a hx of left ectopic pernancy s/p lsc/ left salpingectomy  -No IUP seen and is most likely an early IUP, but cannot r/o ectopic pregnancy as BCHG is below the discriminatory zone.  -Please obtain a T&S, although no bleeding.  -No acute Gyn intervention at this time; pt to f/u in 48hrs for repeat bhcg and sono.  -Ectopic precautions/ bleeding/ pain precautions given.  Thank you for the consult; please call if any further questions.    Dr. Thomson

## 2023-04-21 NOTE — ED PROVIDER NOTE - PHYSICAL EXAMINATION
Well-appearing no acute distress neuro grossly intact clear lungs S1-2 soft abdomen except mild suprapubic tenderness no flank tenderness

## 2023-04-23 ENCOUNTER — EMERGENCY (EMERGENCY)
Facility: HOSPITAL | Age: 33
LOS: 1 days | Discharge: HOME | End: 2023-04-23
Attending: EMERGENCY MEDICINE
Payer: OTHER GOVERNMENT

## 2023-04-23 VITALS
OXYGEN SATURATION: 98 % | DIASTOLIC BLOOD PRESSURE: 74 MMHG | TEMPERATURE: 99 F | HEIGHT: 63 IN | RESPIRATION RATE: 20 BRPM | SYSTOLIC BLOOD PRESSURE: 108 MMHG | WEIGHT: 145.95 LBS | HEART RATE: 68 BPM

## 2023-04-23 VITALS
HEART RATE: 77 BPM | SYSTOLIC BLOOD PRESSURE: 100 MMHG | OXYGEN SATURATION: 99 % | DIASTOLIC BLOOD PRESSURE: 61 MMHG | TEMPERATURE: 99 F | RESPIRATION RATE: 18 BRPM

## 2023-04-23 DIAGNOSIS — O00.90 UNSPECIFIED ECTOPIC PREGNANCY WITHOUT INTRAUTERINE PREGNANCY: Chronic | ICD-10-CM

## 2023-04-23 LAB
ALBUMIN SERPL ELPH-MCNC: 4.2 G/DL — SIGNIFICANT CHANGE UP (ref 3.3–5)
ALP SERPL-CCNC: 54 U/L — SIGNIFICANT CHANGE UP (ref 40–120)
ALT FLD-CCNC: 14 U/L — SIGNIFICANT CHANGE UP (ref 10–45)
ANION GAP SERPL CALC-SCNC: 10 MMOL/L — SIGNIFICANT CHANGE UP (ref 5–17)
AST SERPL-CCNC: 19 U/L — SIGNIFICANT CHANGE UP (ref 10–40)
BASOPHILS # BLD AUTO: 0.01 K/UL — SIGNIFICANT CHANGE UP (ref 0–0.2)
BASOPHILS NFR BLD AUTO: 0.1 % — SIGNIFICANT CHANGE UP (ref 0–2)
BILIRUB SERPL-MCNC: 0.2 MG/DL — SIGNIFICANT CHANGE UP (ref 0.2–1.2)
BUN SERPL-MCNC: 16 MG/DL — SIGNIFICANT CHANGE UP (ref 7–23)
CALCIUM SERPL-MCNC: 8.9 MG/DL — SIGNIFICANT CHANGE UP (ref 8.4–10.5)
CHLORIDE SERPL-SCNC: 108 MMOL/L — SIGNIFICANT CHANGE UP (ref 96–108)
CO2 SERPL-SCNC: 23 MMOL/L — SIGNIFICANT CHANGE UP (ref 22–31)
CREAT SERPL-MCNC: 0.75 MG/DL — SIGNIFICANT CHANGE UP (ref 0.5–1.3)
EGFR: 110 ML/MIN/1.73M2 — SIGNIFICANT CHANGE UP
EOSINOPHIL # BLD AUTO: 0.04 K/UL — SIGNIFICANT CHANGE UP (ref 0–0.5)
EOSINOPHIL NFR BLD AUTO: 0.6 % — SIGNIFICANT CHANGE UP (ref 0–6)
GLUCOSE SERPL-MCNC: 90 MG/DL — SIGNIFICANT CHANGE UP (ref 70–99)
HCG SERPL-ACNC: 156.5 MIU/ML — HIGH
HCT VFR BLD CALC: 36.8 % — SIGNIFICANT CHANGE UP (ref 34.5–45)
HGB BLD-MCNC: 12.3 G/DL — SIGNIFICANT CHANGE UP (ref 11.5–15.5)
IMM GRANULOCYTES NFR BLD AUTO: 0.1 % — SIGNIFICANT CHANGE UP (ref 0–0.9)
LYMPHOCYTES # BLD AUTO: 2.23 K/UL — SIGNIFICANT CHANGE UP (ref 1–3.3)
LYMPHOCYTES # BLD AUTO: 33.1 % — SIGNIFICANT CHANGE UP (ref 13–44)
MCHC RBC-ENTMCNC: 31.5 PG — SIGNIFICANT CHANGE UP (ref 27–34)
MCHC RBC-ENTMCNC: 33.4 GM/DL — SIGNIFICANT CHANGE UP (ref 32–36)
MCV RBC AUTO: 94.1 FL — SIGNIFICANT CHANGE UP (ref 80–100)
MONOCYTES # BLD AUTO: 0.32 K/UL — SIGNIFICANT CHANGE UP (ref 0–0.9)
MONOCYTES NFR BLD AUTO: 4.8 % — SIGNIFICANT CHANGE UP (ref 2–14)
NEUTROPHILS # BLD AUTO: 4.12 K/UL — SIGNIFICANT CHANGE UP (ref 1.8–7.4)
NEUTROPHILS NFR BLD AUTO: 61.3 % — SIGNIFICANT CHANGE UP (ref 43–77)
NRBC # BLD: 0 /100 WBCS — SIGNIFICANT CHANGE UP (ref 0–0)
PLATELET # BLD AUTO: 189 K/UL — SIGNIFICANT CHANGE UP (ref 150–400)
POTASSIUM SERPL-MCNC: 3.5 MMOL/L — SIGNIFICANT CHANGE UP (ref 3.5–5.3)
POTASSIUM SERPL-SCNC: 3.5 MMOL/L — SIGNIFICANT CHANGE UP (ref 3.5–5.3)
PROT SERPL-MCNC: 6.8 G/DL — SIGNIFICANT CHANGE UP (ref 6–8.3)
RBC # BLD: 3.91 M/UL — SIGNIFICANT CHANGE UP (ref 3.8–5.2)
RBC # FLD: 13.1 % — SIGNIFICANT CHANGE UP (ref 10.3–14.5)
SODIUM SERPL-SCNC: 141 MMOL/L — SIGNIFICANT CHANGE UP (ref 135–145)
WBC # BLD: 6.73 K/UL — SIGNIFICANT CHANGE UP (ref 3.8–10.5)
WBC # FLD AUTO: 6.73 K/UL — SIGNIFICANT CHANGE UP (ref 3.8–10.5)

## 2023-04-23 PROCEDURE — 36415 COLL VENOUS BLD VENIPUNCTURE: CPT

## 2023-04-23 PROCEDURE — L9981: CPT

## 2023-04-23 PROCEDURE — 99284 EMERGENCY DEPT VISIT MOD MDM: CPT

## 2023-04-23 PROCEDURE — 85025 COMPLETE CBC W/AUTO DIFF WBC: CPT

## 2023-04-23 PROCEDURE — 80053 COMPREHEN METABOLIC PANEL: CPT

## 2023-04-23 PROCEDURE — 84702 CHORIONIC GONADOTROPIN TEST: CPT

## 2023-04-23 NOTE — CONSULT NOTE ADULT - TIME BILLING
/Attendiny/o  @3.6wks by LMP of 3/27/23 and currently with a pregnancy of unknown location w/ hx of left ectopic pregnancy s/p left salpingectomy, who presents to the ED for f/u BHCG and sono.  Pt with discussed with me; chart reviewed and I agree with the above Resident's assessment and plan.    Pt was last seen on  due to camping and pt worried due to previous history and +home pregnancy test. No IUP was seen and HCG was 28.1  Pt admits to some cramping, but denies any other complaints.    VSS, afebrile  Exam, as per Resident: benign gyn exam    H/H: 12.3/36.8; plt: 189; HC; O+    Sono: IMPRESSION:  No intrauterine gestational sac identified. In the setting of a positive serum beta hCG, the findings are suggestive of an early intrauterine pregnancy, failed pregnancy or ectopic. Clinical correlation necessary    A/P  -31y/o  @3.6wks by LMP of 3/27/23 w/ PUL and hx of left ectopic pregnancy, s/p left salpingectomy.  -BHCG increased appropriately, although no IUP seen as yet.  Most likely an early IUP, but cannot r/o ectopic pregnancy due to HCG below discriminatory zone.  -Pt encouraged to f/u in outpatient clinic for repeat BHCG in 48hrs and will continue to monitor until IUP seen.  -Ectopic precautions given and pt is stable for D/C home.  Thank you for the consult; please call if any further questions.    Dr. Thomson

## 2023-04-23 NOTE — ED PROVIDER NOTE - NSFOLLOWUPINSTRUCTIONS_ED_ALL_ED_FT
Please go to [facility] for serial CG, you can  [address], [place], [place] [id number] [phone] Please go to [facility] you can  [address], [place] [address] [phone]  - follow up in clinic for blood work 4/25, 4/27, 4/29, script sent.    Please return for sudden onset severe pain, dizziness, lightheadedness, passing out, heavy vaginal bleeding

## 2023-04-23 NOTE — ED PROVIDER NOTE - CLINICAL SUMMARY MEDICAL DECISION MAKING FREE TEXT BOX
32-year-old female history of left salpingectomy secondary to ruptured ectopic pregnancy presenting for beta-hCG check.  Patient was evaluated in the emergency department 2 days ago for lower abdominal pain and vaginal spotting, found to have elevated beta hCG of 28.1 with pregnancy of unknown known location.  Patient reports improvement in her pain at this time, states vaginal spotting has resolved since her last ED visit, reports some nausea no vomiting, fevers, chest pain, shortness of breath.    Patient is afebrile, hemodynamically stable with some right lower quadrant tenderness on exam.  We will repeat CBC, CMP and get a beta hCG, will consult OB/GYN as needed.  Patient counseled appropriately on plan and options for intervention if hCG is found to be elevated.  Low suspicion for ruptured ectopic pregnancy given stable vitals, lack of vaginal bleeding, and improvement in pain since her last ED evaluation. 32-year-old female history of left salpingectomy secondary to ruptured ectopic pregnancy presenting for beta-hCG check.  Patient was evaluated in the emergency department 2 days ago for lower abdominal pain and vaginal spotting, found to have elevated beta hCG of 28.1 with pregnancy of unknown known location.  Patient reports improvement in her pain at this time, states vaginal spotting has resolved since her last ED visit, reports some nausea no vomiting, fevers, chest pain, shortness of breath.    Patient is afebrile, hemodynamically stable with some right lower quadrant tenderness on exam.  We will repeat CBC, CMP and get a beta hCG, will consult OB/GYN as needed.  Patient counseled appropriately on plan and options for intervention if hCG is found to be elevated.  Low suspicion for ruptured ectopic pregnancy given stable vitals, lack of vaginal bleeding, and improvement in pain since her last ED evaluation. ZR

## 2023-04-23 NOTE — ED PROVIDER NOTE - OBJECTIVE STATEMENT
32-year-old female history of left salpingectomy secondary to ruptured ectopic pregnancy presenting for beta-hCG check.  Patient was evaluated in the emergency department 2 days ago for lower abdominal pain and vaginal spotting, found to have elevated beta hCG of 28.1 with pregnancy of unknown known location.  Patient reports improvement in her pain at this time, states vaginal spotting has resolved since her last ED visit, reports some nausea no vomiting, fevers, chest pain, shortness of breath.

## 2023-04-23 NOTE — ED ADULT NURSE NOTE - OBJECTIVE STATEMENT
patient is a 33 y/o F with hx of salpingectomy d/t ruptured ectopic pregnancy who presents to the ED for HCG check. patient was seen and d/c from [facility] ED 2 days ago for vaginal spotting and suprapubic pain, and was found to have a "pregnancy in unconfirmed location". patient directed to return to recheck HCG. patient states that her pain and spotting have improved from 2 days ago. patient resting in NAD. a&ox4, PERRL. ambulatory independently. respirations even and unlabored. abd soft, nondistended. IV placed. MD Woo at bedside to assess

## 2023-04-23 NOTE — ED ADULT TRIAGE NOTE - CHIEF COMPLAINT QUOTE
sent to r/o ectopic pregnancy, and follow up with HCG testing. Pt endorsing mild pelvic pain. Denies vaginal bleeding.

## 2023-04-23 NOTE — CONSULT NOTE ADULT - SUBJECTIVE AND OBJECTIVE BOX
SYLVIE MANTILLA  32y  Female [id number]    HPI:    33yo  at LMP 3/27 GA 3w6d h/o left ectopic pregnancy s/p lsc left salpingectomy, presents for repeat CG, is being followed for PUL.   Low grade 2/10 abdominal period like cramping, denies severe pain. Patient denies chest pain, shortness of breath, fevers, chills, nausea, vomiting, diarrhea.      bHCG   28   ->   156    OBHx: 2012 FT , 2017 ectopic pregnancy s/p left salpingectomy  GYNHx: denies fibroids, cysts, abnormal paps, STDs  PMH: denies  PSH: lsc left salpingectomy  Meds: PNVs  All: NKDA  Soc: no substance use, anxiety/depression      Vital Signs Last 24 Hrs  T(C): 37.1 (2023 19:49), Max: 37.1 (2023 19:49)  T(F): 98.8 (2023 19:49), Max: 98.8 (2023 19:49)  HR: 70 (2023 21:22) (68 - 70)  BP: 111/86 (2023 21:22) (108/74 - 111/86)  BP(mean): --  RR: 16 (2023 21:22) (16 - 20)  SpO2: 99% (2023 21:22) (98% - 99%)    Parameters below as of 2023 21:22  Patient On (Oxygen Delivery Method): room air        Physical Exam:   General: sitting comfortably in bed, NAD   Lungs: CTA b/l, good air flow b/l   Back: No CVA tenderness  Abd: Soft, non-tender, non-distended.  Bowel sounds present.    :  No bleeding on pad.    External labia wnl.  Bimanual exam with no cervical motion tenderness, uterus wnl, adnexa non palpable b/l.  Cervix closed vs. Cervix dilated    cm   Speculum Exam: No active bleeding from os.  Physiologic discharge.    Ext: non-tender b/l, no edema     LABS:                        12.3   6.73  )-----------( 189      ( 2023 21:16 )             36.8         141  |  108  |  16  ----------------------------<  90  3.5   |  23  |  0.75    Ca    8.9      2023 21:16    TPro  6.8  /  Alb  4.2  /  TBili  0.2  /  DBili  x   /  AST  19  /  ALT  14  /  AlkPhos  54      I&O's Detail      RADIOLOGY & ADDITIONAL STUDIES:  < from: US Transvaginal, OB (23 @ 02:23) >    LMP: 3/27/2023    Estimated Gestational Age by LMP: 3 weeks 4 days    COMPARISON: None available.    Endovaginal and transabdominal pelvic sonogram. Color and Spectral   Doppler was performed.    FINDINGS:  Uterus: 9.3 x 4.6 x 5.4 cm    No gestational sac, fetal pole or yolk sac identified.    Right ovary: 3.7 x 2.8 x 2.7 cm. A 1.5 cm corpus luteal cyst is noted.   Normal arterial and venous waveforms.  Left ovary: 2.5 x 1.9 x 1.7 cm. Within normal limits. Normal arterial and   venous waveforms.    Fluid: Trace fluid in the pelvic cul-de-sac    IMPRESSION:    No intrauterine gestational sac identified. In the setting of a positive   serum beta hCG, the findings are suggestive of an early intrauterine   pregnancy, failed pregnancy or ectopic. Clinical correlation necessary    --- End of Report ---        < end of copied text >   SYLVIE MANTILLA  32y  Female [id number]    HPI:    31yo  at LMP 3/27 GA 3w6d h/o left ectopic pregnancy s/p lsc left salpingectomy, presents for repeat [facility], is being followed for PUL.   Mild 2/10 abdominal period like cramping, denies vaginal bleeding, denies severe pain. Patient denies chest pain, shortness of breath, fevers, chills, nausea, vomiting, diarrhea.      [facility]   28   ->   156    OBHx: 2012 FT , 2017 ectopic pregnancy s/p left salpingectomy  GYNHx: denies fibroids, cysts, abnormal paps, STDs  PMH: denies  PSH: lsc left salpingectomy  Meds: PNVs  All: NKDA  Soc: no substance use, anxiety/depression      Vital Signs Last 24 Hrs  T(C): 37.1 (2023 19:49), Max: 37.1 (2023 19:49)  T(F): 98.8 (2023 19:49), Max: 98.8 (2023 19:49)  HR: 70 (2023 21:22) (68 - 70)  BP: 111/86 (2023 21:22) (108/74 - 111/86)  BP(mean): --  RR: 16 (2023 21:22) (16 - 20)  SpO2: 99% (2023 21:22) (98% - 99%)    Parameters below as of 2023 21:22  Patient On (Oxygen Delivery Method): room air        Physical Exam:   General: sitting comfortably in bed, NAD   Lungs: CTA b/l, good air flow b/l   Back: No CVA tenderness  Abd: Soft, non-tender, non-distended.  Bowel sounds present.    :  No bleeding on pad.    External labia wnl.  Bimanual exam with no cervical motion tenderness, uterus wnl, adnexa non palpable b/l.  Cervix closed vs. Cervix dilated    cm   Speculum Exam: No active bleeding from os.  Physiologic discharge.    Ext: non-tender b/l, no edema     LABS:                        12.3   6.73  )-----------( 189      ( 2023 21:16 )             36.8     04-23    141  |  108  |  16  ----------------------------<  90  3.5   |  23  |  0.75    Ca    8.9      2023 21:16    TPro  6.8  /  Alb  4.2  /  TBili  0.2  /  DBili  x   /  AST  19  /  ALT  14  /  AlkPhos  54      I&O's Detail      RADIOLOGY & ADDITIONAL STUDIES:  < from: US Transvaginal, OB (23 @ 02:23) >    LMP: 3/27/2023    Estimated Gestational Age by LMP: 3 weeks 4 days    COMPARISON: None available.    Endovaginal and transabdominal pelvic sonogram. Color and Spectral   Doppler was performed.    FINDINGS:  Uterus: 9.3 x 4.6 x 5.4 cm    No gestational sac, fetal pole or yolk sac identified.    Right ovary: 3.7 x 2.8 x 2.7 cm. A 1.5 cm corpus luteal cyst is noted.   Normal arterial and venous waveforms.  Left ovary: 2.5 x 1.9 x 1.7 cm. Within normal limits. Normal arterial and   venous waveforms.    Fluid: Trace fluid in the pelvic cul-de-sac    IMPRESSION:    No intrauterine gestational sac identified. In the setting of a positive   serum beta hCG, the findings are suggestive of an early intrauterine   pregnancy, failed pregnancy or ectopic. Clinical correlation necessary    --- End of Report ---        < end of copied text >

## 2023-04-23 NOTE — ED PROVIDER NOTE - CARE PLAN
1 Principal Discharge DX:	Abdominal pain   Principal Discharge DX:	Pregnancy of unknown anatomic location

## 2023-04-23 NOTE — CONSULT NOTE ADULT - ASSESSMENT
31yo  at LMP 3/27 GA 3w6d h/o left ectopic pregnancy s/p lsc left salpingectomy, presents for repeat bHCG, is being followed for PUL. TVUS  w/out IUP, w/out adnexal masses, trace fluid. bHCG 28 ->156, VSS, patient doing well.     - Differential include early pregnancy, failed pregnancy and cannot r/o ectopic pregnancy due to bHCG below discriminatory zone for IUP.   - Patient to follow with [facility] for serial bHCG - [address], [place] [address] [phone]  - Requires serial bHCG until IUP in confirmed - will follow up in clinic for blood work , , , script sent.  - Reviewed emergency return precautions for ectopic pregnancy with excellent teach back.  - Contact GYN for questions or concerns - Spectra [phone] or Pager [phone].  - No further GYN intervention at this time.    Amyeo Afroz Jereen, PGY-2  d/w Dr Rodríguez     31yo  at LMP 3/27 GA 3w6d h/o left ectopic pregnancy s/p lsc left salpingectomy, presents for repeat bHCG, is being followed for PUL. TVUS  w/out IUP, w/out adnexal masses, trace fluid. bHCG 28 ->156, blood type O pos, VSS, patient doing well.     - Differential include early pregnancy, failed pregnancy and cannot r/o ectopic pregnancy due to bHCG below discriminatory zone for IUP.   - Patient to follow with [facility] for serial bHCG - [address], [place] [address] [phone]  - Requires serial bHCG until IUP in confirmed - will follow up in clinic for blood work , , , script sent.  - Reviewed emergency return precautions for ectopic pregnancy with excellent teach back.  - Contact GYN for questions or concerns - Spectra [phone] or Pager [phone].  - No further GYN intervention at this time.    Amyeo Afroz Jereen, PGY-2  d/w Dr Rodríguez

## 2023-04-23 NOTE — ED PROVIDER NOTE - PHYSICAL EXAMINATION
GENERAL: well appearing in no acute distress, non-toxic appearing  HEAD: normocephalic, atraumatic  HEENT: normal conjunctiva, oral mucosa moist,  CARDIAC: regular rate and rhythm, no appreciable murmurs, 2+ pulses in UE/LE b/l  PULM: normal breath sounds, clear to ascultation bilaterally, no rales, rhonchi, wheezing  GI: abdomen nondistended, soft, RLQ ttp, no guarding, rebound tenderness  NEURO:  AAOx3  MSK: no peripheral edema, no calf tenderness b/l  SKIN: well-perfused, extremities warm, no visible rashes  PSYCH: appropriate mood and affect

## 2023-04-23 NOTE — ED PROVIDER NOTE - PROGRESS NOTE DETAILS
Tala Woo, PGY1, MD: HCG now 156 from 28 2 days ago, high suspicion for ectopic, mike gordon, Tala Woo, PGY1, MD: HCG now 156 from 28 2 days ago, high suspicion for ectopic, mike gordon, Case discussed ,will see pt in ER Neil Fagan, PGY-3- Patient evaluated by OB/GYN.  At this time cannot rule out ectopic pregnancy or intrauterine pregnancy.  Beta hCG below discriminatory zone for sonographic detection of pregnancy.  Will discharge patient home to follow-up in OB/GYN clinic.  OB/GYN provided patient with prescription for beta-hCG blood test outpatient.  Patient provided with strict precautions regarding ectopic pregnancy and reasons to return to the ED immediately. Discussed results of work up with patient. Patient agrees with plan to discharge home. All questions answered regarding plan. Strict return precautions given.

## 2023-04-25 DIAGNOSIS — O36.80X0 PREGNANCY WITH INCONCLUSIVE FETAL VIABILITY, NOT APPLICABLE OR UNSPECIFIED: ICD-10-CM

## 2023-04-25 NOTE — CHART NOTE - NSCHARTNOTEFT_GEN_A_CORE
Patient called to confirm that she will be following up either in clinic or ED for beta HCG. Patient did not answer and voice mailbox is not set up. Unable to leave voice message. Will attempt to reach patient once more today, and if no answer will try tomorrow.    Kei PGY1 Patient called to confirm that she will be following up either in clinic or ED for beta HCG. Patient did not answer and voice mailbox is not set up. Unable to leave voice message. Will attempt to reach patient once more today, and if no answer will try tomorrow.    Kei PGY1    *3pm update*    Patient called again and was reached. She states she presented to [address] [place] [address] office today for bHCG and was told she did not have an appt. Plan previously determined was for patient to get serial bHCGs at a [facility] lab on 4/25, 4/27, 4/29. Patient was instructed to come to the ED today due to missing window of opportunity to go to a [facility] lab. Pt states she can come to ED at 7pm jaye.     Kei PGY1

## 2023-04-26 ENCOUNTER — APPOINTMENT (OUTPATIENT)
Dept: INTERNAL MEDICINE | Facility: CLINIC | Age: 33
End: 2023-04-26

## 2023-04-27 ENCOUNTER — EMERGENCY (EMERGENCY)
Facility: HOSPITAL | Age: 33
LOS: 1 days | Discharge: HOME | End: 2023-04-27
Attending: EMERGENCY MEDICINE
Payer: SELF-PAY

## 2023-04-27 VITALS
OXYGEN SATURATION: 99 % | SYSTOLIC BLOOD PRESSURE: 104 MMHG | TEMPERATURE: 98 F | RESPIRATION RATE: 16 BRPM | HEIGHT: 63 IN | HEART RATE: 63 BPM | WEIGHT: 147.05 LBS | DIASTOLIC BLOOD PRESSURE: 68 MMHG

## 2023-04-27 DIAGNOSIS — O00.90 UNSPECIFIED ECTOPIC PREGNANCY WITHOUT INTRAUTERINE PREGNANCY: Chronic | ICD-10-CM

## 2023-04-27 LAB
ALBUMIN SERPL ELPH-MCNC: 4.3 G/DL — SIGNIFICANT CHANGE UP (ref 3.3–5)
ALP SERPL-CCNC: 58 U/L — SIGNIFICANT CHANGE UP (ref 40–120)
ALT FLD-CCNC: 13 U/L — SIGNIFICANT CHANGE UP (ref 10–45)
ANION GAP SERPL CALC-SCNC: 9 MMOL/L — SIGNIFICANT CHANGE UP (ref 5–17)
AST SERPL-CCNC: 15 U/L — SIGNIFICANT CHANGE UP (ref 10–40)
BASOPHILS # BLD AUTO: 0.02 K/UL — SIGNIFICANT CHANGE UP (ref 0–0.2)
BASOPHILS NFR BLD AUTO: 0.4 % — SIGNIFICANT CHANGE UP (ref 0–2)
BILIRUB SERPL-MCNC: 0.2 MG/DL — SIGNIFICANT CHANGE UP (ref 0.2–1.2)
BUN SERPL-MCNC: 16 MG/DL — SIGNIFICANT CHANGE UP (ref 7–23)
CALCIUM SERPL-MCNC: 8.5 MG/DL — SIGNIFICANT CHANGE UP (ref 8.4–10.5)
CHLORIDE SERPL-SCNC: 104 MMOL/L — SIGNIFICANT CHANGE UP (ref 96–108)
CO2 SERPL-SCNC: 25 MMOL/L — SIGNIFICANT CHANGE UP (ref 22–31)
CREAT SERPL-MCNC: 0.64 MG/DL — SIGNIFICANT CHANGE UP (ref 0.5–1.3)
EGFR: 122 ML/MIN/1.73M2 — SIGNIFICANT CHANGE UP
EOSINOPHIL # BLD AUTO: 0.05 K/UL — SIGNIFICANT CHANGE UP (ref 0–0.5)
EOSINOPHIL NFR BLD AUTO: 0.9 % — SIGNIFICANT CHANGE UP (ref 0–6)
GLUCOSE SERPL-MCNC: 94 MG/DL — SIGNIFICANT CHANGE UP (ref 70–99)
HCG SERPL-ACNC: 312.6 MIU/ML — HIGH
HCT VFR BLD CALC: 35.7 % — SIGNIFICANT CHANGE UP (ref 34.5–45)
HGB BLD-MCNC: 11.9 G/DL — SIGNIFICANT CHANGE UP (ref 11.5–15.5)
IMM GRANULOCYTES NFR BLD AUTO: 0.2 % — SIGNIFICANT CHANGE UP (ref 0–0.9)
LYMPHOCYTES # BLD AUTO: 2.39 K/UL — SIGNIFICANT CHANGE UP (ref 1–3.3)
LYMPHOCYTES # BLD AUTO: 42.9 % — SIGNIFICANT CHANGE UP (ref 13–44)
MCHC RBC-ENTMCNC: 31.6 PG — SIGNIFICANT CHANGE UP (ref 27–34)
MCHC RBC-ENTMCNC: 33.3 GM/DL — SIGNIFICANT CHANGE UP (ref 32–36)
MCV RBC AUTO: 94.7 FL — SIGNIFICANT CHANGE UP (ref 80–100)
MONOCYTES # BLD AUTO: 0.28 K/UL — SIGNIFICANT CHANGE UP (ref 0–0.9)
MONOCYTES NFR BLD AUTO: 5 % — SIGNIFICANT CHANGE UP (ref 2–14)
NEUTROPHILS # BLD AUTO: 2.82 K/UL — SIGNIFICANT CHANGE UP (ref 1.8–7.4)
NEUTROPHILS NFR BLD AUTO: 50.6 % — SIGNIFICANT CHANGE UP (ref 43–77)
NRBC # BLD: 0 /100 WBCS — SIGNIFICANT CHANGE UP (ref 0–0)
PLATELET # BLD AUTO: 183 K/UL — SIGNIFICANT CHANGE UP (ref 150–400)
POTASSIUM SERPL-MCNC: 3.6 MMOL/L — SIGNIFICANT CHANGE UP (ref 3.5–5.3)
POTASSIUM SERPL-SCNC: 3.6 MMOL/L — SIGNIFICANT CHANGE UP (ref 3.5–5.3)
PROT SERPL-MCNC: 7 G/DL — SIGNIFICANT CHANGE UP (ref 6–8.3)
RBC # BLD: 3.77 M/UL — LOW (ref 3.8–5.2)
RBC # FLD: 13.1 % — SIGNIFICANT CHANGE UP (ref 10.3–14.5)
SODIUM SERPL-SCNC: 138 MMOL/L — SIGNIFICANT CHANGE UP (ref 135–145)
WBC # BLD: 5.57 K/UL — SIGNIFICANT CHANGE UP (ref 3.8–10.5)
WBC # FLD AUTO: 5.57 K/UL — SIGNIFICANT CHANGE UP (ref 3.8–10.5)

## 2023-04-27 PROCEDURE — 99285 EMERGENCY DEPT VISIT HI MDM: CPT

## 2023-04-27 PROCEDURE — 99281 EMR DPT VST MAYX REQ PHY/QHP: CPT

## 2023-04-27 PROCEDURE — 76817 TRANSVAGINAL US OBSTETRIC: CPT | Mod: 26

## 2023-04-27 PROCEDURE — 93975 VASCULAR STUDY: CPT | Mod: 26

## 2023-04-27 NOTE — ED PROVIDER NOTE - RAPID ASSESSMENT
Attending MD Villaseñor:   32F with PMH/PSH including ectopic pregnancy s/p salpingectomy does not remember which side presents to the ED with "they are trending my hormone because I am pregnancy and they are seeing if it is an ectopic again".  Reports that this is her third visit.  Reports today is having increased lower abdominal and lower back pain, reports has used two pads today.  Reports headache, dizziness.  Denies fevers.  Denies vomiting.  Denies chest pain, shortness of breath.  Reports last HCG was checked yesterday and is schedule for check today.  Reports received call today and was told that her US did not show anything.  Denies taking anything for pain.  Denies dysuria.

## 2023-04-27 NOTE — ED PROVIDER NOTE - PROGRESS NOTE DETAILS
Yadiel Mcfarland, PGY2   Patient was Q docked outside with elevating hCGs levels currently 312.  As patient was brought into room, radiology called noting suspicion for right sided ectopic.  OB/GYN was paged and awaiting for callback. Yadiel Mcfarland, PGY2 OB/GYN started methotrexate.     Discussed with patient with patient need to follow-up in their office. patient received her paperwork paper.  Will discharge.

## 2023-04-27 NOTE — ED PROVIDER NOTE - OBJECTIVE STATEMENT
Attending MD Villaseñor:   32F with PMH/PSH including ectopic pregnancy s/p salpingectomy does not remember which side presents to the ED with "they are trending my hormone because I am pregnancy and they are seeing if it is an ectopic again".  Reports that this is her third visit.  Reports today is having increased lower abdominal and lower back pain, reports has used two pads today.  Reports headache, dizziness.  Denies fevers.  Denies vomiting.  Denies chest pain, shortness of breath.  Reports last HCG was checked yesterday and is schedule for check today.  Reports received call today and was told that her US did not show anything.  Denies taking anything for pain.  Denies dysuria. Patient is a 32-year-old female with a history of ectopic pregnancy with salpingectomy who was last menstrual period was 3/22/2023  who has been getting serial hCG checks for no IUP found presenting today with vaginal bleeding was told to come to the ED by her OB/GYN team.  Notes some clotting as well coming from her vagina   And lower abdominal cramping.  Denying any nausea, vomiting, fevers, chills.

## 2023-04-27 NOTE — ED PROVIDER NOTE - PATIENT PORTAL LINK FT
You can access the FollowMyHealth Patient Portal offered by [facility] by registering at the following website: [email or web address]. By joining [employer]’s FollowMyHealth portal, you will also be able to view your health information using other applications (apps) compatible with our system.

## 2023-04-27 NOTE — ED PROVIDER NOTE - CLINICAL SUMMARY MEDICAL DECISION MAKING FREE TEXT BOX
Patient is a 32-year-old female with a history of ectopic pregnancy with salpingectomy who was last menstrual period was 3/22/2023  who has been getting serial hCG checks for no IUP found presenting today with vaginal bleeding was told to come to the ED by her OB/GYN team.  Notes some clotting as well coming from her vagina   And lower abdominal cramping.  Denying any nausea, vomiting, fevers, chills.    Radiology called stating patient has possible ectopic.    Patient is Rh+.  Waiting for OB to see patient. Patient is a 32-year-old female with a history of ectopic pregnancy with salpingectomy who was last menstrual period was 3/22/2023  who has been getting serial hCG checks for no IUP found presenting today with vaginal bleeding was told to come to the ED by her OB/GYN team.  Notes some clotting as well coming from her vagina   And lower abdominal cramping.  Denying any nausea, vomiting, fevers, chills.    Radiology called stating patient has possible ectopic.    Patient is Rh+.  Waiting for OB to see patient. ZR

## 2023-04-27 NOTE — ED PROVIDER NOTE - NSFOLLOWUPINSTRUCTIONS_ED_ALL_ED_FT
It was a pleasure caring for you today.  As discussed you will need to follow-up with your OB/GYN team.  You are sent a prescription for the methotrexate.   Please take this as was prescribed to you.  Please call your OB/GYN's office for follow-up care.    Return to the hospital for any worsening symptoms including fevers or chills, worsening belly pain.     Ectopic Pregnancy  WHAT YOU NEED TO KNOW:  Ectopic pregnancy occurs when a fertilized egg attaches and begins to grow outside of the uterus. The most common place for this to happen is in the fallopian tube. This is sometimes called a tubal pregnancy. The egg can also implant on the outside of the uterus, on the ovary or cervix, or in the abdomen. The egg may begin to grow, but the pregnancy cannot continue normally. Ectopic pregnancy can cause heavy bleeding and may be life-threatening.  DISCHARGE INSTRUCTIONS:  Call your local emergency number (911 in the ) if:   •You have chest pain or trouble breathing.  Call your doctor if:   •You have sharp pain in your lower abdomen that is severe and starts suddenly.  •You feel lightheaded or like you are going to faint.  •You have increasing abdominal or pelvic pain or heavy vaginal bleeding.  •You have shoulder pain.  •You have a fever.  •You have questions or concerns about your condition or care.  Medicines: You may need any of the following:   •Prescription pain medicine may be given. Ask your healthcare provider how to take this medicine safely. Some prescription pain medicines contain acetaminophen. Do not take other medicines that contain acetaminophen without talking to your healthcare provider. Too much acetaminophen may cause liver damage. Prescription pain medicine may cause constipation. Ask your healthcare provider how to prevent or treat constipation.   •Acetaminophen decreases pain and fever. It is available without a doctor's order. Ask how much to take and how often to take it. Follow directions. Read the labels of all other medicines you are using to see if they also contain acetaminophen, or ask your doctor or pharmacist. Acetaminophen can cause liver damage if not taken correctly. Do not use more than 4 grams (4,000 milligrams) total of acetaminophen in one day.   •Take your medicine as directed. Contact your healthcare provider if you think your medicine is not helping or if you have side effects. Tell him or her if you are allergic to any medicine. Keep a list of the medicines, vitamins, and herbs you take. Include the amounts, and when and why you take them. Bring the list or the pill bottles to follow-up visits. Carry your medicine list with you in case of an emergency.  If you received methotrexate:   •Do not have sex, and limit physical activity. Heavy physical activity or sexual intercourse may cause the ectopic pregnancy to rupture. This can be life-threatening. Your healthcare provider will tell you when it is okay to have sex and return to your normal activities.  •Do not get pregnant until your healthcare provider says it is okay. Methotrexate will be harmful to an unborn baby. You will need to wait until at least 1 monthly cycle after you finish the methotrexate course to get pregnant. Your provider may want you to wait until after you have had 3 monthly cycles. This will help make sure all the methotrexate is out of your body.  •Avoid folic acid and NSAID medicines. Folic acid, and NSAIDs, such as ibuprofen, prevent methotrexate from working correctly. Do not take folic acid supplements or have foods that are high in folic acid. Examples include orange juice, breakfast cereal, and leafy green vegetables. Your healthcare provider can give you more information on foods to avoid.  •You may have some spotting and abdominal pain. This may start a few days after you begin taking methotrexate. These are normal and should only last a short time. Talk to your healthcare provider if you have any concerns.  •Limit sunlight exposure. Sunlight can cause a condition caused methotrexate dermatitis (skin irritation).  For support and more information:   •The [employer]  [address] [address]  [place] [address]  Phone: [phone]  Phone: [phone]  Web Address: http://www.acog.org  Follow up with your gynecologist as directed: You may need to return for a follow-up exam, treatment, or blood tests. If you received methotrexate to stop your pregnancy, it is important to come in for follow-up tests. Write down your questions so you remember to ask them during your visits.  Embarazo ectópico  LO QUE NECESITA SABER:  Se produce un embarazo ectópico cuando un óvulo que ha sido fecundado se implanta y comienza a crecer fuera del útero. [place] sucede más comúnmente en las trompas de [place], y a veces se conoce marsha embarazo tubárico. [place] a veces se llama un embarazo tubárico. El óvulo fecundado también se puede implantar en el exterior del útero, en un ovario, el latrell uterino o el abdomen. Es posible que el óvulo fecundado comience a crecer, katherin el embarazo no puede proseguir de forma normal. Un embarazo ectópico puede causar toby hermorragia grave y puede poner en peligro barraza bernard.  INSTRUCCIONES SOBRE EL JESSICA HOSPITALARIA:  Llame al número de emergencias local (911 en los [place]) si:  •Usted tiene dolor torácico y dificultad para respirar.  Llame a barraza médico si:  •Usted tiene dolor nena en la parte baja del abdomen que es severo y comienza de manera repentina.  •Se siente mareada o marsha que se va a desmayar.  •Usted tiene dolor abdominal o pélvico creciente o abundante sangrado vaginal.  •Le duele el hombro.  •Tiene fiebre.  •Usted tiene preguntas o inquietudes acerca de barraza condición o cuidado.  Medicamentos:Es posible que usted necesite alguno de los siguientes:   •Puede administrarsepodrían administrarse. Pregunte al médico cómo debe serafin john medicamento de forma andre. Algunos medicamentos recetados para el dolor contienen acetaminofén. No tome otros medicamentos que contengan acetaminofén sin consultarlo con barraza médico. Demasiado acetaminofeno puede causar daño al hígado. Los medicamentos recetados para el dolor podrían causar estreñimiento. Pregunte a barraza médico marsha prevenir o tratar estreñimiento.  •Acetaminofénalivia el dolor y baja la fiebre. Está disponible sin receta médica. Pregunte la cantidad y la frecuencia con que debe tomarlos. Siga las indicaciones. Cathie las etiquetas de todos los demás medicamentos que esté usando para saber si también contienen acetaminofén, o pregunte a barraza médico o farmacéutico. El acetaminofén puede causar daño en el hígado cuando no se debra de forma correcta. No use más de 4 gramos (4000 miligramos) en total de acetaminofeno en un día.  •Calypso evert medicamentos marsha se le haya indicado.Consulte con barraza médico si usted lance que barraza medicamento no le está ayudando o si presenta efectos secundarios. Infórmele si es alérgico a cualquier medicamento. Mantenga toby lista actualizada de los medicamentos, las vitaminas y los productos herbales que debra. Incluya los siguientes datos de los medicamentos: cantidad, frecuencia y motivo de administración. Traiga con usted la lista o los envases de las píldoras a evert citas de seguimiento. Lleve la lista de los medicamentos con usted en alejandro de toby emergencia.  Si usted recibió metotrexato:  •Evite tener relaciones sexuales y limite la actividad física.La actividad física intensa o las relaciones sexuales pueden causar la ruptura del embarazo ectópico. [place] puede poner en peligro barraza bernard. Barraza médico le indicará cuándo puede tener relaciones sexuales y regresar a evert actividades normales.  •Evite quedar embarazada hasta que barraza médico la autorice.El metotrexato será dañino para un bebé por nacer. Usted tendrá que esperar al menos 1 ciclo menstrual después de giovani terminado el tratamiento con metotrexato para quedar embarazada. Barraza médico puede desear que espere hasta después de giovani tenido 3 ciclos menstruales. [place] ayudará a asegurarse de que ya no tiene metotrexato en el cuerpo.  •Evite el ácido fólico y los antiinflamatorios no esteroides.El ácido fólico y los antiinflamatorios no esteroides, marsha el ibuprofeno, impiden que metotrexato funcione correctamente. No tome suplementos de ácido fólico ni consuma alimentos con alto contenido de ácido fólico. Por ejemplo, jugo de naranja, cereales para el desayuno y verduras de hojas verdes. Barraza médico puede darle más información sobre los alimentos que debe evitar.  •Puede tener sangrado vaginal leve y dolor abdominal.[place] puede comenzar unos días después de empezar a serafin el metotrexato. [place] es normal y debe durar solo un corto tiempo. Consulte con barraza médico en alejandro que tenga inquietudes al respecto.  •Limite la exposición a la varinder solar.La varinder solar puede provocar dermatitis (irritación de la piel), toby condición causada por el metotrexato.  Para apoyo y más información:  •The [employer] Gy[employer]  [address] [address]  [place] [address]  Phone: [phone]  Phone: [phone]  Web Address: http://www.acog.org  Bebo toby christiano de seguimiento con barraza ginecólogo neelima marsha le indiquen:Es posible que deba regresar para hacerse un examen de seguimiento, tratamiento o análisis de gamaliel. Si recibió metotrexato para interrumpir abrraza embarazo, es importante que regreso para realizarse exámenes de control. Anote evert preguntas para que se acuerde de hacerlas kamar evert visitas.

## 2023-04-27 NOTE — ED PROVIDER NOTE - NSICDXPASTMEDICALHX_GEN_ALL_CORE_FT
PAST MEDICAL HISTORY:  Ectopic pregnancy     History of salpingectomy     No pertinent past medical history     No pertinent past medical history

## 2023-04-27 NOTE — ED PROVIDER NOTE - PHYSICAL EXAMINATION
Const: Well-nourished, Well-developed, appearing stated age.  Eyes: no conjunctival injection, and symmetrical lids.  HEENT: Head NCAT, no lesions. Atraumatic external nose and ears. Moist MM.  Neck: Symmetric, trachea midline.   RESP: Unlabored respiratory effort.    GI: Nontender/Nondistended,   MSK: Normocephalic/Atraumatic, Lower Extremities w/o calf tenderness or edema b/l.   Skin: Warm, dry and intact.   Neuro: CNs II-XII grossly intact. Motor & Sensation grossly intact.  Psych: Awake, Alert, & Oriented (AAO) x3. Appropriate mood and affect.   Pelvic exam chaperoned by ED  tech: Patient has mild blood at the vaginal opening, right adnexal tenderness, closed cervical os.

## 2023-04-27 NOTE — ED ADULT NURSE NOTE - OBJECTIVE STATEMENT
33 y/o female A&OX4, came to the ED with complaints of vaginal bleeding, abdominal pains, 5 weeks pregnant. Has been seen here the past few days to monitor HCG levels. Went through 2 pads today, and has noticed blood clots. right/mid abdominal pain and tenderness. Denies vaginal discharge, CP, SOB, fevers, chills, N, V, D. OB in to see and examine patient.

## 2023-04-27 NOTE — ED ADULT NURSE NOTE - CINV DISCH TEACH PARTICIP
OK with giving her excuse for the week.  If she is not improved from when I saw her or if she needs additional time after this week she needs to be re-evaluated Patient/Significant other

## 2023-04-27 NOTE — ED PROVIDER NOTE - DISPOSITION TYPE
1/5/21, Called Dr. Yee's office, left message with Shayy regarding Dr. Shelby's request for PCP clearance regarding pt's previous seizure activity.   DISCHARGE

## 2023-04-28 VITALS
HEART RATE: 63 BPM | SYSTOLIC BLOOD PRESSURE: 100 MMHG | OXYGEN SATURATION: 98 % | TEMPERATURE: 98 F | DIASTOLIC BLOOD PRESSURE: 66 MMHG | RESPIRATION RATE: 17 BRPM

## 2023-04-28 PROBLEM — Z78.9 OTHER SPECIFIED HEALTH STATUS: Chronic | Status: ACTIVE | Noted: 2022-01-03

## 2023-04-28 PROBLEM — Z90.79 ACQUIRED ABSENCE OF OTHER GENITAL ORGAN(S): Chronic | Status: ACTIVE | Noted: 2023-04-23

## 2023-04-28 PROCEDURE — 36415 COLL VENOUS BLD VENIPUNCTURE: CPT

## 2023-04-28 PROCEDURE — 76817 TRANSVAGINAL US OBSTETRIC: CPT

## 2023-04-28 PROCEDURE — 80053 COMPREHEN METABOLIC PANEL: CPT

## 2023-04-28 PROCEDURE — 84702 CHORIONIC GONADOTROPIN TEST: CPT

## 2023-04-28 PROCEDURE — 86850 RBC ANTIBODY SCREEN: CPT

## 2023-04-28 PROCEDURE — 85025 COMPLETE CBC W/AUTO DIFF WBC: CPT

## 2023-04-28 PROCEDURE — 93975 VASCULAR STUDY: CPT

## 2023-04-28 PROCEDURE — 86901 BLOOD TYPING SEROLOGIC RH(D): CPT

## 2023-04-28 PROCEDURE — 86900 BLOOD TYPING SEROLOGIC ABO: CPT

## 2023-04-28 PROCEDURE — 99285 EMERGENCY DEPT VISIT HI MDM: CPT | Mod: 25

## 2023-04-28 PROCEDURE — 96372 THER/PROPH/DIAG INJ SC/IM: CPT

## 2023-04-28 RX ORDER — METHOTREXATE 2.5 MG/1
87.5 TABLET ORAL ONCE
Refills: 0 | Status: COMPLETED | OUTPATIENT
Start: 2023-04-28 | End: 2023-04-28

## 2023-04-28 RX ADMIN — METHOTREXATE 87.5 MILLIGRAM(S): 2.5 TABLET ORAL at 04:20

## 2023-04-28 NOTE — CONSULT NOTE ADULT - ASSESSMENT
33yo  at LMP 3/27 GA 4w4d h/o left ectopic pregnancy s/p lsc left salpingectomy who has been followed on [facility] list for PUL, now presents due to abdominal cramping + moderate vaginal bleeding. TVUS concerning for R ectopic pregnancy. bHCG and HPI more consistent w/ SAB given bHCG 28 () -> 158 ()  -> 669 () -> 312 (). Patient clinically and hemodynamically stable with no signs of rupture on imaging.  Patient asymtomatic with benign abdomen.   Ectopic is <3.5 cm with no heart beat or contraindication for medical managment. b-hcg is       - reasonable value to treat with medical therapy.  Patient is candidate for treatment with methotrexate therapy.       - Patient's labs and imaging were reviewed and patient was counseled on options for treatment of ectopic pregnancy including methotrexate therapy vs. surgical intervention.   Patient opts for methotrexate therapy.    - MTX ordered, script given for May 1st Day 4 bHCG, May 4th Day 7th. No further GYN intervention at this time.  - Reviewed emergency return precautions for ectopic pregnancy with excellent teach back.  - Call GYN for any questions or concerns - [phone] [phone].    Amyeo Afroz Jereen, PGY-2  Seen and discussed w/ Dr Delong

## 2023-04-28 NOTE — CONSULT NOTE ADULT - ATTENDING COMMENTS
Attending note    Read and agree with above PGY2 note    33yo  at 4w4d by LMP (3/27) known to GYN service for serial bHCG for pregnancy of unknown location, now with possible ectopic. Patient presented today with vaginal bleeding and passage of clots. HCG decreased from 669 to 312 from  to . Patient notes mild cramping and RLQ pain. Ultraound today showing R adnexal mass concerning for ectopic pregnancy, no free fluid. Review of previous sono shows R sided corpus luteum cyst on . Vital signs within normal limits.     T(C): 36.6 (23 @ 01:49), Max: 36.7 (23 @ 20:15)  HR: 63 (23 @ 01:49) (63 - 63)  BP: 94/53 (23 @ 01:49) (94/53 - 104/68)  RR: 16 (23 @ 01:49) (16 - 16)  SpO2: 100% (23 @ 01:49) (99% - 100%)    Physical Exam:  Gen: NAD  CV: RRR  Pulm: nonlabored on RA  Abd: soft, nondistended, no peritoneal signs, no rebound/guarding, mild tenderness to palpation in RLQ and suprapubically    A/P    Patient extensively counseled that this is a non-viable pregnancy due to downtrending beta, however, uncertain if early SAB vs ectopic. She was counseled on following options: expectant management vs medical management with cytotec (no products seen in uterus so not recommended) vs surgery (no surgical indication at this time) vs methotrexate administration. Counseled that pregnancy may resolve as beta is downtrending but still at risk for rupture. Patient wishes to proceed with methotrexate for definitive management.   -Counseled on precautions, risks/benefits/alternatives, use of contraception, need for D4, D7 hcg.   -Patient counseled on return precautions.   -Consents signed, all questions answered.   -87.5mg mtx ordered.   -Placed on resident b-hcg follow up list.     TUNDE Delong MD  Service attending

## 2023-04-28 NOTE — CONSULT NOTE ADULT - SUBJECTIVE AND OBJECTIVE BOX
SYLVIE MANTILLA  32y  Female [id number]    HPI: 31yo  at LMP 3/27 GA 4w4d h/o left ectopic pregnancy s/p lsc left salpingectomy who has been followed on [facility] list for PUL, now presents due to abdominal cramping + moderate vaginal bleeding. Patient reported 10/10 pain at around 5p, noticed clots in toilet, but the time she presented to the ED, her pain resolved to 4/10. She she has not required any pain medications since being in ED. Currently no VB. Patient denies chest pain, shortness of breath, fevers, chills, nausea, vomiting, diarrhea.    OBHx: 2012 FT , 2017 ectopic pregnancy s/p left salpingectomy  GYNHx: denies fibroids, cysts, abnormal paps, STDs  PMH: denies  PSH: lsc left salpingectomy  Meds: PNVs  All: NKDA  Soc: no substance use, anxiety/depression    accepts blood      Vital Signs Last 24 Hrs  T(C): 36.6 (2023 01:49), Max: 36.7 (2023 20:15)  T(F): 97.8 (2023 01:49), Max: 98 (2023 20:15)  HR: 63 (2023 01:49) (63 - 63)  BP: 94/53 (2023 01:49) (94/53 - 104/68)  BP(mean): 64 (2023 01:49) (64 - 64)  RR: 16 (2023 01:49) (16 - 16)  SpO2: 100% (2023 01:49) (99% - 100%)    Parameters below as of 2023 01:49  Patient On (Oxygen Delivery Method): room air        Physical Exam:   General: sitting comftorably in bed, NAD   HEENT: neck supple, full ROM  CV: RR S1S2 no m/r/g  Lungs: CTA b/l, good air flow b/l   Back: No CVA tenderness  Abd: Soft, non-tender, non-distended.  Bowel sounds present.    :  No bleeding on pad.    External labia wnl.  Bimanual exam with no cervical motion tenderness, uterus wnl, adnexa non palpable b/l.  Cervix closed cm.   Speculum Exam: No active bleeding from os.  Dark red discharge.    Ext: non-tender b/l, no edema     LABS:                              11.9   5.57  )-----------( 183      ( 2023 21:38 )             35.7         138  |  104  |  16  ----------------------------<  94  3.6   |  25  |  0.64    Ca    8.5      2023 21:38    TPro  7.0  /  Alb  4.3  /  TBili  0.2  /  DBili  x   /  AST  15  /  ALT  13  /  AlkPhos  58      I&O's Detail          RADIOLOGY & ADDITIONAL STUDIES:  < from: US Transvaginal, OB (23 @ 22:53) >  TECHNIQUE:  Endovaginal and transabdominal pelvic sonogram. Color and Spectral   Doppler was performed.    FINDINGS:  Uterus: 9.3 x 5.5 x 6.4 cm. No intrauterine gestation. Fluid visualized   within the endometrial canal.    Right ovary: 2.8 x 1.7 x 2.4 cm. There is a complex appearing extra   adnexal cyst/mass with peripheral increased echogenicity andsmall   central anechoic focus. Normal arterial and venous waveforms.  Left ovary: 3.1 x 1.4 x 2.3 cm. Within normal limits. Normal arterial and   venous waveforms.    Fluid: None.    IMPRESSION:  No evidence for intrauterine pregnancy. A complex right extra adnexal   cyst/mass, most suggestive of an ectopic pregnancy. No free fluid in the   pelvis.    Findings were discussed by Dr. Hodge with Dr. Mcfarland on 2023   10:49 PM with read back confirmation.    --- End of Report ---          MICHAEL HODGE MD; Resident Radiologist  This document has been electronically signed.  JOANNE BERKOWITZ MD; Attending Radiologist  This document has been electronically signed. 2023 11:16PM    < end of copied text >   SYLVIE MANTILLA  32y  Female [id number]    HPI: 33yo  at LMP 3/27 GA 4w4d h/o left ectopic pregnancy s/p lsc left salpingectomy who has been followed on [facility] list for PUL, now presents due to abdominal cramping + moderate vaginal bleeding. Patient reported 10/10 pain at around 5p, noticed clots in toilet, but the time she presented to the ED, her pain resolved to 4/10. She she has not required any pain medications since being in ED. Currently no VB. Patient denies chest pain, shortness of breath, fevers, chills, nausea, vomiting, diarrhea.    OBHx: 2012 FT , 2017 ectopic pregnancy s/p left salpingectomy  GYNHx: denies fibroids, cysts, abnormal paps, STDs  PMH: denies  PSH: lsc left salpingectomy  Meds: PNVs  All: NKDA  Soc: no substance use, anxiety/depression    accepts blood      Vital Signs Last 24 Hrs  T(C): 36.6 (2023 01:49), Max: 36.7 (2023 20:15)  T(F): 97.8 (2023 01:49), Max: 98 (2023 20:15)  HR: 63 (2023 01:49) (63 - 63)  BP: 94/53 (2023 01:49) (94/53 - 104/68)  BP(mean): 64 (2023 01:49) (64 - 64)  RR: 16 (2023 01:49) (16 - 16)  SpO2: 100% (2023 01:49) (99% - 100%)    Parameters below as of 2023 01:49  Patient On (Oxygen Delivery Method): room air        Physical Exam:   General: sitting comftorably in bed, NAD   HEENT: neck supple, full ROM  CV: RR S1S2 no m/r/g  Lungs: CTA b/l, good air flow b/l   Back: No CVA tenderness  Abd: Soft, moderately tender at midline w/ mild tenderness of the right lower quadrant.  Bowel sounds present.    :  No bleeding on pad.  External labia wnl. + R adnexal tenderness. Bimanual exam with no cervical motion tenderness, uterus wnl, adnexa non palpable b/l.  Cervix closed cm.   Speculum Exam: No active bleeding from os.  Dark red discharge.    Ext: non-tender b/l, no edema     LABS:                              11.9   5.57  )-----------( 183      ( 2023 21:38 )             35.7         138  |  104  |  16  ----------------------------<  94  3.6   |  25  |  0.64    Ca    8.5      2023 21:38    TPro  7.0  /  Alb  4.3  /  TBili  0.2  /  DBili  x   /  AST  15  /  ALT  13  /  AlkPhos  58      I&O's Detail          RADIOLOGY & ADDITIONAL STUDIES:  < from: US Transvaginal, OB (23 @ 22:53) >  TECHNIQUE:  Endovaginal and transabdominal pelvic sonogram. Color and Spectral   Doppler was performed.    FINDINGS:  Uterus: 9.3 x 5.5 x 6.4 cm. No intrauterine gestation. Fluid visualized   within the endometrial canal.    Right ovary: 2.8 x 1.7 x 2.4 cm. There is a complex appearing extra   adnexal cyst/mass with peripheral increased echogenicity andsmall   central anechoic focus. Normal arterial and venous waveforms.  Left ovary: 3.1 x 1.4 x 2.3 cm. Within normal limits. Normal arterial and   venous waveforms.    Fluid: None.    IMPRESSION:  No evidence for intrauterine pregnancy. A complex right extra adnexal   cyst/mass, most suggestive of an ectopic pregnancy. No free fluid in the   pelvis.    Findings were discussed by Dr. Hodge with Dr. Mcfarland on 2023   10:49 PM with read back confirmation.    --- End of Report ---          MICHAEL HODGE MD; Resident Radiologist  This document has been electronically signed.  JOANNE BERKOWITZ MD; Attending Radiologist  This document has been electronically signed. 2023 11:16PM    < end of copied text >

## 2023-04-28 NOTE — ED ADULT NURSE REASSESSMENT NOTE - NS ED NURSE REASSESS COMMENT FT1
pt weighed prior to administering . methotrexate administered with 2 RN. OB Md verified  paper  in chart.

## 2023-05-14 NOTE — CHART NOTE - NSCHARTNOTESELECT_GEN_ALL_CORE
R2 Telephone Note/Event Note
b[facility] phone call - R1
bhcg follow up/Event Note

## 2023-05-14 NOTE — CHART NOTE - NSCHARTNOTEFT_GEN_A_CORE
Patient called to inquire if she was able to get Day 4 bHCG drawn yesterday, as no results are available upon chart review. Pt states she was not able to go for blood draw yesterday, and that she is working until 5pm today. I provided patient with address/phone number for [facility] in [place] that is open until 8pm on Tuesdays. Reinforced to pt that if she is unable to make it to the lab by 8pm, she should present to the [facility] ED for bHCG. Patient expressed understanding and repeated back address/phone number of the lab.    Kei PGY1
[facility] Spoke with patient on the phone and they confirmed they will have blood drawn. Will continue to follow until beta HCG is negative.    LUCINDA Hurst, PGY2
Patient received Methotrexate on 4/28 for medical management of suspected tubal ectopic pregnancy. Pt has been intermittently non-compliant with serial bHCGs despite thorough counseling. She presented to [facility] lab for Day 4 bHCG on Day 5, and was instructed to return again on Day 7 (5/5) for repeat. Patient was called this morning (5/8) due to no record of repeat bHCG from the past several days. She states the lab was closed when she tried to go on 5/5. Reinforced importance of adherence to serial bHCGs, patient stated she would go 5/8.    Patient called today, 5/9, due to no record of additional bHCG obtained by [facility] lab. Pt states she went to the lab on 5/8 and was told there was no bHCG order. New additional bHCG request ordered via [employer], patient states she can go tomorrow 5/10. Will follow up results via HIE.    Kei PGY1  [facility] GYN team
Patient received Methotrexate on 4/28 for medical management of suspected tubal ectopic pregnancy. Pt has been non-compliant with serial bHCGs despite thorough counseling. She presented to [facility] lab for Day 4 bHCG on Day 5, and was instructed to return again on Day 7 (5/5) for repeat. Patient was called this morning (5/8) due to no record of repeat bHCG from the past several days. She states the lab was closed when she tried to go on 5/5. Reinforced importance of adherence to serial bHCGs, patient states she can go today at noon. Will follow result and update patient with plan.    Kei PGY1
Note delayed secondary to clinical duties.    Called patient on 5/13 and left VM instructing patient to come into ED ASAP. Left clinic phone number for patient.    TUNDE Shafer, PGY2
Patient received Methotrexate on 4/28 for medical management of suspected tubal ectopic pregnancy. Pt has been intermittently non-compliant with serial bHCGs despite thorough counseling. She presented to [facility] for Day 4 bHCG on Day 5, and was instructed to return again on Day 7 (5/5) for repeat. Patient did not return on 5/5 for repeat.    We last got in contact with the patient on 5/8, at which time she stated she would go that day. She did not go to the lab on 5/8, and has since not picked up any daily f/u phone calls. Tried to call patient again today, 5/12, no answer. Voicemail left recommending that pt go to [facility] ED for repeat labs.    Kei PGY1  [facility] GYN team.

## 2023-05-15 ENCOUNTER — EMERGENCY (EMERGENCY)
Facility: HOSPITAL | Age: 33
LOS: 1 days | Discharge: HOME | End: 2023-05-15
Attending: EMERGENCY MEDICINE
Payer: SELF-PAY

## 2023-05-15 VITALS
HEART RATE: 64 BPM | DIASTOLIC BLOOD PRESSURE: 54 MMHG | SYSTOLIC BLOOD PRESSURE: 109 MMHG | RESPIRATION RATE: 18 BRPM | WEIGHT: 147.05 LBS | TEMPERATURE: 98 F | OXYGEN SATURATION: 98 % | HEIGHT: 63 IN

## 2023-05-15 DIAGNOSIS — O00.90 UNSPECIFIED ECTOPIC PREGNANCY WITHOUT INTRAUTERINE PREGNANCY: Chronic | ICD-10-CM

## 2023-05-15 LAB
BASOPHILS # BLD AUTO: 0.01 K/UL — SIGNIFICANT CHANGE UP (ref 0–0.2)
BASOPHILS NFR BLD AUTO: 0.2 % — SIGNIFICANT CHANGE UP (ref 0–2)
EOSINOPHIL # BLD AUTO: 0.05 K/UL — SIGNIFICANT CHANGE UP (ref 0–0.5)
EOSINOPHIL NFR BLD AUTO: 1 % — SIGNIFICANT CHANGE UP (ref 0–6)
HCT VFR BLD CALC: 33.7 % — LOW (ref 34.5–45)
HGB BLD-MCNC: 11 G/DL — LOW (ref 11.5–15.5)
IMM GRANULOCYTES NFR BLD AUTO: 0.2 % — SIGNIFICANT CHANGE UP (ref 0–0.9)
LYMPHOCYTES # BLD AUTO: 2.25 K/UL — SIGNIFICANT CHANGE UP (ref 1–3.3)
LYMPHOCYTES # BLD AUTO: 46 % — HIGH (ref 13–44)
MCHC RBC-ENTMCNC: 31 PG — SIGNIFICANT CHANGE UP (ref 27–34)
MCHC RBC-ENTMCNC: 32.6 GM/DL — SIGNIFICANT CHANGE UP (ref 32–36)
MCV RBC AUTO: 94.9 FL — SIGNIFICANT CHANGE UP (ref 80–100)
MONOCYTES # BLD AUTO: 0.26 K/UL — SIGNIFICANT CHANGE UP (ref 0–0.9)
MONOCYTES NFR BLD AUTO: 5.3 % — SIGNIFICANT CHANGE UP (ref 2–14)
NEUTROPHILS # BLD AUTO: 2.31 K/UL — SIGNIFICANT CHANGE UP (ref 1.8–7.4)
NEUTROPHILS NFR BLD AUTO: 47.3 % — SIGNIFICANT CHANGE UP (ref 43–77)
NRBC # BLD: 0 /100 WBCS — SIGNIFICANT CHANGE UP (ref 0–0)
PLATELET # BLD AUTO: 199 K/UL — SIGNIFICANT CHANGE UP (ref 150–400)
RBC # BLD: 3.55 M/UL — LOW (ref 3.8–5.2)
RBC # FLD: 13.5 % — SIGNIFICANT CHANGE UP (ref 10.3–14.5)
WBC # BLD: 4.89 K/UL — SIGNIFICANT CHANGE UP (ref 3.8–10.5)
WBC # FLD AUTO: 4.89 K/UL — SIGNIFICANT CHANGE UP (ref 3.8–10.5)

## 2023-05-15 PROCEDURE — 99285 EMERGENCY DEPT VISIT HI MDM: CPT

## 2023-05-15 NOTE — ED PROVIDER NOTE - CARE PLAN
1 Principal Discharge DX:	Evaluation by medical service required  Secondary Diagnosis:	History of miscarriage

## 2023-05-15 NOTE — ED ADULT NURSE NOTE - NSFALLUNIVINTERV_ED_ALL_ED
Bed/Stretcher in lowest position, wheels locked, appropriate side rails in place/Call bell, personal items and telephone in reach/Instruct patient to call for assistance before getting out of bed/chair/stretcher/Non-slip footwear applied when patient is off stretcher/[place] to call system/Physically safe environment - no spills, clutter or unnecessary equipment/Purposeful proactive rounding/Room/bathroom lighting operational, light cord in reach

## 2023-05-15 NOTE — ED PROVIDER NOTE - PATIENT PORTAL LINK FT
You can access the FollowMyHealth Patient Portal offered by [facility] by registering at the following website: [email or web address]. By joining [facility]’s FollowMyHealth portal, you will also be able to view your health information using other applications (apps) compatible with our system.

## 2023-05-15 NOTE — ED ADULT TRIAGE NOTE - CHIEF COMPLAINT QUOTE
Pt had miscarriage at 5 weeks, experienced vaginal bleeding. Now here for HCG trend and suprapubic pain. Denies vaginal bleeding

## 2023-05-15 NOTE — ED PROVIDER NOTE - OBJECTIVE STATEMENT
33 F  presenting to ED for bHCG check. Pt had tubal ectopic dx on  and received methotrexate. At that time hcg 312, follow up on  121. Pt was contacted multiple times by OBGYN to come in for 7 day repeat hcg. Pt states she was unable to come in because she was busy at work. Instructed by OBGYN to come in to ED for repeat bhcg. Has mild suprapubic abdominal tenderness. No fever, vaginal bleeding, vaginal discharge, dysuria, hematuria.

## 2023-05-15 NOTE — ED PROVIDER NOTE - PROGRESS NOTE DETAILS
Endorsed to Dr Bao Kaplan MD, Facep Jigna Can MD, PGY-3: Spoke with OB, cleared from their perspective given beta-hCG is negative.  Will DC patient pending urine results Jigna Can MD, PGY-3: UA negative, dc ready.

## 2023-05-15 NOTE — CHART NOTE - NSCHARTNOTEFT_GEN_A_CORE
Briefly patient is a 31yo  who received MTX on  (bHC) due to concerns for ectopic pregnancy. Patient's day 4 bHCG was 121 (). Patient was called over the weekend multiple times to remind patient to come to ED for day 7 bHCG; however, patient did not  the phone. VM were left. On phone today (5/15) patient confirmed that she would come to the ED this afternoon/evening.    RUBY Jay PGY2

## 2023-05-15 NOTE — ED PROVIDER NOTE - NSFOLLOWUPINSTRUCTIONS_ED_ALL_ED_FT
--today, the lab tests we did include basic blood tests, pregnancy test. results significant for no abnormalities - negative pregnancy tests. we have included these test results in your paperwork. please take them to your follow-up appointment  --given that you were in the ED today, we recommend a followup visit with your OB doctor within 7 days.   --return to the ED if heavy vaginal bleeding, abdominal cramping.

## 2023-05-15 NOTE — ED ADULT NURSE NOTE - NS ED NOTE  TALK SOMEONE YN
We are committed to providing our patients with their test results in a timely manner. If you have access to [facility], you will receive your results within 5 to 7 days. If your results are normal, a member of our office may call you or you may receive a letter in the mail within 10 to 15 days of your testing. If your results have something additional to discuss, a member of our office will contact you by phone. If at any time you have questions related your results, please feel free to call our office at [phone]   No

## 2023-05-15 NOTE — ED ADULT NURSE NOTE - OBJECTIVE STATEMENT
33YF A&OX4 Ambulatory  presents to the ED for bHCG check s/p receiving methotrexate  for dx of tubal ectopic pregnancy on . at the time, HCG was 312 then  on  and pt reports having 1 week of vaginal bleeding and denies active bleeding at this time. pt was contacted by OBGYN department multiple times for repeat HCG and is here to trend HCG and for + suprapubic pain/tenderness. pt denies f/c/n/v/d, dizziness, HA, urinary symptoms.

## 2023-05-15 NOTE — ED PROVIDER NOTE - ATTENDING CONTRIBUTION TO CARE
Private Physician None  33y female   LMP 3/27 GA 3w6d h/o left ectopic pregnancy s/p lsc left salpingectomy, Tx w methotrexate 23 for PUL now lost to fu and now presents for abd pain suprapubic onset 3 day ago. "pinching' today was trying to work and pain worse. No nvd, fever, vag bleeding.dizziness. Private Physician None  33y female   LMP 3/27 GA 3w6d h/o left ectopic pregnancy s/p lsc left salpingectomy, Tx w methotrexate 23 for PUL now lost to fu and now presents for abd pain suprapubic onset 3 day ago. "pinching' today was trying to work and pain worse. No nvd, fever, vag bleeding.dizziness. PE WDWN female awake alert normocephalic atraumatic neck supple chest clear anterior & posterior cv no rubs, gallops or murmurs abd min suprapubic ttp no rebound guarding mass, No cvat, neruo no focal defects  Dex Kaplan MD, Facep

## 2023-05-15 NOTE — ED PROVIDER NOTE - PHYSICAL EXAMINATION
Gen: NAD  HEENT: mucous membranes moist  CV: RRR, +S1/S2, no M/R/G, 2+ radial pulses b/l  Resp: CTAB, no W/R/R, no accessory muscle use, no increased work of breathing  GI: +suprapubic abdominal tenderness to palpation, no rebound or guarding  MSK: no LE edema  Neuro: following commands, moving all four extremities spontaneously  Psych: appropriate mood

## 2023-05-15 NOTE — ED PROVIDER NOTE - CLINICAL SUMMARY MEDICAL DECISION MAKING FREE TEXT BOX
33 F  who received methotrexate on  for tubal ectopic pregnancy. Supposed to follow up for repeat serial bhcgs but has been non compliant. Has not yet gotten 7 day bHGC. trend:  (312),  (121). Also endorsing mild suprapubic abdominal pain. No vaginal bleeding, vaginal discharge, dysuria. Spoke with OBGYN, requesting basic labs and hcg. Will touch base with OBGYN after hcg result. Also getting urinalysis and UA due to suprapubic pain. Will reassess.

## 2023-05-16 VITALS
TEMPERATURE: 98 F | DIASTOLIC BLOOD PRESSURE: 56 MMHG | RESPIRATION RATE: 18 BRPM | SYSTOLIC BLOOD PRESSURE: 101 MMHG | OXYGEN SATURATION: 100 % | HEART RATE: 72 BPM

## 2023-05-16 LAB
ALBUMIN SERPL ELPH-MCNC: 4.1 G/DL — SIGNIFICANT CHANGE UP (ref 3.3–5)
ALP SERPL-CCNC: 57 U/L — SIGNIFICANT CHANGE UP (ref 40–120)
ALT FLD-CCNC: 19 U/L — SIGNIFICANT CHANGE UP (ref 10–45)
ANION GAP SERPL CALC-SCNC: 8 MMOL/L — SIGNIFICANT CHANGE UP (ref 5–17)
APPEARANCE UR: ABNORMAL
AST SERPL-CCNC: 20 U/L — SIGNIFICANT CHANGE UP (ref 10–40)
BACTERIA # UR AUTO: NEGATIVE — SIGNIFICANT CHANGE UP
BILIRUB SERPL-MCNC: 0.2 MG/DL — SIGNIFICANT CHANGE UP (ref 0.2–1.2)
BILIRUB UR-MCNC: NEGATIVE — SIGNIFICANT CHANGE UP
BUN SERPL-MCNC: 16 MG/DL — SIGNIFICANT CHANGE UP (ref 7–23)
CALCIUM SERPL-MCNC: 8.7 MG/DL — SIGNIFICANT CHANGE UP (ref 8.4–10.5)
CHLORIDE SERPL-SCNC: 106 MMOL/L — SIGNIFICANT CHANGE UP (ref 96–108)
CO2 SERPL-SCNC: 25 MMOL/L — SIGNIFICANT CHANGE UP (ref 22–31)
COLOR SPEC: SIGNIFICANT CHANGE UP
CREAT SERPL-MCNC: 0.61 MG/DL — SIGNIFICANT CHANGE UP (ref 0.5–1.3)
DIFF PNL FLD: NEGATIVE — SIGNIFICANT CHANGE UP
EGFR: 122 ML/MIN/1.73M2 — SIGNIFICANT CHANGE UP
EPI CELLS # UR: 8 /HPF — HIGH
GLUCOSE SERPL-MCNC: 101 MG/DL — HIGH (ref 70–99)
GLUCOSE UR QL: NEGATIVE — SIGNIFICANT CHANGE UP
HCG SERPL-ACNC: <2 MIU/ML — SIGNIFICANT CHANGE UP
HYALINE CASTS # UR AUTO: 1 /LPF — SIGNIFICANT CHANGE UP (ref 0–2)
KETONES UR-MCNC: NEGATIVE — SIGNIFICANT CHANGE UP
LEUKOCYTE ESTERASE UR-ACNC: NEGATIVE — SIGNIFICANT CHANGE UP
NITRITE UR-MCNC: NEGATIVE — SIGNIFICANT CHANGE UP
PH UR: 7 — SIGNIFICANT CHANGE UP (ref 5–8)
POTASSIUM SERPL-MCNC: 4.1 MMOL/L — SIGNIFICANT CHANGE UP (ref 3.5–5.3)
POTASSIUM SERPL-SCNC: 4.1 MMOL/L — SIGNIFICANT CHANGE UP (ref 3.5–5.3)
PROT SERPL-MCNC: 6.8 G/DL — SIGNIFICANT CHANGE UP (ref 6–8.3)
PROT UR-MCNC: ABNORMAL
RBC CASTS # UR COMP ASSIST: 0 /HPF — SIGNIFICANT CHANGE UP (ref 0–4)
SODIUM SERPL-SCNC: 139 MMOL/L — SIGNIFICANT CHANGE UP (ref 135–145)
SP GR SPEC: 1.03 — HIGH (ref 1.01–1.02)
UROBILINOGEN FLD QL: NEGATIVE — SIGNIFICANT CHANGE UP
WBC UR QL: 3 /HPF — SIGNIFICANT CHANGE UP (ref 0–5)

## 2023-05-16 PROCEDURE — 80053 COMPREHEN METABOLIC PANEL: CPT

## 2023-05-16 PROCEDURE — 81001 URINALYSIS AUTO W/SCOPE: CPT

## 2023-05-16 PROCEDURE — 99284 EMERGENCY DEPT VISIT MOD MDM: CPT

## 2023-05-16 PROCEDURE — 85025 COMPLETE CBC W/AUTO DIFF WBC: CPT

## 2023-05-16 PROCEDURE — 87086 URINE CULTURE/COLONY COUNT: CPT

## 2023-05-16 PROCEDURE — 84702 CHORIONIC GONADOTROPIN TEST: CPT

## 2023-05-17 LAB
CULTURE RESULTS: SIGNIFICANT CHANGE UP
SPECIMEN SOURCE: SIGNIFICANT CHANGE UP

## 2023-05-18 NOTE — ED POST DISCHARGE NOTE - RESULT SUMMARY
UCX w/ 50-99 GBS. Pt UA appeared negative and she denied dysuria/hematuria. Pt is not pregnant. Would not treat asymptomatic bacteruria with colony cound <100K. Will clear results - Keiry Corona PA-C

## 2023-12-06 NOTE — ED ADULT NURSE NOTE - CCCP TRG CHIEF CMPLNT
BP has been high since we stopped HCTZ and replaced it with lasix. This was done to reduce gout flares but still mitigate her leg swelling. BP uncontrolled today in the office. She is taking nadolol and lasix. She was started on amlodipine for HTN 11/3/23 and is taking this as well. Will add olmesartan 20mg. She will return next week for a nurse visit for BP check. She will check at home in the meantime and bring her BP log and cuff to the nurse visit. Check lab in 1 week.
head injury

## 2024-03-25 NOTE — H&P ADULT. - PULMONARY EMBOLUS
Caller requests Refill of:  amLODIPine (NORVASC) 2.5 MG tablet       Please send to:    [facility] [id number] - [place], [place] - [address] - P [phone] - F [phone] [address] [address]  Phone: [phone] Fax: [phone]         Visit / Appointment History:  Future Appointment at [facility]:  5/28/2024       Last Appointment With PCP:  12/28/2023       Caller confirmed instructions and dosages as correct.    Caller was advised that Meds will be refilled as soon as possible, however there can be a 48-72 business hour turn around on refill requests.   no

## 2024-05-04 ENCOUNTER — APPOINTMENT (OUTPATIENT)
Dept: INTERNAL MEDICINE | Facility: CLINIC | Age: 34
End: 2024-05-04
Payer: COMMERCIAL

## 2024-05-04 VITALS
WEIGHT: 141 LBS | BODY MASS INDEX: 24.07 KG/M2 | OXYGEN SATURATION: 99 % | HEART RATE: 55 BPM | HEIGHT: 64 IN | SYSTOLIC BLOOD PRESSURE: 104 MMHG | DIASTOLIC BLOOD PRESSURE: 68 MMHG

## 2024-05-04 DIAGNOSIS — L98.9 DISORDER OF THE SKIN AND SUBCUTANEOUS TISSUE, UNSPECIFIED: ICD-10-CM

## 2024-05-04 DIAGNOSIS — Z00.00 ENCOUNTER FOR GENERAL ADULT MEDICAL EXAMINATION W/OUT ABNORMAL FINDINGS: ICD-10-CM

## 2024-05-04 PROCEDURE — 36415 COLL VENOUS BLD VENIPUNCTURE: CPT

## 2024-05-04 PROCEDURE — 99385 PREV VISIT NEW AGE 18-39: CPT

## 2024-05-07 NOTE — HEALTH RISK ASSESSMENT
[Very Good] : ~his/her~  mood as very good [No] : No [No falls in past year] : Patient reported no falls in the past year [0] : 2) Feeling down, depressed, or hopeless: Not at all (0) [de-identified] : walking [de-identified] : healthy [[id number]] : 0 [Patient reported PAP Smear was normal] : Patient reported PAP Smear was normal [] :  [Fully functional (bathing, dressing, toileting, transferring, walking, feeding)] : Fully functional (bathing, dressing, toileting, transferring, walking, feeding) [Fully functional (using the telephone, shopping, preparing meals, housekeeping, doing laundry, using] : Fully functional and needs no help or supervision to perform IADLs (using the telephone, shopping, preparing meals, housekeeping, doing laundry, using transportation, managing medications and managing finances) [Reports changes in hearing] : Reports no changes in hearing [Reports changes in vision] : Reports no changes in vision [Reports changes in dental health] : Reports no changes in dental health [Smoke Detector] : smoke detector [Carbon Monoxide Detector] : carbon monoxide detector [Seat Belt] :  uses seat belt [Sunscreen] : uses sunscreen [TB Exposure] : is being exposed to tuberculosis [Caregiver Concerns] : has caregiver concerns [Never] : Never [PapSmearDate] : 2020

## 2024-05-08 LAB
ALBUMIN SERPL ELPH-MCNC: 4.6 G/DL
ALP BLD-CCNC: 59 U/L
ALT SERPL-CCNC: 27 U/L
ANION GAP SERPL CALC-SCNC: 11 MMOL/L
AST SERPL-CCNC: 23 U/L
BILIRUB SERPL-MCNC: 0.5 MG/DL
BUN SERPL-MCNC: 10 MG/DL
CALCIUM SERPL-MCNC: 9.1 MG/DL
CHLORIDE SERPL-SCNC: 106 MMOL/L
CHOLEST SERPL-MCNC: 173 MG/DL
CO2 SERPL-SCNC: 24 MMOL/L
CREAT SERPL-MCNC: 0.69 MG/DL
EGFR: 119 ML/MIN/1.73M2
GLUCOSE SERPL-MCNC: 91 MG/DL
HCT VFR BLD CALC: 41.3 %
HDLC SERPL-MCNC: 50 MG/DL
HGB BLD-MCNC: 13.1 G/DL
LDLC SERPL CALC-MCNC: 106 MG/DL
MCHC RBC-ENTMCNC: 31.1 PG
MCHC RBC-ENTMCNC: 31.7 GM/DL
MCV RBC AUTO: 98.1 FL
NONHDLC SERPL-MCNC: 123 MG/DL
PLATELET # BLD AUTO: 199 K/UL
POTASSIUM SERPL-SCNC: 5 MMOL/L
PROT SERPL-MCNC: 7.2 G/DL
RBC # BLD: 4.21 M/UL
RBC # FLD: 13.3 %
SODIUM SERPL-SCNC: 140 MMOL/L
T3 SERPL-MCNC: 90 NG/DL
T4 SERPL-MCNC: 6.9 UG/DL
TRIGL SERPL-MCNC: 92 MG/DL
TSH SERPL-ACNC: 1.23 UIU/ML
WBC # FLD AUTO: 5.14 K/UL

## 2024-06-22 ENCOUNTER — APPOINTMENT (OUTPATIENT)
Dept: INTERNAL MEDICINE | Facility: CLINIC | Age: 34
End: 2024-06-22
Payer: COMMERCIAL

## 2024-06-22 VITALS
OXYGEN SATURATION: 98 % | WEIGHT: 143 LBS | SYSTOLIC BLOOD PRESSURE: 100 MMHG | BODY MASS INDEX: 24.41 KG/M2 | DIASTOLIC BLOOD PRESSURE: 64 MMHG | HEIGHT: 64 IN | HEART RATE: 66 BPM

## 2024-06-22 DIAGNOSIS — R05.9 COUGH, UNSPECIFIED: ICD-10-CM

## 2024-06-22 DIAGNOSIS — B35.1 TINEA UNGUIUM: ICD-10-CM

## 2024-06-22 PROCEDURE — 99213 OFFICE O/P EST LOW 20 MIN: CPT

## 2024-06-22 NOTE — PHYSICAL EXAM
[No Acute Distress] : no acute distress [Well Nourished] : well nourished [Well Developed] : well developed [No Respiratory Distress] : no respiratory distress  [No Accessory Muscle Use] : no accessory muscle use [Clear to Auscultation] : lungs were clear to auscultation bilaterally [Normal Rate] : normal rate  [Regular Rhythm] : with a regular rhythm [Normal S1, S2] : normal S1 and S2 [No Murmur] : no murmur heard [Alert and Oriented x3] : oriented to person, place, and time [Normal Mood] : the mood was normal [de-identified] : left toe fungus

## 2024-06-22 NOTE — HISTORY OF PRESENT ILLNESS
[FreeTextEntry1] : f/u with chronic post Covid cough [de-identified] : Pt reports still coughing at times after Covid infection last year. No fever,chills.No SOB.

## 2024-10-29 ENCOUNTER — APPOINTMENT (OUTPATIENT)
Dept: INTERNAL MEDICINE | Facility: CLINIC | Age: 34
End: 2024-10-29

## 2024-10-29 VITALS
BODY MASS INDEX: 25.27 KG/M2 | SYSTOLIC BLOOD PRESSURE: 117 MMHG | HEIGHT: 64 IN | HEART RATE: 69 BPM | DIASTOLIC BLOOD PRESSURE: 73 MMHG | OXYGEN SATURATION: 99 % | WEIGHT: 148 LBS

## 2024-10-29 DIAGNOSIS — M54.2 CERVICALGIA: ICD-10-CM

## 2024-10-29 DIAGNOSIS — R07.0 PAIN IN THROAT: ICD-10-CM

## 2024-10-29 PROCEDURE — 99385 PREV VISIT NEW AGE 18-39: CPT | Mod: 25

## 2024-10-29 PROCEDURE — G0008: CPT

## 2024-10-29 PROCEDURE — 90656 IIV3 VACC NO PRSV 0.5 ML IM: CPT

## 2024-10-29 RX ORDER — CYCLOBENZAPRINE HYDROCHLORIDE 5 MG/1
5 TABLET, FILM COATED ORAL TWICE DAILY
Qty: 14 | Refills: 0 | Status: ACTIVE | COMMUNITY
Start: 2024-10-29 | End: 1900-01-01

## 2024-10-30 ENCOUNTER — MED ADMIN CHARGE (OUTPATIENT)
Age: 34
End: 2024-10-30

## 2024-11-01 ENCOUNTER — OFFICE (OUTPATIENT)
Age: 34
Setting detail: OPHTHALMOLOGY
End: 2024-11-01
Payer: OTHER GOVERNMENT

## 2024-11-01 DIAGNOSIS — H01.001: ICD-10-CM

## 2024-11-01 DIAGNOSIS — H01.004: ICD-10-CM

## 2024-11-01 DIAGNOSIS — H40.013: ICD-10-CM

## 2024-11-01 PROCEDURE — 92020 GONIOSCOPY: CPT | Performed by: STUDENT IN AN ORGANIZED HEALTH CARE EDUCATION/TRAINING PROGRAM

## 2024-11-01 PROCEDURE — 92133 CPTRZD OPH DX IMG PST SGM ON: CPT | Performed by: STUDENT IN AN ORGANIZED HEALTH CARE EDUCATION/TRAINING PROGRAM

## 2024-11-01 PROCEDURE — 92004 COMPRE OPH EXAM NEW PT 1/>: CPT | Performed by: STUDENT IN AN ORGANIZED HEALTH CARE EDUCATION/TRAINING PROGRAM

## 2024-11-01 ASSESSMENT — VISUAL ACUITY
OD_BCVA: 20/20
OS_BCVA: 20/20

## 2024-11-01 ASSESSMENT — KERATOMETRY
OS_AXISANGLE_DEGREES: 089
OS_K2POWER_DIOPTERS: 41.00
OD_K2POWER_DIOPTERS: 41.00
METHOD_AUTO_MANUAL: AUTO
OD_AXISANGLE_DEGREES: 089
OD_K1POWER_DIOPTERS: 40.00
OS_K1POWER_DIOPTERS: 39.75

## 2024-11-01 ASSESSMENT — PACHYMETRY
OD_CT_UM: 559
OD_CT_CORRECTION: -1
OS_CT_UM: 568
OS_CT_CORRECTION: -1

## 2024-11-01 ASSESSMENT — REFRACTION_AUTOREFRACTION
OS_AXIS: 001
OS_CYLINDER: -1.00
OS_SPHERE: -0.25
OD_AXIS: 176
OD_CYLINDER: -0.50
OD_SPHERE: -0.50

## 2024-11-01 ASSESSMENT — LID EXAM ASSESSMENTS
OD_BLEPHARITIS: RUL T
OS_BLEPHARITIS: LUL T

## 2024-11-01 ASSESSMENT — TONOMETRY
OD_IOP_MMHG: 14
OS_IOP_MMHG: 15

## 2024-11-01 ASSESSMENT — CONFRONTATIONAL VISUAL FIELD TEST (CVF)
OD_FINDINGS: FULL
OS_FINDINGS: FULL

## 2024-11-11 ENCOUNTER — OUTPATIENT (OUTPATIENT)
Dept: OUTPATIENT SERVICES | Facility: HOSPITAL | Age: 34
LOS: 1 days | End: 2024-11-11
Payer: COMMERCIAL

## 2024-11-11 ENCOUNTER — APPOINTMENT (OUTPATIENT)
Dept: ULTRASOUND IMAGING | Facility: CLINIC | Age: 34
End: 2024-11-11

## 2024-11-11 DIAGNOSIS — R07.0 PAIN IN THROAT: ICD-10-CM

## 2024-11-11 DIAGNOSIS — O00.90 UNSPECIFIED ECTOPIC PREGNANCY WITHOUT INTRAUTERINE PREGNANCY: Chronic | ICD-10-CM

## 2024-11-11 PROCEDURE — 76536 US EXAM OF HEAD AND NECK: CPT

## 2024-11-11 PROCEDURE — 76536 US EXAM OF HEAD AND NECK: CPT | Mod: 26

## 2024-11-18 ENCOUNTER — OUTPATIENT (OUTPATIENT)
Dept: OUTPATIENT SERVICES | Facility: HOSPITAL | Age: 34
LOS: 1 days | End: 2024-11-18
Payer: COMMERCIAL

## 2024-11-18 VITALS
RESPIRATION RATE: 16 BRPM | OXYGEN SATURATION: 100 % | SYSTOLIC BLOOD PRESSURE: 110 MMHG | WEIGHT: 149.03 LBS | TEMPERATURE: 98 F | HEIGHT: 64 IN | DIASTOLIC BLOOD PRESSURE: 70 MMHG | HEART RATE: 70 BPM

## 2024-11-18 DIAGNOSIS — M20.41 OTHER HAMMER TOE(S) (ACQUIRED), RIGHT FOOT: ICD-10-CM

## 2024-11-18 DIAGNOSIS — M21.611 BUNION OF RIGHT FOOT: ICD-10-CM

## 2024-11-18 DIAGNOSIS — Z98.82 BREAST IMPLANT STATUS: Chronic | ICD-10-CM

## 2024-11-18 DIAGNOSIS — O00.90 UNSPECIFIED ECTOPIC PREGNANCY WITHOUT INTRAUTERINE PREGNANCY: Chronic | ICD-10-CM

## 2024-11-18 DIAGNOSIS — Z01.818 ENCOUNTER FOR OTHER PREPROCEDURAL EXAMINATION: ICD-10-CM

## 2024-11-18 DIAGNOSIS — M20.11 HALLUX VALGUS (ACQUIRED), RIGHT FOOT: ICD-10-CM

## 2024-11-18 DIAGNOSIS — Z98.890 OTHER SPECIFIED POSTPROCEDURAL STATES: Chronic | ICD-10-CM

## 2024-11-18 DIAGNOSIS — M21.621 BUNIONETTE OF RIGHT FOOT: ICD-10-CM

## 2024-11-18 PROBLEM — Z78.9 OTHER SPECIFIED HEALTH STATUS: Chronic | Status: INACTIVE | Noted: 2022-01-03 | Resolved: 2024-11-18

## 2024-11-18 PROCEDURE — G0463: CPT

## 2024-11-18 NOTE — H&P PST ADULT - HISTORY OF PRESENT ILLNESS
34 year  old female  history of bunion both foot , now with c/o right  foot pain for > 2 years causing waering shoes & difficulty walking , s/p podiic surgery consult. Now presents in PST prior to scheduled  Jalil bunionectomy with screw fixation , hammertoe correction right second,  third, fourth and fifth digits , Tailors bunion correction right fifth metatarsal on 12/06/2024.  NO significant past medical history

## 2024-11-18 NOTE — H&P PST ADULT - NSANTHOSAYNRD_GEN_A_CORE
No. LEONA screening performed.  STOP BANG Legend: 0-2 = LOW Risk; 3-4 = INTERMEDIATE Risk; 5-8 = HIGH Risk

## 2024-11-18 NOTE — H&P PST ADULT - NSICDXPROCEDURE_GEN_ALL_CORE_FT
PROCEDURES:  Bunionectomy, right 18-Nov-2024 07:22:40 Jalil bunionectomy with screw fixation , hammertoe correction right second,  third, fourth and fifth digits , Tailors bunion correction right fifth metatarsal on 12/06/2024.   Cooper Vasquez

## 2024-11-18 NOTE — H&P PST ADULT - SKIN/BREAST COMMENTS
h/o bilateral breast augmentation with silicone breast implants in 2022, having left foot fungal infection on medication

## 2024-11-18 NOTE — H&P PST ADULT - PATIENT'S GENDER IDENTITY
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 Since yesterday pt has mild intermittent chest pressure/SOB. Today also noticed b/l LE swelling. Tonight measured BP and was 135 which is high for her. Called her OB who referred to ED. Has unchanged mild vaginal bleeding. No other systemic symptoms.   Denies HA, NVD, URI symptoms, abd pain, urinary complaints, focal weakness/numbness, black/bloody stool.  Breast feeding.
Female

## 2024-11-18 NOTE — H&P PST ADULT - PROBLEM SELECTOR PLAN 1
Jalil bunionectomy with screw fixation , hammertoe correction right second,  third, fourth and fifth digits , Tailors bunion correction right fifth metatarsal on 12/06/2024.   Instructed to inform surgeon & seek medical attention if any changes in health  status like fever, chills, rash, infections, chest pain or any changes in health status . PST  instructions given in written/ explained about NPO, PREOP SKIN CARE  with antibacterial chlorhexidine wash x3 days preop([place]ans given) and   Arrival  2 hours prior to OR time in [facility] .Pre-op education provided - all questions answered. Pt verbalized understanding.

## 2024-11-18 NOTE — H&P PST ADULT - ASSESSMENT
Jalil bunionectomy with screw fixation , hammertoe correction right second,  third, fourth and fifth digits , Tailors bunion correction right fifth metatarsal on 12/06/2024.  :    Activity:   physically  active , gym 3-4x/week, cardio, elliptical   Energy Expenditure score (DASI SCORE METS): 9.3  Symptoms : denies chest pain, palpitations, dyspnea, dizziness or  SETHI,     Airway: normal  Dental: Patient denies Loose teeth/Denture.

## 2024-11-27 PROBLEM — B35.1 TINEA UNGUIUM: Chronic | Status: ACTIVE | Noted: 2024-11-18

## 2024-12-03 ENCOUNTER — APPOINTMENT (OUTPATIENT)
Dept: INTERNAL MEDICINE | Facility: CLINIC | Age: 34
End: 2024-12-03
Payer: MEDICAID

## 2024-12-03 ENCOUNTER — RESULT REVIEW (OUTPATIENT)
Age: 34
End: 2024-12-03

## 2024-12-03 VITALS
HEIGHT: 64 IN | DIASTOLIC BLOOD PRESSURE: 74 MMHG | SYSTOLIC BLOOD PRESSURE: 115 MMHG | WEIGHT: 148 LBS | BODY MASS INDEX: 25.27 KG/M2 | HEART RATE: 58 BPM | OXYGEN SATURATION: 100 %

## 2024-12-03 DIAGNOSIS — R42 DIZZINESS AND GIDDINESS: ICD-10-CM

## 2024-12-03 DIAGNOSIS — Z71.85 ENCOUNTER FOR IMMUNIZATION SAFETY COUNSELING: ICD-10-CM

## 2024-12-03 DIAGNOSIS — R10.2 PELVIC AND PERINEAL PAIN: ICD-10-CM

## 2024-12-03 DIAGNOSIS — Z76.89 PERSONS ENCOUNTERING HEALTH SERVICES IN OTHER SPECIFIED CIRCUMSTANCES: ICD-10-CM

## 2024-12-03 DIAGNOSIS — Z09 ENCOUNTER FOR FOLLOW-UP EXAMINATION AFTER COMPLETED TREATMENT FOR CONDITIONS OTHER THAN MALIGNANT NEOPLASM: ICD-10-CM

## 2024-12-03 LAB
HCG UR QL: NEGATIVE
QUALITY CONTROL: YES

## 2024-12-03 PROCEDURE — 99213 OFFICE O/P EST LOW 20 MIN: CPT

## 2024-12-03 PROCEDURE — 81025 URINE PREGNANCY TEST: CPT

## 2024-12-04 ENCOUNTER — APPOINTMENT (OUTPATIENT)
Dept: ULTRASOUND IMAGING | Facility: CLINIC | Age: 34
End: 2024-12-04
Payer: COMMERCIAL

## 2024-12-04 LAB
MEV IGG FLD QL IA: >300 AU/ML
MEV IGG+IGM SER-IMP: POSITIVE
MUV AB SER-ACNC: POSITIVE
MUV IGG SER QL IA: 68.8 AU/ML
RUBV IGG FLD-ACNC: 3.97 INDEX
RUBV IGG SER-IMP: POSITIVE
VZV AB TITR SER: POSITIVE
VZV IGG SER IF-ACNC: 14.7 S/CO

## 2024-12-04 PROCEDURE — 76830 TRANSVAGINAL US NON-OB: CPT | Mod: 26

## 2024-12-06 ENCOUNTER — TRANSCRIPTION ENCOUNTER (OUTPATIENT)
Age: 34
End: 2024-12-06

## 2024-12-06 ENCOUNTER — RESULT REVIEW (OUTPATIENT)
Age: 34
End: 2024-12-06

## 2024-12-06 RX ORDER — TERBINAFINE HYDROCHLORIDE 250 MG/1
1 TABLET ORAL
Refills: 0 | DISCHARGE

## 2025-03-24 NOTE — ED PROVIDER NOTE - CLINICAL SUMMARY MEDICAL DECISION MAKING FREE TEXT BOX
Last office visit 2/5/25.  Upcoming appt 8/5/25.  Last refill losartan and hctz 3/27/24.  Rx eprescribed per protocol.    
Jean Marie Kearns MD: 29 yo F no PMH p/w sternal CP x 2 week. Pt states that her CP has gotten worse the past 3 days after finding out that he father  from COVID in [place]. Pt state that at first her CP intermittent non-radiating, not associated with inspiration, position, exertion or food consumption but for the past 3 days her CP is worse with inspiration. PNA vs panic attack vs MSk due to grief of her father passing. will get ekg, CXR. pain control with sajan

## 2025-05-20 NOTE — ED ADULT NURSE NOTE - PRO INTERPRETER NEED 2
Neuro: A0x4 able to make needs known  Cv: Baseline BP's 100's systolic / 60's-70's diastolic  Pulm: No c/o SOB Sats mid 90's on RA LS clear and equal B/L  GI/: Up to void ad dania, no BM @ time of writing. Abd tender on palpation, BS+  Diet: Reg, poor intake. C/o nausea zofran given  Incisions/Drains: --  IV Access: L PIV SL  Labs: reviewed  Activity: independent, up ad dania  Pain: c/o abd cramping type pain, 5mg oxy given as scheduled, Q4 PRN 5mg oxy given once with good effect    New changes this shift: slept for periods of time this shift  Plan: discharge 6/29    
No
Polish